# Patient Record
Sex: FEMALE | Race: WHITE | NOT HISPANIC OR LATINO | Employment: FULL TIME | URBAN - METROPOLITAN AREA
[De-identification: names, ages, dates, MRNs, and addresses within clinical notes are randomized per-mention and may not be internally consistent; named-entity substitution may affect disease eponyms.]

---

## 2017-01-26 ENCOUNTER — PATIENT MESSAGE (OUTPATIENT)
Dept: FAMILY MEDICINE | Facility: CLINIC | Age: 67
End: 2017-01-26

## 2017-02-23 ENCOUNTER — OFFICE VISIT (OUTPATIENT)
Dept: FAMILY MEDICINE | Facility: CLINIC | Age: 67
End: 2017-02-23
Payer: MEDICARE

## 2017-02-23 VITALS
DIASTOLIC BLOOD PRESSURE: 70 MMHG | TEMPERATURE: 98 F | WEIGHT: 197.75 LBS | BODY MASS INDEX: 31.78 KG/M2 | HEART RATE: 68 BPM | HEIGHT: 66 IN | SYSTOLIC BLOOD PRESSURE: 104 MMHG

## 2017-02-23 DIAGNOSIS — Z13.9 SCREENING: ICD-10-CM

## 2017-02-23 DIAGNOSIS — R73.09 OTHER ABNORMAL GLUCOSE: ICD-10-CM

## 2017-02-23 DIAGNOSIS — R73.03 PREDIABETES: ICD-10-CM

## 2017-02-23 DIAGNOSIS — Z80.0 FAMILY HISTORY OF COLON CANCER REQUIRING SCREENING COLONOSCOPY: Primary | ICD-10-CM

## 2017-02-23 DIAGNOSIS — R79.9 ABNORMAL FINDING OF BLOOD CHEMISTRY: ICD-10-CM

## 2017-02-23 DIAGNOSIS — Z78.0 MENOPAUSE: ICD-10-CM

## 2017-02-23 DIAGNOSIS — Z84.89 FAMILY HEALTH PROBLEM: ICD-10-CM

## 2017-02-23 PROCEDURE — 99397 PER PM REEVAL EST PAT 65+ YR: CPT | Mod: S$GLB,,, | Performed by: FAMILY MEDICINE

## 2017-02-23 PROCEDURE — 99999 PR PBB SHADOW E&M-EST. PATIENT-LVL III: CPT | Mod: PBBFAC,,, | Performed by: FAMILY MEDICINE

## 2017-02-23 NOTE — MR AVS SNAPSHOT
"    [facility]  [address], [address]  [facility] [address]  Phone: [phone]  Fax: [phone]                  Sarita Lopez   2017 2:00 PM   Office Visit    Description:  Female : 1950   Provider:  Laila Singleton MD   Department:  [facility]           Reason for Visit     Other           Diagnoses this Visit        Comments    Family history of colon cancer requiring screening colonoscopy    -  Primary     Menopause         Screening         Family health problem         Other abnormal glucose         Prediabetes         Abnormal finding of blood chemistry                To Do List           Goals (5 Years of Data)     None      [facility]     [facility] -  Assistance  Registered nurses in the [facility] provide clinical advisement, health education, appointment booking, and other advisory services.  Call for this free service at [phone].             Medications           Message regarding Medications     Verify the changes and/or additions to your medication regime listed below are the same as discussed with your clinician today.  If any of these changes or additions are incorrect, please notify your healthcare provider.             Verify that the below list of medications is an accurate representation of the medications you are currently taking.  If none reported, the list may be blank. If incorrect, please contact your healthcare provider. Carry this list with you in case of emergency.                Clinical Reference Information           Your Vitals Were     BP Pulse Temp Height Weight BMI    104/70 (BP Location: Left arm) 68 98.3 °F (36.8 °C) 5' 6" (1.676 m) 89.7 kg (197 lb 12 oz) 31.92 kg/m2      Blood Pressure          Most Recent Value    BP  104/70      Allergies as of 2017     Benadryl [Diphenhydramine Hcl]    Tetracyclines      Immunizations Administered on Date of " Encounter - 2/23/2017     None      Orders Placed During Today's Visit      Normal Orders This Visit    Case request GI: COLONOSCOPY     Future Labs/Procedures Expected by Expires    CBC auto differential  2/23/2017 4/24/2018    Comprehensive metabolic panel  2/23/2017 4/24/2018    DXA Bone Density Spine And Hip  2/23/2017 2/23/2018    Hemoglobin A1c  2/23/2017 4/24/2018    Hepatitis C antibody  2/23/2017 4/24/2018    Lipid panel  2/23/2017 4/24/2018    TSH  2/23/2017 4/24/2018    US Abdominal Aorta  2/23/2017 2/23/2018      Language Assistance Services     ATTENTION: Language assistance services are available, free of charge. Please call [phone].      ATENCIÓN: Si habla bri, tiene a more disposición servicios gratuitos de asistencia lingüística. Llame al [phone].     CHÚ Ý: N?u b?n nói Ti?ng Vi?t, có các d?ch v? h? tr? ngôn ng? mi?n phí dành cho b?n. G?i s? [phone].         [facility] complies with applicable Federal civil rights laws and does not discriminate on the basis of race, color, national origin, age, disability, or sex.

## 2017-02-24 NOTE — PROGRESS NOTES
Sarita Lopez is a 67 y.o. female   Routine physical  Source of history: Patient  Past Medical History   Diagnosis Date    Endometriosis of pelvis     Osteopenia      Patient  reports that she has never smoked. She has never used smokeless tobacco. She reports that she does not drink alcohol or use illicit drugs.  Family History   Problem Relation Age of Onset    Cancer Father     Colon cancer Father 58    Diabetes Father     Stroke Mother     Dementia Mother     Breast cancer Neg Hx     Ovarian cancer Neg Hx      ROS:  GENERAL: No fever, chills, fatigability or weight loss.  SKIN: No rashes, itching or changes in color or texture of skin.  HEAD: No headaches or recent head trauma.  EYES: Visual acuity fine. No photophobia, ocular pain or diplopia.  EARS: Denies ear pain, discharge or vertigo.  NOSE: No loss of smell, no epistaxis or postnasal drip.  MOUTH & THROAT: No hoarseness or change in voice. No excessive gum bleeding.  NODES: Denies swollen glands.  CHEST: Denies DAWSON, cyanosis, wheezing, cough and sputum production.  CARDIOVASCULAR: Denies chest pain, PND, orthopnea or reduced exercise tolerance.  ABDOMEN: Appetite fine. No weight loss. Denies diarrhea, abdominal pain, hematemesis or blood in stool.  URINARY: No flank pain, dysuria or hematuria.  PERIPHERAL VASCULAR: No claudication or cyanosis.  MUSCULOSKELETAL: No joint stiffness or swelling. Denies back pain.  NEUROLOGIC: No history of seizures, paralysis, alteration of gait or coordination.    OBJECTIVE:  APPEARANCE: Overweight pleasant no acute distress  Vitals:    02/23/17 1334   BP: 104/70   Pulse: 68   Temp: 98.3 °F (36.8 °C)     SKIN: Normal skin turgor, no lesions.  HEENT: Both external auditory canals clear. Both tympanic membranes intact. PERRL. EOMI.   Disk margins sharp. No tonsillar enlargement. No pharyngeal erythema or exudate. No stridor.  NECK: No bruits. No cervical spine tenderness. No cervical lymphadenopathy. No  thyromegaly.  NODES: No cervical, axillary or inguinal lymph node enlargement.  CHEST: Breath sounds clear bilaterally. Lungs clear to auscultation & percussion. Good air movement. No rales. No retractions. No rhonchi. No stridor. No wheezes.  CARDIOVASCULAR: Normal S1, S2. No murmurs. No edema.  BREASTS: no masses palpated in either breast or axillary area, symmetry noted.  ABDOMEN: Bowel sounds normal. No palpable aortic enlargement. No CVA tenderness. No pulsatile mass. No rebound tenderness.  PERIPHERAL VASCULAR: Femoral pulses present and symmetrical. No edema.  MUSCULOSKELETAL: Degenerative changes of both ankles, foot, knee, wrist and hand.  BACK: No CVA tenderness. There is no spasm, tenderness or radiculopathy noted with palpation and there is full range of motion.   NEUROLOGIC:   Cranial Nerves: II-XII grossly intact.  Motor: 5/5 strength major flexors/extensors. No tremor.  DTR's: Knees, Ankles 2+ and equal bilaterally; downgoing toes.  Sensory: Intact to light touch distally.  Gait & Posture: Normal gait and fine motion. No cerebellar signs.  MENTAL STATUS: Alert. Oriented x 3. Language skills normal. Memory intact. No suicidal ideation. Well kept appearance.    ASSESSMENT/PLAN:   Sarita was seen today for other.    Diagnoses and all orders for this visit:    Family history of colon cancer requiring screening colonoscopy  -     Case request GI: COLONOSCOPY    Menopause  -     DXA Bone Density Spine And Hip; Future    Screening  -     US Abdominal Aorta; Future  -     Hemoglobin A1c; Future  -     Hepatitis C antibody; Future    Family health problem  -     CBC auto differential; Future  -     TSH; Future  -     Lipid panel; Future  -     Comprehensive metabolic panel; Future    Other abnormal glucose   -     CBC auto differential; Future  -     Hemoglobin A1c; Future    Prediabetes   -     TSH; Future    Abnormal finding of blood chemistry   -     Lipid panel; Future     GYN exam and mammogram  Patient  scheduled with outside gynecologist is already had her mammogram done  In May of last year at [facility] will have results sent to our office    Patient declines immunizations of any kind at this time  Will contact pt when results are available to discuss options

## 2017-03-17 ENCOUNTER — TELEPHONE (OUTPATIENT)
Dept: FAMILY MEDICINE | Facility: CLINIC | Age: 67
End: 2017-03-17

## 2017-03-17 NOTE — TELEPHONE ENCOUNTER
----- Message from Amelia Fu sent at 3/17/2017 12:36 PM CDT -----  Pt wanted to know if she should have an EKG done. She didn't know if that was part of her routine check up.    Thanks!

## 2017-03-17 NOTE — TELEPHONE ENCOUNTER
Spoke with patient would like to know if an EKG is included with her routine preventative benefits for medicare.  Advised patient that she would need to contact medicare for a list of covered preventatives unless she's experiencing cardiac symptoms ( which she states she's not).  Patient states she will contact medicare concerning the list of covered preventatives.

## 2017-03-22 ENCOUNTER — LAB VISIT (OUTPATIENT)
Dept: LAB | Facility: HOSPITAL | Age: 67
End: 2017-03-22
Attending: FAMILY MEDICINE
Payer: MEDICARE

## 2017-03-22 DIAGNOSIS — R79.9 ABNORMAL FINDING OF BLOOD CHEMISTRY: ICD-10-CM

## 2017-03-22 DIAGNOSIS — R73.03 PREDIABETES: ICD-10-CM

## 2017-03-22 DIAGNOSIS — Z84.89 FAMILY HEALTH PROBLEM: ICD-10-CM

## 2017-03-22 DIAGNOSIS — R73.09 OTHER ABNORMAL GLUCOSE: ICD-10-CM

## 2017-03-22 DIAGNOSIS — Z13.9 SCREENING: ICD-10-CM

## 2017-03-22 LAB
ALBUMIN SERPL BCP-MCNC: 3.6 G/DL
ALP SERPL-CCNC: 84 U/L
ALT SERPL W/O P-5'-P-CCNC: 25 U/L
ANION GAP SERPL CALC-SCNC: 11 MMOL/L
AST SERPL-CCNC: 25 U/L
BASOPHILS # BLD AUTO: 0.03 K/UL
BASOPHILS NFR BLD: 0.6 %
BILIRUB SERPL-MCNC: 0.5 MG/DL
BUN SERPL-MCNC: 16 MG/DL
CALCIUM SERPL-MCNC: 9.4 MG/DL
CHLORIDE SERPL-SCNC: 107 MMOL/L
CHOLEST/HDLC SERPL: 1.9 {RATIO}
CO2 SERPL-SCNC: 24 MMOL/L
CREAT SERPL-MCNC: 0.9 MG/DL
DIFFERENTIAL METHOD: NORMAL
EOSINOPHIL # BLD AUTO: 0.2 K/UL
EOSINOPHIL NFR BLD: 3.9 %
ERYTHROCYTE [DISTWIDTH] IN BLOOD BY AUTOMATED COUNT: 13.3 %
EST. GFR  (AFRICAN AMERICAN): >60 ML/MIN/1.73 M^2
EST. GFR  (NON AFRICAN AMERICAN): >60 ML/MIN/1.73 M^2
GLUCOSE SERPL-MCNC: 90 MG/DL
HCT VFR BLD AUTO: 46 %
HCV AB SERPL QL IA: NEGATIVE
HDL/CHOLESTEROL RATIO: 54.1 %
HDLC SERPL-MCNC: 111 MG/DL
HDLC SERPL-MCNC: 60 MG/DL
HGB BLD-MCNC: 14.8 G/DL
LDLC SERPL CALC-MCNC: 47 MG/DL
LYMPHOCYTES # BLD AUTO: 2 K/UL
LYMPHOCYTES NFR BLD: 41.8 %
MCH RBC QN AUTO: 30.6 PG
MCHC RBC AUTO-ENTMCNC: 32.2 %
MCV RBC AUTO: 95 FL
MONOCYTES # BLD AUTO: 0.5 K/UL
MONOCYTES NFR BLD: 10.3 %
NEUTROPHILS # BLD AUTO: 2.1 K/UL
NEUTROPHILS NFR BLD: 43.4 %
NONHDLC SERPL-MCNC: 51 MG/DL
PLATELET # BLD AUTO: 228 K/UL
PMV BLD AUTO: 12.3 FL
POTASSIUM SERPL-SCNC: 4.1 MMOL/L
PROT SERPL-MCNC: 7 G/DL
RBC # BLD AUTO: 4.83 M/UL
SODIUM SERPL-SCNC: 142 MMOL/L
TRIGL SERPL-MCNC: 20 MG/DL
TSH SERPL DL<=0.005 MIU/L-ACNC: 1.03 UIU/ML
WBC # BLD AUTO: 4.86 K/UL

## 2017-03-22 PROCEDURE — 84443 ASSAY THYROID STIM HORMONE: CPT

## 2017-03-22 PROCEDURE — 36415 COLL VENOUS BLD VENIPUNCTURE: CPT | Mod: PO

## 2017-03-22 PROCEDURE — 80061 LIPID PANEL: CPT

## 2017-03-22 PROCEDURE — 83036 HEMOGLOBIN GLYCOSYLATED A1C: CPT

## 2017-03-22 PROCEDURE — 85025 COMPLETE CBC W/AUTO DIFF WBC: CPT

## 2017-03-22 PROCEDURE — 86803 HEPATITIS C AB TEST: CPT

## 2017-03-22 PROCEDURE — 80053 COMPREHEN METABOLIC PANEL: CPT

## 2017-03-23 ENCOUNTER — HOSPITAL ENCOUNTER (OUTPATIENT)
Dept: RADIOLOGY | Facility: OTHER | Age: 67
Discharge: HOME | End: 2017-03-23
Attending: FAMILY MEDICINE
Payer: MEDICARE

## 2017-03-23 ENCOUNTER — PATIENT MESSAGE (OUTPATIENT)
Dept: FAMILY MEDICINE | Facility: CLINIC | Age: 67
End: 2017-03-23

## 2017-03-23 DIAGNOSIS — Z13.9 SCREENING: ICD-10-CM

## 2017-03-23 DIAGNOSIS — Z78.0 MENOPAUSE: ICD-10-CM

## 2017-03-23 LAB
ESTIMATED AVG GLUCOSE: 120 MG/DL
HBA1C MFR BLD HPLC: 5.8 %

## 2017-03-23 PROCEDURE — 77080 DXA BONE DENSITY AXIAL: CPT | Mod: TC

## 2017-03-23 PROCEDURE — 77080 DXA BONE DENSITY AXIAL: CPT | Mod: 26,,, | Performed by: RADIOLOGY

## 2017-03-23 PROCEDURE — 76775 US EXAM ABDO BACK WALL LIM: CPT | Mod: 26,,, | Performed by: RADIOLOGY

## 2017-03-23 PROCEDURE — 76775 US EXAM ABDO BACK WALL LIM: CPT | Mod: TC

## 2017-03-27 ENCOUNTER — PATIENT MESSAGE (OUTPATIENT)
Dept: FAMILY MEDICINE | Facility: CLINIC | Age: 67
End: 2017-03-27

## 2017-03-31 ENCOUNTER — TELEPHONE (OUTPATIENT)
Dept: FAMILY MEDICINE | Facility: CLINIC | Age: 67
End: 2017-03-31

## 2017-04-03 ENCOUNTER — CLINICAL SUPPORT (OUTPATIENT)
Dept: FAMILY MEDICINE | Facility: CLINIC | Age: 67
End: 2017-04-03
Payer: MEDICARE

## 2017-04-03 VITALS — DIASTOLIC BLOOD PRESSURE: 76 MMHG | SYSTOLIC BLOOD PRESSURE: 138 MMHG

## 2017-04-03 PROCEDURE — 99999 PR PBB SHADOW E&M-EST. PATIENT-LVL I: CPT | Mod: PBBFAC,,,

## 2017-04-03 NOTE — PROGRESS NOTES
Patient here today for b/p check b/p 138/76,MD notified of B/p reading.   Patient also had a few concerns states a few days ago her left ring finger had some redness with slight discomfort, and her left inner thigh was pulsating.  Patient states the symptoms haven't returned but wanted to know if she should see vascular med.  Please advise.

## 2017-04-04 ENCOUNTER — OFFICE VISIT (OUTPATIENT)
Dept: OPTOMETRY | Facility: CLINIC | Age: 67
End: 2017-04-04
Payer: MEDICARE

## 2017-04-04 DIAGNOSIS — H52.4 ASTIGMATISM WITH PRESBYOPIA, BILATERAL: ICD-10-CM

## 2017-04-04 DIAGNOSIS — H52.203 ASTIGMATISM WITH PRESBYOPIA, BILATERAL: ICD-10-CM

## 2017-04-04 DIAGNOSIS — H25.13 NUCLEAR SCLEROSIS, BILATERAL: Primary | ICD-10-CM

## 2017-04-04 PROCEDURE — 92004 COMPRE OPH EXAM NEW PT 1/>: CPT | Mod: S$GLB,,, | Performed by: OPTOMETRIST

## 2017-04-04 PROCEDURE — 99999 PR PBB SHADOW E&M-EST. PATIENT-LVL II: CPT | Mod: PBBFAC,,, | Performed by: OPTOMETRIST

## 2017-04-04 NOTE — MR AVS SNAPSHOT
[place] [facility]   [address]  [place] [address]  Phone: [phone]  Fax: [phone]                  Charu Lopez   2017 3:00 PM   Office Visit    Description:  Female : 1950   Provider:  Eddy Lucero OD   Department:  [place] [facility]           Reason for Visit     Concerns About Ocular Health           Diagnoses this Visit        Comments    Nuclear sclerosis, bilateral    -  Primary     Astigmatism with presbyopia, bilateral                To Do List           Goals (5 Years of Data)     None      Follow-Up and Disposition     Return in about 1 year (around 2018) for routine eye exam.    Follow-up and Disposition History      [facility]     [facility] -  Assistance  Unless otherwise directed by your provider, please contact [facility], our nurse care line that is available for  assistance.     Registered nurses in the [facility] provide: appointment scheduling, clinical advisement, health education, and other advisory services.  Call: [phone] (toll free)               Medications           Message regarding Medications     Verify the changes and/or additions to your medication regime listed below are the same as discussed with your clinician today.  If any of these changes or additions are incorrect, please notify your healthcare provider.             Verify that the below list of medications is an accurate representation of the medications you are currently taking.  If none reported, the list may be blank. If incorrect, please contact your healthcare provider. Carry this list with you in case of emergency.                Clinical Reference Information           Allergies as of 2017     Benadryl [Diphenhydramine Hcl]    Tetracyclines      Immunizations Administered on Date of Encounter - 2017     None      Instructions    Diagnosis and Plan reviewed with patient.        Language Assistance Services     ATTENTION: Language assistance services are available, free of charge. Please call [phone].      ATENCIÓN: Si habla bri, tiene a more disposición servicios gratuitos de asistencia lingüística. Llame al [phone].     CHÚ Ý: N?u b?n nói Ti?ng Vi?t, có các d?ch v? h? tr? ngôn ng? mi?n phí dành cho b?n. G?i s? [phone].         [facility] complies with applicable Federal civil rights laws and does not discriminate on the basis of race, color, national origin, age, disability, or sex.

## 2017-04-04 NOTE — PROGRESS NOTES
HPI     Last exam 12/2015. Hasn't noticed any vision change.Recently had blood   work done was told she was pre-diabetic A1C was 5.8.  Wears separate pairs   of glasses for distance and near. Drugstore reading RX is +1.50.       Last edited by Jill Daly on 4/4/2017  3:34 PM.     ROS     Negative for: Constitutional, Gastrointestinal, Neurological, Skin,   Genitourinary, Musculoskeletal, HENT, Endocrine, Cardiovascular, Eyes,   Respiratory, Psychiatric, Allergic/Imm, Heme/Lymph    Last edited by Eddy Lucero, OD on 4/4/2017  4:11 PM. (History)        Assessment /Plan     For exam results, see Encounter Report.    Nuclear sclerosis, bilateral    Astigmatism with presbyopia, bilateral      1. Educated pt on presence of cataracts and effects on vision. No surgery at this time. Recheck in one year.  2. Spec Rx given. Different lens options discussed with patient. RTC 1 year full exam.

## 2017-04-19 ENCOUNTER — OFFICE VISIT (OUTPATIENT)
Dept: FAMILY MEDICINE | Facility: CLINIC | Age: 67
End: 2017-04-19
Payer: MEDICARE

## 2017-04-19 VITALS
TEMPERATURE: 98 F | SYSTOLIC BLOOD PRESSURE: 120 MMHG | HEART RATE: 68 BPM | WEIGHT: 186.94 LBS | HEIGHT: 66 IN | BODY MASS INDEX: 30.04 KG/M2 | DIASTOLIC BLOOD PRESSURE: 80 MMHG

## 2017-04-19 DIAGNOSIS — R09.89 ABNORMAL FEMORAL PULSE: ICD-10-CM

## 2017-04-19 DIAGNOSIS — R07.9 CHEST PAIN, UNSPECIFIED TYPE: ICD-10-CM

## 2017-04-19 DIAGNOSIS — M62.838 MUSCLE SPASM: ICD-10-CM

## 2017-04-19 DIAGNOSIS — I72.4 FEMORAL ARTERY ANEURYSM: Primary | ICD-10-CM

## 2017-04-19 PROCEDURE — 99214 OFFICE O/P EST MOD 30 MIN: CPT | Mod: S$GLB,,, | Performed by: FAMILY MEDICINE

## 2017-04-19 PROCEDURE — 93005 ELECTROCARDIOGRAM TRACING: CPT | Mod: S$GLB,,, | Performed by: FAMILY MEDICINE

## 2017-04-19 PROCEDURE — 99999 PR PBB SHADOW E&M-EST. PATIENT-LVL III: CPT | Mod: PBBFAC,,, | Performed by: FAMILY MEDICINE

## 2017-04-19 PROCEDURE — 1160F RVW MEDS BY RX/DR IN RCRD: CPT | Mod: S$GLB,,, | Performed by: FAMILY MEDICINE

## 2017-04-19 PROCEDURE — 93010 ELECTROCARDIOGRAM REPORT: CPT | Mod: S$GLB,,, | Performed by: INTERNAL MEDICINE

## 2017-04-19 RX ORDER — MULTIVITAMIN
1 TABLET ORAL DAILY
COMMUNITY

## 2017-04-20 ENCOUNTER — LAB VISIT (OUTPATIENT)
Dept: LAB | Facility: HOSPITAL | Age: 67
End: 2017-04-20
Attending: FAMILY MEDICINE
Payer: MEDICARE

## 2017-04-20 DIAGNOSIS — M62.838 MUSCLE SPASM: ICD-10-CM

## 2017-04-20 LAB
MAGNESIUM SERPL-MCNC: 2.3 MG/DL
PHOSPHATE SERPL-MCNC: 2.7 MG/DL

## 2017-04-20 PROCEDURE — 82085 ASSAY OF ALDOLASE: CPT

## 2017-04-20 PROCEDURE — 36415 COLL VENOUS BLD VENIPUNCTURE: CPT | Mod: PO

## 2017-04-20 PROCEDURE — 83735 ASSAY OF MAGNESIUM: CPT

## 2017-04-20 PROCEDURE — 82550 ASSAY OF CK (CPK): CPT

## 2017-04-20 PROCEDURE — 84100 ASSAY OF PHOSPHORUS: CPT

## 2017-04-20 NOTE — PROGRESS NOTES
Subjective:       Patient ID: Charu Lopez is a 67 y.o. female.    Chief Complaint: Otalgia  Chest Pain (LEFT SIDE); she'll experience some pulsating of the left medial thigh area that was visible.  ; ELEVATED BLOOD PRESSURE patient is also had some scattered muscle twitches throughout that lasts for less than a minute    HPI see above  Review of Systems   Constitutional: Negative.    HENT: Negative.    Eyes: Negative.    Cardiovascular: Positive for chest pain.   Gastrointestinal: Negative.    Endocrine: Negative.    Genitourinary: Negative.    Musculoskeletal:        Scattered muscle twitches   Skin: Negative.    Allergic/Immunologic: Negative.    Neurological: Positive for numbness.   Hematological: Negative.    Psychiatric/Behavioral: Negative.        Objective:      Physical Exam   Constitutional: She is oriented to person, place, and time. She appears well-developed and well-nourished. No distress.   HENT:   Head: Normocephalic and atraumatic.   Right Ear: External ear normal.   Left Ear: External ear normal.   Nose: Nose normal.   Mouth/Throat: Oropharynx is clear and moist.   Eyes: Conjunctivae and EOM are normal. Pupils are equal, round, and reactive to light. Right eye exhibits no discharge. Left eye exhibits no discharge.   Neck: Normal range of motion. Neck supple. No JVD present. No thyromegaly present.   Cardiovascular: Normal rate, regular rhythm, normal heart sounds and intact distal pulses.    Pulmonary/Chest: Effort normal and breath sounds normal. No respiratory distress.   Abdominal: Soft. Bowel sounds are normal. She exhibits no distension. There is no tenderness.   Musculoskeletal: Normal range of motion.   Neurological: She is alert and oriented to person, place, and time. She displays normal reflexes. No cranial nerve deficit. Coordination normal.   Skin: She is not diaphoretic.        Colored area represents area where there was  pulsation reported that was actually visible.    Nursing note and vitals reviewed.      Assessment:       1. Femoral artery aneurysm    2. Chest pain, unspecified type    3. Muscle spasm        Plan:           IN OFFICE EKG 12-LEAD (to Muse) normal sinus rhythym     Future Labs/Procedures Expected by Expires     Aldolase  4/19/2017 6/18/2018     CK isoenzymes  4/19/2017 6/18/2018     Magnesium  4/19/2017 6/18/2018     PHOSPHORUS  4/19/2017 6/18/2018     Cardiology Lab US Lower Extremity Arteries Bilateral  As directed 4/19/2018     Exercise stress echo  As directed 4/19/2018     Intact the patient results of testing are available.

## 2017-04-21 LAB — ALDOLASE SERPL-CCNC: 3.7 U/L

## 2017-04-25 LAB
CK MB CFR SERPL ELPH: 0 % (ref 0–3.3)
CK SERPL-CCNC: 46 U/L (ref 30–223)
CK-BB: 0 %
CK-MM: 100 % (ref 96.7–100)

## 2017-04-27 ENCOUNTER — CLINICAL SUPPORT (OUTPATIENT)
Dept: CARDIOLOGY | Facility: CLINIC | Age: 67
End: 2017-04-27
Payer: MEDICARE

## 2017-04-27 DIAGNOSIS — I72.4 FEMORAL ARTERY ANEURYSM: ICD-10-CM

## 2017-04-27 DIAGNOSIS — R07.9 CHEST PAIN, UNSPECIFIED TYPE: ICD-10-CM

## 2017-04-27 LAB
DIASTOLIC DYSFUNCTION: NO
MITRAL VALVE MOBILITY: NORMAL
RETIRED EF AND QEF - SEE NOTES: 60 (ref 55–65)

## 2017-04-27 PROCEDURE — 93351 STRESS TTE COMPLETE: CPT | Mod: S$GLB,,, | Performed by: INTERNAL MEDICINE

## 2017-04-27 PROCEDURE — 93321 DOPPLER ECHO F-UP/LMTD STD: CPT | Mod: S$GLB,,, | Performed by: INTERNAL MEDICINE

## 2017-04-27 PROCEDURE — 93925 LOWER EXTREMITY STUDY: CPT | Mod: S$GLB,,, | Performed by: INTERNAL MEDICINE

## 2017-08-01 ENCOUNTER — TELEPHONE (OUTPATIENT)
Dept: FAMILY MEDICINE | Facility: CLINIC | Age: 67
End: 2017-08-01

## 2017-08-01 DIAGNOSIS — Z12.31 OTHER SCREENING MAMMOGRAM: Primary | ICD-10-CM

## 2017-08-01 NOTE — TELEPHONE ENCOUNTER
----- Message from Indira Agatha sent at 8/1/2017  4:28 PM CDT -----  Contact: pt [phone]  Pt said she need the Dr to fax over her orders for her mammogram to [phone] her appointment is on 9-6-2017,please advise

## 2017-08-02 NOTE — TELEPHONE ENCOUNTER
patient advised per voicemail the requested order was fax to [facility] to the number provided below.

## 2017-09-01 ENCOUNTER — TELEPHONE (OUTPATIENT)
Dept: FAMILY MEDICINE | Facility: CLINIC | Age: 67
End: 2017-09-01

## 2017-09-01 NOTE — TELEPHONE ENCOUNTER
----- Message from Fay Collins sent at 9/1/2017  8:33 AM CDT -----  Contact: Patient [phone]  Orders for her mammogram need to be faxed to [facility] at [phone]. Earlier request had the wrong fax number. Please resend order - her mammo is scheduled for 09/06/2017.    Please advise.    Thank You

## 2018-01-05 ENCOUNTER — HOSPITAL ENCOUNTER (OUTPATIENT)
Dept: RADIOLOGY | Facility: HOSPITAL | Age: 68
Discharge: HOME | End: 2018-01-05
Attending: NURSE PRACTITIONER
Payer: MEDICARE

## 2018-01-05 ENCOUNTER — OFFICE VISIT (OUTPATIENT)
Dept: FAMILY MEDICINE | Facility: CLINIC | Age: 68
End: 2018-01-05
Payer: MEDICARE

## 2018-01-05 VITALS
HEIGHT: 66 IN | WEIGHT: 191.13 LBS | HEART RATE: 80 BPM | TEMPERATURE: 98 F | SYSTOLIC BLOOD PRESSURE: 117 MMHG | DIASTOLIC BLOOD PRESSURE: 71 MMHG | BODY MASS INDEX: 30.72 KG/M2
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
## 2018-01-05 DIAGNOSIS — T14.8XXA ABRASION: ICD-10-CM

## 2018-01-05 DIAGNOSIS — M25.512 ACUTE PAIN OF LEFT SHOULDER: ICD-10-CM

## 2018-01-05 DIAGNOSIS — S80.02XA CONTUSION OF LEFT KNEE, INITIAL ENCOUNTER: ICD-10-CM

## 2018-01-05 DIAGNOSIS — M79.642 LEFT HAND PAIN: ICD-10-CM
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
## 2018-01-05 PROCEDURE — 73130 X-RAY EXAM OF HAND: CPT | Mod: TC,FY,PO,LT

## 2018-01-05 PROCEDURE — 73110 X-RAY EXAM OF WRIST: CPT | Mod: 26,LT,, | Performed by: RADIOLOGY

## 2018-01-05 PROCEDURE — 99999 PR PBB SHADOW E&M-EST. PATIENT-LVL V: CPT | Mod: PBBFAC,,, | Performed by: NURSE PRACTITIONER

## 2018-01-05 PROCEDURE — 73030 X-RAY EXAM OF SHOULDER: CPT | Mod: 26,LT,, | Performed by: RADIOLOGY

## 2018-01-05 PROCEDURE — 73562 X-RAY EXAM OF KNEE 3: CPT | Mod: TC,FY,PO,LT

## 2018-01-05 PROCEDURE — 99214 OFFICE O/P EST MOD 30 MIN: CPT | Mod: 25,S$GLB,, | Performed by: NURSE PRACTITIONER

## 2018-01-05 PROCEDURE — 73110 X-RAY EXAM OF WRIST: CPT | Mod: TC,FY,PO,LT

## 2018-01-05 PROCEDURE — 73562 X-RAY EXAM OF KNEE 3: CPT | Mod: 26,LT,, | Performed by: RADIOLOGY

## 2018-01-05 PROCEDURE — 90471 IMMUNIZATION ADMIN: CPT | Mod: S$GLB,,, | Performed by: FAMILY MEDICINE

## 2018-01-05 PROCEDURE — 90714 TD VACC NO PRESV 7 YRS+ IM: CPT | Mod: S$GLB,,, | Performed by: FAMILY MEDICINE

## 2018-01-05 PROCEDURE — 73030 X-RAY EXAM OF SHOULDER: CPT | Mod: TC,FY,PO,LT

## 2018-01-05 PROCEDURE — 73130 X-RAY EXAM OF HAND: CPT | Mod: 26,LT,, | Performed by: RADIOLOGY

## 2018-01-05 NOTE — PROGRESS NOTES
"Subjective:       Patient ID: Charu Lopez is a 67 y.o. female.    Chief Complaint: Laceration (above eye/fall 1/4); Knee Injury (fall 1/4 bruise); and Hand Injury (left/fall 1/4)    Pt here for multiple contusions.  Tripped over  in parking lot yesterday.  Fell and hit knees.  Had sunglasses on and they hit left side of face making a small abrasion to left eyebrow area.  Has pain and bruising to left hand, left knee and left shoulder pain.  Some neck pain        Laceration      Knee Injury   Associated symptoms include arthralgias. Pertinent negatives include no abdominal pain, headaches, nausea or vomiting.   Hand Injury        Past Medical History:   Diagnosis Date    Endometrioma of ovary     Osteopenia      Past Surgical History:   Procedure Laterality Date    BREAST BIOPSY  91    wnl     ENDOMETRIAL BIOPSY  12/12/2012    laparatomy 1994      OOPHORECTOMY  1995    Left ov removal due to endometrioma     Social History     Social History Narrative    No narrative on file     Family History   Problem Relation Age of Onset    Cancer Father     Colon cancer Father 58    Diabetes Father     Hypertension Father     Stroke Mother     Dementia Mother     Breast cancer Neg Hx     Ovarian cancer Neg Hx     Amblyopia Neg Hx     Strabismus Neg Hx     Thyroid disease Neg Hx     Retinal detachment Neg Hx     Macular degeneration Neg Hx     Glaucoma Neg Hx     Cataracts Neg Hx     Blindness Neg Hx      Vitals:    01/05/18 1327   BP: 117/71   Pulse: 80   Temp: 98.2 °F (36.8 °C)   TempSrc: Oral   Weight: 86.7 kg (191 lb 2.2 oz)   Height: 5' 6" (1.676 m)   PainSc:   2   PainLoc: Knee     Outpatient Encounter Prescriptions as of 1/5/2018   Medication Sig Dispense Refill    multivitamin (ONE DAILY MULTIVITAMIN) per tablet Take 1 tablet by mouth once daily.       No facility-administered encounter medications on file as of 1/5/2018.      Wt Readings from Last 3 Encounters:   01/05/18 " "86.7 kg (191 lb 2.2 oz)   04/19/17 84.8 kg (186 lb 15.2 oz)   02/23/17 89.7 kg (197 lb 12 oz)     Last 3 sets of Vitals    Vitals - 1 value per visit 4/4/2017 4/19/2017 1/5/2018   SYSTOLIC - 120 117   DIASTOLIC - 80 71   PULSE - 68 80   TEMPERATURE - 98 98.2   RESPIRATIONS - - -   SPO2 - - -   Weight (lb) - 186.95 191.14   Weight (kg) - 84.8 86.7   HEIGHT - 5' 6" 5' 6"   BODY MASS INDEX - 30.17 30.85   VISIT REPORT - - -   Pain Score  0 4 2   Some recent data might be hidden     No results found for: CBC  Review of Systems   HENT: Negative for tinnitus and trouble swallowing.    Gastrointestinal: Negative for abdominal pain, constipation, diarrhea, nausea and vomiting.   Musculoskeletal: Positive for arthralgias.   Skin: Positive for wound.   Neurological: Negative for dizziness, facial asymmetry and headaches.       Objective:      Physical Exam   Constitutional: She is oriented to person, place, and time. She appears well-developed and well-nourished. No distress.   HENT:   Head: Normocephalic and atraumatic.   Eyes: Conjunctivae and EOM are normal. Pupils are equal, round, and reactive to light.   Neck: Normal range of motion and full passive range of motion without pain. Neck supple. Spinous process tenderness present. No muscular tenderness present. No neck rigidity. No edema and no erythema present.       TTP over lower c spine midline     Cardiovascular: Normal rate, regular rhythm, normal heart sounds and intact distal pulses.    Pulmonary/Chest: Effort normal and breath sounds normal. No respiratory distress.   Abdominal: Soft.   Musculoskeletal:        Left shoulder: She exhibits tenderness and bony tenderness. She exhibits normal range of motion, no swelling, no effusion, no crepitus, no deformity, no laceration, no pain, no spasm, normal pulse and normal strength.        Left wrist: Normal.        Left knee: She exhibits swelling, effusion, ecchymosis and bony tenderness. She exhibits normal range of " motion, no deformity, no laceration, no erythema, normal alignment, no LCL laxity, normal patellar mobility, normal meniscus and no MCL laxity. Tenderness found. Medial joint line tenderness noted. No lateral joint line, no MCL, no LCL and no patellar tendon tenderness noted.        Left ankle: Normal.        Arms:       Left hand: She exhibits tenderness, bony tenderness and swelling. She exhibits normal range of motion, normal two-point discrimination, normal capillary refill, no deformity and no laceration. Normal sensation noted. Decreased strength noted.        Hands:       Legs:  Neurological: She is alert and oriented to person, place, and time. No cranial nerve deficit.   Skin: Skin is warm and dry. Capillary refill takes less than 2 seconds. She is not diaphoretic.   Small abrasion to area over left eye, no active bleeding  Some surrounding ecchymosis noted     Psychiatric: She has a normal mood and affect. Her behavior is normal. Judgment and thought content normal.   Nursing note and vitals reviewed.      Assessment:       1. Fall, initial encounter    2. Abrasion    3. Acute pain of left shoulder    4. Left hand pain    5. Contusion of left knee, initial encounter        Plan:       Pt declined cervical spine films  Neuro exam negative  Charu was seen today for laceration, knee injury and hand injury.    Diagnoses and all orders for this visit:    Fall, initial encounter  -     (In Office Administered) Td Vaccine - Preservative Free  -     X-Ray Wrist Complete Left; Future  -     X-Ray Hand 3 view Left; Future  -     X-Ray Shoulder Trauma 3 view Left; Future  -     X-Ray Knee 3 View Left; Future    Abrasion  -     (In Office Administered) Td Vaccine - Preservative Free    Acute pain of left shoulder  -     X-Ray Shoulder Trauma 3 view Left; Future    Left hand pain  -     X-Ray Wrist Complete Left; Future  -     X-Ray Hand 3 view Left; Future    Contusion of left knee, initial encounter  -     X-Ray  Knee 3 View Left; Future      Patient Instructions   Ice to sore areas 20 minutes at a time 4 times a day    I will send you a message with your xray results    Tetanus today    Call for any worsening

## 2018-01-05 NOTE — PROGRESS NOTES
Td vaccine given as ordered. Two patient identifiers used, allergies reviewed, & vaccine verified. Administered to left deltoid. Pt tolerated injection well; no swelling, redness, or bruising noted at injection site. Pt advised to remain in clinic 15 mins following injection for observation.

## 2018-01-05 NOTE — PATIENT INSTRUCTIONS
Ice to sore areas 20 minutes at a time 4 times a day    I will send you a message with your xray results    Tetanus today    Call for any worsening

## 2018-01-12 ENCOUNTER — OFFICE VISIT (OUTPATIENT)
Dept: FAMILY MEDICINE | Facility: CLINIC | Age: 68
End: 2018-01-12
Payer: MEDICARE

## 2018-01-12 VITALS
DIASTOLIC BLOOD PRESSURE: 82 MMHG | BODY MASS INDEX: 30.76 KG/M2 | HEART RATE: 96 BPM | WEIGHT: 191.38 LBS | HEIGHT: 66 IN | TEMPERATURE: 98 F | SYSTOLIC BLOOD PRESSURE: 126 MMHG

## 2018-01-12 DIAGNOSIS — L03.90 WOUND CELLULITIS: Primary | ICD-10-CM

## 2018-01-12 PROCEDURE — 99999 PR PBB SHADOW E&M-EST. PATIENT-LVL III: CPT | Mod: PBBFAC,,, | Performed by: FAMILY MEDICINE

## 2018-01-12 PROCEDURE — 99213 OFFICE O/P EST LOW 20 MIN: CPT | Mod: S$GLB,,, | Performed by: FAMILY MEDICINE

## 2018-01-12 RX ORDER — MUPIROCIN CALCIUM 20 MG/G
CREAM TOPICAL 2 TIMES DAILY
Qty: 15 G | Refills: 0 | Status: SHIPPED | OUTPATIENT
Start: 2018-01-12 | End: 2018-08-06

## 2018-01-12 NOTE — PROGRESS NOTES
Subjective:       Patient ID: Charu Lopez is a 68 y.o. female.    Chief Complaint: Abrasion (to knuckles )   patient in for follow-up today was initially seen a week ago after she experienced a fall   and placed this patient feels like the wound is not healing as quickly as it should be.  She feels more stiffness over the knuckles where the abrasions are however there is no increased redness or discharge to the knuckles  HPI the above  Review of Systems   Constitutional: Negative.    HENT: Negative.    Eyes: Negative.    Respiratory: Negative.    Cardiovascular: Negative.    Gastrointestinal: Negative.    Endocrine: Negative.    Genitourinary: Negative.    Musculoskeletal: Positive for arthralgias.   Skin: Positive for wound.   Allergic/Immunologic: Negative.    Neurological: Negative.    Hematological: Negative.    Psychiatric/Behavioral: Negative.        Objective:      Physical Exam   Constitutional: She appears well-developed and well-nourished. No distress.   HENT:   Head: Normocephalic and atraumatic.   Right Ear: External ear normal.   Left Ear: External ear normal.   Nose: Nose normal.   Mouth/Throat: Oropharynx is clear and moist.   Eyes: Conjunctivae and EOM are normal. Pupils are equal, round, and reactive to light.   Neck: No JVD present.   Pulmonary/Chest: Effort normal.   Musculoskeletal:        Right wrist: Normal.        Right forearm: Normal.        Right hand: She exhibits decreased range of motion and tenderness. She exhibits no bony tenderness, normal capillary refill, no deformity, no laceration and no swelling.        Left hand: Normal.   Skin: She is not diaphoretic.        Nursing note and vitals reviewed.      Assessment:       1. Wound cellulitis probably     2.     Healing abrasions over the knuckles  Plan:     med card dated 1/12/2018       mupirocin calcium 2% (BACTROBAN) 2 % cream 2 times daily        IF no better follow-up after completing medications.

## 2018-01-14 ENCOUNTER — NURSE TRIAGE (OUTPATIENT)
Dept: ADMINISTRATIVE | Facility: CLINIC | Age: 68
End: 2018-01-14

## 2018-01-14 NOTE — TELEPHONE ENCOUNTER
"  Reason for Disposition   Cold with no complications (all triage questions negative)    Answer Assessment - Initial Assessment Questions  1. ONSET: "When did the nasal discharge start?"       Two days   2. AMOUNT: "How much discharge is there?"       Small   3. COUGH: "Do you have a cough?" If yes, ask: "Describe the color of your sputum" (clear, white, yellow, green)      Moderate   4. RESPIRATORY DISTRESS: "Describe your breathing."       Otherwise   5. FEVER: "Do you have a fever?" If so, ask: "What is your temperature, how was it measured, and when did it start?"      No   6. SEVERITY: "Overall, how bad are you feeling right now?" (e.g., doesn't interfere with normal activities, staying home from school/work, staying in bed)       Feeling ill and tired   7. OTHER SYMPTOMS: "Do you have any other symptoms?" (e.g., sore throat, earache, wheezing, vomiting)     Friday nauseated once {resolved]  8.PREGNANCY: "Is there any chance you are pregnant?" "When was your last menstrual period?"      --Age Consideration--    Protocols used: ST COMMON COLD-A-    "

## 2018-01-15 ENCOUNTER — TELEPHONE (OUTPATIENT)
Dept: FAMILY MEDICINE | Facility: CLINIC | Age: 68
End: 2018-01-15

## 2018-01-15 RX ORDER — OSELTAMIVIR PHOSPHATE 75 MG/1
75 CAPSULE ORAL 2 TIMES DAILY
Qty: 10 CAPSULE | Refills: 0 | Status: SHIPPED | OUTPATIENT
Start: 2018-01-15 | End: 2018-01-20

## 2018-01-15 NOTE — TELEPHONE ENCOUNTER
Patient reports symptoms since Friday night/ Saturday morning. Reports cough,runny nose, headache, body aches, general malaise, fever 101.4.

## 2018-01-15 NOTE — TELEPHONE ENCOUNTER
"  Reason for Disposition   Requesting regular office appointment    Answer Assessment - Initial Assessment Questions  1. REASON FOR CALL or QUESTION: "What is your reason for calling today?" or "How can I best help you?" or "What question do you have that I can help answer?"      Wants an appt with PCP    Protocols used: ST INFORMATION ONLY CALL-A-AH    "

## 2018-05-16 ENCOUNTER — OFFICE VISIT (OUTPATIENT)
Dept: OPTOMETRY | Facility: CLINIC | Age: 68
End: 2018-05-16
Payer: COMMERCIAL

## 2018-05-16 DIAGNOSIS — H43.812 PVD (POSTERIOR VITREOUS DETACHMENT), LEFT EYE: Primary | ICD-10-CM

## 2018-05-16 DIAGNOSIS — H25.13 NUCLEAR AGE-RELATED CATARACT, BOTH EYES: ICD-10-CM

## 2018-05-16 PROCEDURE — 92014 COMPRE OPH EXAM EST PT 1/>: CPT | Mod: S$GLB,,, | Performed by: OPTOMETRIST

## 2018-05-16 PROCEDURE — 99999 PR PBB SHADOW E&M-EST. PATIENT-LVL II: CPT | Mod: PBBFAC,,, | Performed by: OPTOMETRIST

## 2018-05-16 NOTE — PROGRESS NOTES
HPI     EARL: 4/4/2017 per Dr. Lucero  Pt is here for her annual eye exam. She has noticed that she has a floater   in her OS x 1 month. She is not sure if she ever noticed any flashes. Pt   is happy with current Rx and has no other visual complains.Pt also has a   h/o cataracts OU.   (-)glare, halos around lights  (-)pain, irritation  No gtts      Last edited by Maria Fernanda Betancourt, OD on 5/16/2018 10:46 AM. (History)            Assessment /Plan     For exam results, see Encounter Report.    PVD (posterior vitreous detachment), left eye    Nuclear age-related cataract, both eyes      1. Symptomatic PVD OS. (-)rubén's. Educated pt of today's findings. Discussed the signs and symptoms of RD and to RTC asap if increase in flashes of floaters. RTC in 1 week for DFE OS.  2. Educated pt of today's findings. No sx needed at this time. Continue to monitor in 1 yr.    RTC in 1 week or prn.

## 2018-05-23 ENCOUNTER — OFFICE VISIT (OUTPATIENT)
Dept: OPTOMETRY | Facility: CLINIC | Age: 68
End: 2018-05-23
Payer: MEDICARE

## 2018-05-23 DIAGNOSIS — H43.812 PVD (POSTERIOR VITREOUS DETACHMENT), LEFT EYE: Primary | ICD-10-CM

## 2018-05-23 PROCEDURE — 92012 INTRM OPH EXAM EST PATIENT: CPT | Mod: S$GLB,,, | Performed by: OPTOMETRIST

## 2018-05-23 PROCEDURE — 99999 PR PBB SHADOW E&M-EST. PATIENT-LVL II: CPT | Mod: PBBFAC,,, | Performed by: OPTOMETRIST

## 2018-05-23 NOTE — PROGRESS NOTES
HPI     Pt is here for 1 week f/u for PVD OS.  Pt says that her floater is becoming less noticeable.  (-)pain, irritation  (+)floater  (-)flashes   No gtts    Last edited by Maria Fernanda Betancourt, OD on 5/23/2018 11:54 AM. (History)            Assessment /Plan     For exam results, see Encounter Report.    PVD (posterior vitreous detachment), left eye      1.PVD OS. (-)holes, tears, RD. (-)rubén's. Educated pt of today's findings. Discussed the signs and symptoms of RD and to RTC asap if increase in flashes of floaters.     RTC in 1 month for DFE OS.

## 2018-06-06 ENCOUNTER — PES CALL (OUTPATIENT)
Dept: ADMINISTRATIVE | Facility: CLINIC | Age: 68
End: 2018-06-06

## 2018-06-27 ENCOUNTER — OFFICE VISIT (OUTPATIENT)
Dept: OPTOMETRY | Facility: CLINIC | Age: 68
End: 2018-06-27
Payer: MEDICARE

## 2018-06-27 DIAGNOSIS — H43.812 PVD (POSTERIOR VITREOUS DETACHMENT), LEFT EYE: Primary | ICD-10-CM

## 2018-06-27 PROCEDURE — 99999 PR PBB SHADOW E&M-EST. PATIENT-LVL II: CPT | Mod: PBBFAC,,, | Performed by: OPTOMETRIST

## 2018-06-27 PROCEDURE — 92012 INTRM OPH EXAM EST PATIENT: CPT | Mod: S$GLB,,, | Performed by: OPTOMETRIST

## 2018-06-27 NOTE — PROGRESS NOTES
HPI     Pt is here for 1 month f/u for PVD OS.  Pt says that her floater is becoming less noticeable.  (-)pain, irritation  (+)floater  (-)flashes   No gtts    Last edited by Maria Fernanda Betancourt, OD on 6/27/2018 10:51 AM. (History)            Assessment /Plan     For exam results, see Encounter Report.    PVD (posterior vitreous detachment), left eye      1. PVD OS. (-)holes, tears, RD. (-)rubén's. Educated pt of today's findings. Discussed the signs and symptoms of RD and to RTC asap if increase in flashes of floaters.     RTC in 1 year or prn.

## 2018-07-27 ENCOUNTER — TELEPHONE (OUTPATIENT)
Dept: FAMILY MEDICINE | Facility: CLINIC | Age: 68
End: 2018-07-27

## 2018-07-27 NOTE — TELEPHONE ENCOUNTER
Patient informed regarding test. Verbalizes that she had forgotten about test. Verbalizes understanding.

## 2018-07-27 NOTE — TELEPHONE ENCOUNTER
----- Message from [employer] Astorga sent at 7/27/2018  4:50 PM CDT -----  Contact: Patient [phone]  Patient received a letter dated for 07/25/18 with test results patient stating not sure of the results has not had test done for several months requesting a call back to explain the letter please, think it was sent to wrong person.    Please call an advise  Thank you

## 2018-08-06 ENCOUNTER — OFFICE VISIT (OUTPATIENT)
Dept: DERMATOLOGY | Facility: CLINIC | Age: 68
End: 2018-08-06
Payer: MEDICARE

## 2018-08-06 DIAGNOSIS — L30.4 INTERTRIGO: ICD-10-CM

## 2018-08-06 DIAGNOSIS — Z12.83 SCREENING EXAM FOR SKIN CANCER: ICD-10-CM

## 2018-08-06 DIAGNOSIS — D18.00 ANGIOMA: ICD-10-CM

## 2018-08-06 DIAGNOSIS — L82.1 SK (SEBORRHEIC KERATOSIS): Primary | ICD-10-CM

## 2018-08-06 DIAGNOSIS — L91.8 SKIN TAG: ICD-10-CM

## 2018-08-06 DIAGNOSIS — L72.0 MILIA: ICD-10-CM

## 2018-08-06 DIAGNOSIS — D22.9 MULTIPLE BENIGN NEVI: ICD-10-CM

## 2018-08-06 DIAGNOSIS — L57.0 AK (ACTINIC KERATOSIS): ICD-10-CM

## 2018-08-06 DIAGNOSIS — L72.0 EIC (EPIDERMAL INCLUSION CYST): ICD-10-CM

## 2018-08-06 PROCEDURE — 99203 OFFICE O/P NEW LOW 30 MIN: CPT | Mod: 25,S$GLB,, | Performed by: DERMATOLOGY

## 2018-08-06 PROCEDURE — 17000 DESTRUCT PREMALG LESION: CPT | Mod: S$GLB,,, | Performed by: DERMATOLOGY

## 2018-08-06 PROCEDURE — 99999 PR PBB SHADOW E&M-EST. PATIENT-LVL III: CPT | Mod: PBBFAC,,, | Performed by: DERMATOLOGY

## 2018-08-06 RX ORDER — ECONAZOLE NITRATE 10 MG/G
CREAM TOPICAL
Qty: 85 G | Refills: 2 | Status: SHIPPED | OUTPATIENT
Start: 2018-08-06 | End: 2021-10-13

## 2018-08-06 NOTE — PATIENT INSTRUCTIONS

## 2018-08-06 NOTE — PROGRESS NOTES
Subjective:       Patient ID:  Charu Lopez is a 68 y.o. female who presents for   Chief Complaint   Patient presents with    Skin Check     TBSE      Pt last seen by dr. Cavanaugh. Wants TBSE - has a long list of spots to be checked (scanned) including rash under arms x few months. Not itchy. No prev treatment  No h/o nmsc or mm          Review of Systems   Skin: Positive for activity-related sunscreen use. Negative for daily sunscreen use and recent sunburn.   Hematologic/Lymphatic: Does not bruise/bleed easily.        Objective:    Physical Exam   Constitutional: She appears well-developed and well-nourished. No distress.   Neurological: She is alert and oriented to person, place, and time. She is not disoriented.   Psychiatric: She has a normal mood and affect.   Skin:   Areas Examined (abnormalities noted in diagram):   Scalp / Hair Palpated and Inspected  Head / Face Inspection Performed  Neck Inspection Performed  Chest / Axilla Inspection Performed  Abdomen Inspection Performed  Genitals / Buttocks / Groin Inspection Performed  Back Inspection Performed  RUE Inspected  LUE Inspection Performed  RLE Inspected  LLE Inspection Performed  Nails and Digits Inspection Performed                           Diagram Legend     Erythematous scaling macule/papule c/w actinic keratosis       Vascular papule c/w angioma      Pigmented verrucoid papule/plaque c/w seborrheic keratosis      Yellow umbilicated papule c/w sebaceous hyperplasia      Irregularly shaped tan macule c/w lentigo     1-2 mm smooth white papules consistent with Milia      Movable subcutaneous cyst with punctum c/w epidermal inclusion cyst      Subcutaneous movable cyst c/w pilar cyst      Firm pink to brown papule c/w dermatofibroma      Pedunculated fleshy papule(s) c/w skin tag(s)      Evenly pigmented macule c/w junctional nevus     Mildly variegated pigmented, slightly irregular-bordered macule c/w mildly atypical nevus       Flesh colored to evenly pigmented papule c/w intradermal nevus       Pink pearly papule/plaque c/w basal cell carcinoma      Erythematous hyperkeratotic cursted plaque c/w SCC      Surgical scar with no sign of skin cancer recurrence      Open and closed comedones      Inflammatory papules and pustules      Verrucoid papule consistent consistent with wart     Erythematous eczematous patches and plaques     Dystrophic onycholytic nail with subungual debris c/w onychomycosis     Umbilicated papule    Erythematous-base heme-crusted tan verrucoid plaque consistent with inflamed seborrheic keratosis     Erythematous Silvery Scaling Plaque c/w Psoriasis     See annotation      Assessment / Plan:        SK (seborrheic keratosis)  These are benign inherited growths without a malignant potential. Reassurance given to patient. No treatment is necessary.       Angiomas  This is a benign vascular lesion. Reassurance given. No treatment required.       EIC (epidermal inclusion cyst)  Reassurance given to patient. No treatment is necessary.   Treatment of benign, asymptomatic lesions may be considered cosmetic.    Intertrigo  Recommend white vinegar: water 1:1 compresses 2x/day followed by cool blow dry and then application of prescription medication.     Cool blow dry after showering. Once clear, use Zeasorb AF powder for maintenance.    -     econazole nitrate 1 % cream; AAA bid  Dispense: 85 g; Refill: 2    Multiple benign nevi  total body skin examination performed today including at least 12 points as noted in physical examination. No lesions suspicious for malignancy noted.  Reassurance provided.  Instructed patient to observe lesion(s) for changes and follow up in clinic if changes are noted. Discussed ABCDE's of moles and brochure provided.      Skin tags and Milia  Reassurance given to patient. No treatment is necessary.   Treatment of benign, asymptomatic lesions may be considered cosmetic.      AK (actinic  keratosis)  Cryosurgery Procedure Note    Verbal consent from the patient is obtained including, but not limited to, risk of hypopigmentation/hyperpigmentation, scar, recurrence of lesion. The patient is aware of the precancerous quality and need for treatment of these lesions. Liquid nitrogen cryosurgery is applied to the 1 actinic keratoses, as detailed in the physical exam, to produce a freeze injury. The patient is aware that blisters may form and is instructed on wound care with gentle cleansing and use of vaseline ointment to keep moist until healed. The patient is supplied a handout on cryosurgery and is instructed to call if lesions do not completely resolve.      Screening exam for skin cancer  Total body skin examination performed today including at least 12 points as noted in physical examination. No lesions suspicious for malignancy noted.               Follow-up in about 1 year (around 8/6/2019).

## 2018-08-13 ENCOUNTER — CLINICAL SUPPORT (OUTPATIENT)
Dept: URGENT CARE | Facility: CLINIC | Age: 68
End: 2018-08-13

## 2018-08-13 DIAGNOSIS — Z02.83 ENCOUNTER FOR DRUG SCREENING: Primary | ICD-10-CM

## 2018-08-13 DIAGNOSIS — Z00.00 PHYSICAL EXAM: Primary | ICD-10-CM

## 2018-08-13 PROCEDURE — 80305 DRUG TEST PRSMV DIR OPT OBS: CPT | Mod: S$GLB,,, | Performed by: PREVENTIVE MEDICINE

## 2018-08-13 PROCEDURE — 99199 UNLISTED SPECIAL SVC PX/RPRT: CPT | Mod: S$GLB,,, | Performed by: NURSE PRACTITIONER

## 2018-08-13 PROCEDURE — 99499 UNLISTED E&M SERVICE: CPT | Mod: S$GLB,,, | Performed by: PREVENTIVE MEDICINE

## 2018-08-17 ENCOUNTER — TELEPHONE (OUTPATIENT)
Dept: FAMILY MEDICINE | Facility: CLINIC | Age: 68
End: 2018-08-17

## 2018-08-17 DIAGNOSIS — E78.01 FAMILIAL HYPERCHOLESTEROLEMIA: ICD-10-CM

## 2018-08-17 DIAGNOSIS — E34.9 ENDOCRINE DISEASE: ICD-10-CM

## 2018-08-17 DIAGNOSIS — Z00.00 ROUTINE GENERAL MEDICAL EXAMINATION AT A HEALTH CARE FACILITY: Primary | ICD-10-CM

## 2018-08-17 DIAGNOSIS — R94.6 ABNORMAL RESULTS OF THYROID FUNCTION STUDIES: ICD-10-CM

## 2018-08-17 NOTE — TELEPHONE ENCOUNTER
----- Message from Maris Mendoza sent at 8/17/2018 10:11 AM CDT -----  Contact: self  Doctor appointment and lab have been scheduled.  Please link lab orders to the lab appointment.  Date of doctor appointment:  12/4  Physical or EP: phys   Date of lab appointment: 11/23   Comments:

## 2018-08-17 NOTE — TELEPHONE ENCOUNTER
Per patient insurance they will not cover a routine TSH and lipid panel patient has medicare do you have a DX's that can cover the non covered labs.

## 2018-08-21 ENCOUNTER — LAB VISIT (OUTPATIENT)
Dept: LAB | Facility: HOSPITAL | Age: 68
End: 2018-08-21
Attending: FAMILY MEDICINE
Payer: MEDICARE

## 2018-08-21 DIAGNOSIS — E34.9 ENDOCRINE DISEASE: ICD-10-CM

## 2018-08-21 DIAGNOSIS — R94.6 ABNORMAL RESULTS OF THYROID FUNCTION STUDIES: ICD-10-CM

## 2018-08-21 DIAGNOSIS — Z00.00 ROUTINE GENERAL MEDICAL EXAMINATION AT A HEALTH CARE FACILITY: ICD-10-CM

## 2018-08-21 DIAGNOSIS — E78.01 FAMILIAL HYPERCHOLESTEROLEMIA: ICD-10-CM

## 2018-08-21 LAB
ALBUMIN SERPL BCP-MCNC: 3.7 G/DL
ALP SERPL-CCNC: 100 U/L
ALT SERPL W/O P-5'-P-CCNC: 23 U/L
ANION GAP SERPL CALC-SCNC: 7 MMOL/L
AST SERPL-CCNC: 21 U/L
BASOPHILS # BLD AUTO: 0.04 K/UL
BASOPHILS NFR BLD: 0.8 %
BILIRUB SERPL-MCNC: 0.4 MG/DL
BUN SERPL-MCNC: 16 MG/DL
CALCIUM SERPL-MCNC: 9.7 MG/DL
CHLORIDE SERPL-SCNC: 105 MMOL/L
CHOLEST SERPL-MCNC: 110 MG/DL
CHOLEST/HDLC SERPL: 1.7 {RATIO}
CO2 SERPL-SCNC: 27 MMOL/L
CREAT SERPL-MCNC: 1 MG/DL
DIFFERENTIAL METHOD: ABNORMAL
EOSINOPHIL # BLD AUTO: 0.2 K/UL
EOSINOPHIL NFR BLD: 4.5 %
ERYTHROCYTE [DISTWIDTH] IN BLOOD BY AUTOMATED COUNT: 13.2 %
EST. GFR  (AFRICAN AMERICAN): >60 ML/MIN/1.73 M^2
EST. GFR  (NON AFRICAN AMERICAN): 58.8 ML/MIN/1.73 M^2
GLUCOSE SERPL-MCNC: 106 MG/DL
HCT VFR BLD AUTO: 46.9 %
HDLC SERPL-MCNC: 63 MG/DL
HDLC SERPL: 57.3 %
HGB BLD-MCNC: 14.7 G/DL
IMM GRANULOCYTES # BLD AUTO: 0.01 K/UL
IMM GRANULOCYTES NFR BLD AUTO: 0.2 %
LDLC SERPL CALC-MCNC: 42.2 MG/DL
LYMPHOCYTES # BLD AUTO: 1.7 K/UL
LYMPHOCYTES NFR BLD: 35.1 %
MCH RBC QN AUTO: 30.6 PG
MCHC RBC AUTO-ENTMCNC: 31.3 G/DL
MCV RBC AUTO: 98 FL
MONOCYTES # BLD AUTO: 0.6 K/UL
MONOCYTES NFR BLD: 12.5 %
NEUTROPHILS # BLD AUTO: 2.3 K/UL
NEUTROPHILS NFR BLD: 46.9 %
NONHDLC SERPL-MCNC: 47 MG/DL
NRBC BLD-RTO: 0 /100 WBC
PLATELET # BLD AUTO: 266 K/UL
PMV BLD AUTO: 11.7 FL
POTASSIUM SERPL-SCNC: 4.3 MMOL/L
PROT SERPL-MCNC: 7.2 G/DL
RBC # BLD AUTO: 4.8 M/UL
SODIUM SERPL-SCNC: 139 MMOL/L
TRIGL SERPL-MCNC: 24 MG/DL
TSH SERPL DL<=0.005 MIU/L-ACNC: 1.34 UIU/ML
WBC # BLD AUTO: 4.87 K/UL

## 2018-08-21 PROCEDURE — 85025 COMPLETE CBC W/AUTO DIFF WBC: CPT

## 2018-08-21 PROCEDURE — 80061 LIPID PANEL: CPT

## 2018-08-21 PROCEDURE — 36415 COLL VENOUS BLD VENIPUNCTURE: CPT | Mod: PO

## 2018-08-21 PROCEDURE — 80053 COMPREHEN METABOLIC PANEL: CPT

## 2018-08-21 PROCEDURE — 84443 ASSAY THYROID STIM HORMONE: CPT

## 2018-08-28 ENCOUNTER — OFFICE VISIT (OUTPATIENT)
Dept: INTERNAL MEDICINE | Facility: CLINIC | Age: 68
End: 2018-08-28
Payer: MEDICARE

## 2018-08-28 VITALS
BODY MASS INDEX: 30.9 KG/M2 | WEIGHT: 192.25 LBS | DIASTOLIC BLOOD PRESSURE: 74 MMHG | SYSTOLIC BLOOD PRESSURE: 122 MMHG | OXYGEN SATURATION: 98 % | HEART RATE: 87 BPM | HEIGHT: 66 IN | TEMPERATURE: 99 F

## 2018-08-28 DIAGNOSIS — L72.3 SEBACEOUS CYST: Primary | ICD-10-CM

## 2018-08-28 DIAGNOSIS — I72.4 FEMORAL ARTERY ANEURYSM: ICD-10-CM

## 2018-08-28 DIAGNOSIS — Z00.00 ROUTINE MEDICAL EXAM: ICD-10-CM

## 2018-08-28 PROCEDURE — 99999 PR PBB SHADOW E&M-EST. PATIENT-LVL IV: CPT | Mod: PBBFAC,,, | Performed by: NURSE PRACTITIONER

## 2018-08-28 PROCEDURE — 99499 UNLISTED E&M SERVICE: CPT | Mod: HCNC,S$GLB,, | Performed by: NURSE PRACTITIONER

## 2018-08-28 PROCEDURE — 99214 OFFICE O/P EST MOD 30 MIN: CPT | Mod: S$GLB,,, | Performed by: NURSE PRACTITIONER

## 2018-08-28 NOTE — PATIENT INSTRUCTIONS
Prevention Guidelines, Women Ages 65 and Older  Screening tests and vaccines are an important part of managing your health. Health counseling is essential, too. Below are guidelines for these, for women ages 65 and older. Talk with your healthcare provider to make sure youre up to date on what you need.  Screening Who needs it How often   Type 2 diabetes or prediabetes All adults beginning at age 45 and adults without symptoms at any age who are overweight or obese and have 1 or more additional risk factors for diabetes At least every 3 years   Alcohol misuse All women in this age group At routine exams   Blood pressure All women in this age group Every 2 years if your blood pressure is less than 120/80 mm Hg; yearly if your systolic blood pressure is 120 to 139 mm Hg, or your diastolic blood pressure reading is 80 to 89 mm Hg   Breast cancer All women in this age group Yearly mammogram and clinical breast exam1   Cervical cancer Only women who had abnormal screening results before age 65 Talk with your healthcare provider   Chlamydia Women at increased risk for infection At routine exams   Colorectal cancer All women in this age group1 Flexible sigmoidoscopy every 5 years, or colonoscopy every 10 years, or double-contrast barium enema every 5 years; yearly fecal occult blood test or fecal immunochemical test; or a stool DNA test as often as your healthcare provider advises; talk with your healthcare provider about which tests are best for you   Depression All women in this age group At routine exams   Gonorrhea Sexually active women at increased risk for infection At routine exams   Hepatitis C Anyone at increased risk; 1 time for those born between 1945 and 1965 At routine exams   High cholesterol or triglycerides All women in this age group who are at risk for coronary artery disease At least every 5 years   HIV Women at increased risk for infection - talk with your healthcare provider At routine exams   Lung  cancer Adults age 55 to 80 who have smoked Yearly screening in smokers with 30 pack-year history of smoking or who quit within 15 years   Obesity All women in this age group At routine exams   Osteoporosis All women in this age group Bone density test at age 65, then follow-up as advised by your healthcare provider   Syphilis Women at increased risk for infection - talk with your healthcare provider At routine exams   Thyroid-Stimulating Hormone (TSH) All women in this age group Every 5 years   Tuberculosis Women at increased risk for infection - talk with your healthcare provider Ask your healthcare provider   Vision All women in this age group Every 1 to 2 years; if you have a chronic health condition, ask your healthcare provider if you need exams more often   Vaccine Who needs it How often   Chickenpox (varicella) All women in this age group who have no record of this infection or vaccine 2 doses; second dose should be given at least 4 weeks after the first dose   Hepatitis A Women at increased risk for infection - talk with your healthcare provider 2 doses given 6 months apart   Hepatitis B Women at increased risk for infection - talk with your healthcare provider 3 doses over 6 months; second dose should be given 1 month after the first dose; the third dose should be given at least 2 months after the second dose and at least 4 months after the first dose   Haemophilus influenza Type B (HIB) Women at increased risk for infection - talk with your healthcare provider 1 to 3 doses   Influenza (flu) All women in this age group Once a year   Pneumococcal conjugate vaccine (PCV13) and pneumococcal polysaccharide vaccine (PPSV23) All women in this age group 1 dose of each vaccine   Tetanus/diphtheria/pertussis (Td/Tdap) booster All women in this age group Td every 10 years, or a one-time dose of Tdap instead of a Td booster after age 18, then Td every 10 years   Zoster All women in this age group 1 dose   Counseling  Who needs it How often   Diet and exercise Women who are overweight or obese When diagnosed, and then at routine exams   Fall prevention (exercise and vitamin D supplements) All women in this age group At routine exams   Sexually transmitted infection prevention Women at increased risk for infection - talk with your healthcare provider At routine exams   Use of daily aspirin Women ages 55 and up in this age group who are at risk for cardiovascular health problems such as stroke When your risk is known   Use of tobacco and the health effects it can cause All women in this age group Every exam   1[employer]  Date Last Reviewed: 8/9/2015  © 2815-7305 [employer]. [address] [place] [address], [place] [address]. All rights reserved. This information is not intended as a substitute for professional medical care. Always follow your healthcare professional's instructions.

## 2018-08-28 NOTE — PROGRESS NOTES
Subjective:       Patient ID: Charu Lopez is a 68 y.o. female.    Chief Complaint: Annual Exam    Pt here for routine medical exam and has a area on the back of her scalp that is raised and itchy  NO other complaitns        Review of Systems   Constitutional: Negative for activity change, appetite change and chills.   Respiratory: Negative for cough and shortness of breath.    Cardiovascular: Negative for chest pain and palpitations.   Gastrointestinal: Negative for abdominal pain, diarrhea, nausea and vomiting.   Musculoskeletal: Negative for arthralgias, myalgias, neck pain and neck stiffness.   Skin: Negative for rash.   Hematological: Negative for adenopathy. Does not bruise/bleed easily.   Psychiatric/Behavioral: Negative for sleep disturbance.         Past Medical History:   Diagnosis Date    Cataract     Endometrioma of ovary     Osteopenia      Past Surgical History:   Procedure Laterality Date    BREAST BIOPSY  91    wnl     ENDOMETRIAL BIOPSY  12/12/2012    laparatomy 1994      OOPHORECTOMY  1995    Left ov removal due to endometrioma     Social History     Social History Narrative    Not on file     Family History   Problem Relation Age of Onset    Cancer Father     Colon cancer Father 58    Diabetes Father     Hypertension Father     Stroke Mother     Dementia Mother     Breast cancer Neg Hx     Ovarian cancer Neg Hx     Amblyopia Neg Hx     Strabismus Neg Hx     Thyroid disease Neg Hx     Retinal detachment Neg Hx     Macular degeneration Neg Hx     Glaucoma Neg Hx     Cataracts Neg Hx     Blindness Neg Hx     Melanoma Neg Hx     Psoriasis Neg Hx      Outpatient Encounter Medications as of 8/28/2018   Medication Sig Dispense Refill    multivitamin (ONE DAILY MULTIVITAMIN) per tablet Take 1 tablet by mouth once daily.      econazole nitrate 1 % cream AAA bid 85 g 2     No facility-administered encounter medications on file as of 8/28/2018.      Last 3 sets of  "Vitals  Vitals - 1 value per visit 5/23/2018 6/27/2018 8/28/2018   SYSTOLIC - - 122   DIASTOLIC - - 74   PULSE - - 87   TEMPERATURE - - 98.7   RESPIRATIONS - - -   SPO2 - - 98   Weight (lb) - - 192.24   Weight (kg) - - 87.2   HEIGHT - - 5' 6"   BODY MASS INDEX - - 31.03   VISIT REPORT - - -   Pain Score  0 0 0         Objective:      Physical Exam   Constitutional: She is oriented to person, place, and time. She appears well-developed and well-nourished. No distress.   HENT:   Head: Normocephalic and atraumatic.   Right Ear: External ear normal.   Left Ear: External ear normal.   Nose: Nose normal.   Mouth/Throat: Oropharynx is clear and moist. No oropharyngeal exudate.   Eyes: Conjunctivae and EOM are normal. Pupils are equal, round, and reactive to light. Right eye exhibits no discharge. No scleral icterus.   Neck: Normal range of motion. Neck supple. No JVD present. No thyromegaly present.   Cardiovascular: Normal rate, regular rhythm, normal heart sounds and intact distal pulses.   No murmur heard.  Pulmonary/Chest: Effort normal and breath sounds normal. No respiratory distress. She has no wheezes.   Abdominal: Soft. Bowel sounds are normal. She exhibits no distension and no mass. There is no tenderness.   Musculoskeletal: Normal range of motion. She exhibits no edema or tenderness.   Lymphadenopathy:     She has no cervical adenopathy.   Neurological: She is alert and oriented to person, place, and time. She has normal reflexes. No cranial nerve deficit. She exhibits normal muscle tone.   Skin: Skin is warm and dry. No rash noted. She is not diaphoretic. No erythema.        1-2mm raised, subcutaneous cyst like lesion noted  No erythema  No drainage  Non tender     Psychiatric: She has a normal mood and affect. Her behavior is normal. Judgment and thought content normal.   Nursing note and vitals reviewed.          Lab Results   Component Value Date    WBC 4.87 08/21/2018    RBC 4.80 08/21/2018    HGB 14.7 " 08/21/2018    HCT 46.9 08/21/2018    MCV 98 08/21/2018    MCH 30.6 08/21/2018    MCHC 31.3 (L) 08/21/2018    RDW 13.2 08/21/2018     08/21/2018    MPV 11.7 08/21/2018    GRAN 2.3 08/21/2018    GRAN 46.9 08/21/2018    LYMPH 1.7 08/21/2018    LYMPH 35.1 08/21/2018    MONO 0.6 08/21/2018    MONO 12.5 08/21/2018    EOS 0.2 08/21/2018    BASO 0.04 08/21/2018    EOSINOPHIL 4.5 08/21/2018    BASOPHIL 0.8 08/21/2018     Lab Results   Component Value Date    WBC 4.87 08/21/2018    HGB 14.7 08/21/2018    HCT 46.9 08/21/2018     08/21/2018    CHOL 110 (L) 08/21/2018    TRIG 24 (L) 08/21/2018    HDL 63 08/21/2018    ALT 23 08/21/2018    AST 21 08/21/2018     08/21/2018    K 4.3 08/21/2018     08/21/2018    CREATININE 1.0 08/21/2018    BUN 16 08/21/2018    CO2 27 08/21/2018    TSH 1.343 08/21/2018    INR 0.9 04/11/2013    GLUF 79 03/02/2006    HGBA1C 5.8 03/22/2017       Assessment:       1. Sebaceous cyst    2. Routine medical exam    3. Femoral artery aneurysm        Plan:         Pt declined pneumococcal    Patient Counseling:  --Nutrition: Stressed importance of moderation in sodium/caffeine intake, saturated fat and cholesterol, caloric balance, sufficient intake of fresh fruits, vegetables, fiber, calcium, iron, and 1 mg of folate supplement per day (for females capable of pregnancy).  --Exercise: Stressed the importance of regular exercise.   --Substance Abuse: Discussed cessation/primary prevention of tobacco, alcohol, or other drug use; driving or other dangerous activities under the influence; availability of treatment for abuse.   --Sexuality: Discussed sexually transmitted diseases, partner selection, use of condoms, avoidance of unintended pregnancy and contraceptive alternatives.   --Injury prevention: Discussed safety belts, safety helmets, smoke detector.  --Dental health: Discussed importance of regular tooth brushing, flossing, and dental visits.  --Immunizations reviewed.        Charu was seen today for annual exam.    Diagnoses and all orders for this visit:    Sebaceous cyst    Routine medical exam  Comments:  fasting labs reviewed in detail      Femoral artery aneurysm  Comments:  left  no sx at this time will monitor        Patient Instructions       Prevention Guidelines, Women Ages 65 and Older  Screening tests and vaccines are an important part of managing your health. Health counseling is essential, too. Below are guidelines for these, for women ages 65 and older. Talk with your healthcare provider to make sure youre up to date on what you need.  Screening Who needs it How often   Type 2 diabetes or prediabetes All adults beginning at age 45 and adults without symptoms at any age who are overweight or obese and have 1 or more additional risk factors for diabetes At least every 3 years   Alcohol misuse All women in this age group At routine exams   Blood pressure All women in this age group Every 2 years if your blood pressure is less than 120/80 mm Hg; yearly if your systolic blood pressure is 120 to 139 mm Hg, or your diastolic blood pressure reading is 80 to 89 mm Hg   Breast cancer All women in this age group Yearly mammogram and clinical breast exam1   Cervical cancer Only women who had abnormal screening results before age 65 Talk with your healthcare provider   Chlamydia Women at increased risk for infection At routine exams   Colorectal cancer All women in this age group1 Flexible sigmoidoscopy every 5 years, or colonoscopy every 10 years, or double-contrast barium enema every 5 years; yearly fecal occult blood test or fecal immunochemical test; or a stool DNA test as often as your healthcare provider advises; talk with your healthcare provider about which tests are best for you   Depression All women in this age group At routine exams   Gonorrhea Sexually active women at increased risk for infection At routine exams   Hepatitis C Anyone at increased risk; 1 time for  those born between 1945 and 1965 At routine exams   High cholesterol or triglycerides All women in this age group who are at risk for coronary artery disease At least every 5 years   HIV Women at increased risk for infection - talk with your healthcare provider At routine exams   Lung cancer Adults age 55 to 80 who have smoked Yearly screening in smokers with 30 pack-year history of smoking or who quit within 15 years   Obesity All women in this age group At routine exams   Osteoporosis All women in this age group Bone density test at age 65, then follow-up as advised by your healthcare provider   Syphilis Women at increased risk for infection - talk with your healthcare provider At routine exams   Thyroid-Stimulating Hormone (TSH) All women in this age group Every 5 years   Tuberculosis Women at increased risk for infection - talk with your healthcare provider Ask your healthcare provider   Vision All women in this age group Every 1 to 2 years; if you have a chronic health condition, ask your healthcare provider if you need exams more often   Vaccine Who needs it How often   Chickenpox (varicella) All women in this age group who have no record of this infection or vaccine 2 doses; second dose should be given at least 4 weeks after the first dose   Hepatitis A Women at increased risk for infection - talk with your healthcare provider 2 doses given 6 months apart   Hepatitis B Women at increased risk for infection - talk with your healthcare provider 3 doses over 6 months; second dose should be given 1 month after the first dose; the third dose should be given at least 2 months after the second dose and at least 4 months after the first dose   Haemophilus influenza Type B (HIB) Women at increased risk for infection - talk with your healthcare provider 1 to 3 doses   Influenza (flu) All women in this age group Once a year   Pneumococcal conjugate vaccine (PCV13) and pneumococcal polysaccharide vaccine (PPSV23) All  women in this age group 1 dose of each vaccine   Tetanus/diphtheria/pertussis (Td/Tdap) booster All women in this age group Td every 10 years, or a one-time dose of Tdap instead of a Td booster after age 18, then Td every 10 years   Zoster All women in this age group 1 dose   Counseling Who needs it How often   Diet and exercise Women who are overweight or obese When diagnosed, and then at routine exams   Fall prevention (exercise and vitamin D supplements) All women in this age group At routine exams   Sexually transmitted infection prevention Women at increased risk for infection - talk with your healthcare provider At routine exams   Use of daily aspirin Women ages 55 and up in this age group who are at risk for cardiovascular health problems such as stroke When your risk is known   Use of tobacco and the health effects it can cause All women in this age group Every exam   1American [employer]  Date Last Reviewed: 8/9/2015  © 9081-6172 [employer]. [address] [place] [address], [place] [address]. All rights reserved. This information is not intended as a substitute for professional medical care. Always follow your healthcare professional's instructions.

## 2018-09-17 ENCOUNTER — PES CALL (OUTPATIENT)
Dept: ADMINISTRATIVE | Facility: CLINIC | Age: 68
End: 2018-09-17

## 2018-10-01 ENCOUNTER — TELEPHONE (OUTPATIENT)
Dept: PRIMARY CARE CLINIC | Facility: CLINIC | Age: 68
End: 2018-10-01

## 2018-10-01 DIAGNOSIS — Z12.39 SCREENING BREAST EXAMINATION: Primary | ICD-10-CM

## 2018-10-01 NOTE — TELEPHONE ENCOUNTER
----- Message from Thalia Marie sent at 10/1/2018  8:01 AM CDT -----  Contact: Self/[phone]  Caller is requesting to schedule their annual screening mammogram appointment. Order is not listed in Epic.  Please enter order and contact patient to schedule.    Where would they like the mammogram performed?:  [facility]  Additional information: fax orders to [phone]

## 2018-10-01 NOTE — TELEPHONE ENCOUNTER
Patient informed that mammogram order has been faxed to "[facility] Imaging @ [phone]. Patient verbalizes understanding.

## 2018-10-01 NOTE — TELEPHONE ENCOUNTER
Need external mammo order to go to [facility] imaging center fax orders to [phone]

## 2018-11-13 ENCOUNTER — TELEPHONE (OUTPATIENT)
Dept: FAMILY MEDICINE | Facility: CLINIC | Age: 68
End: 2018-11-13

## 2018-11-13 ENCOUNTER — OFFICE VISIT (OUTPATIENT)
Dept: FAMILY MEDICINE | Facility: CLINIC | Age: 68
End: 2018-11-13
Payer: MEDICARE

## 2018-11-13 VITALS
HEIGHT: 67 IN | TEMPERATURE: 98 F | DIASTOLIC BLOOD PRESSURE: 80 MMHG | RESPIRATION RATE: 20 BRPM | BODY MASS INDEX: 29.79 KG/M2 | WEIGHT: 189.81 LBS | SYSTOLIC BLOOD PRESSURE: 100 MMHG

## 2018-11-13 DIAGNOSIS — R50.9 FEVER, UNSPECIFIED FEVER CAUSE: Primary | ICD-10-CM

## 2018-11-13 DIAGNOSIS — R09.81 NASAL CONGESTION: ICD-10-CM

## 2018-11-13 LAB
FLUAV AG SPEC QL IA: NEGATIVE
FLUBV AG SPEC QL IA: NEGATIVE
SPECIMEN SOURCE: NORMAL

## 2018-11-13 PROCEDURE — 99999 PR PBB SHADOW E&M-EST. PATIENT-LVL III: CPT | Mod: PBBFAC,HCNC,, | Performed by: FAMILY MEDICINE

## 2018-11-13 PROCEDURE — 1100F PTFALLS ASSESS-DOCD GE2>/YR: CPT | Mod: CPTII,HCNC,S$GLB, | Performed by: FAMILY MEDICINE

## 2018-11-13 PROCEDURE — 3288F FALL RISK ASSESSMENT DOCD: CPT | Mod: CPTII,HCNC,S$GLB, | Performed by: FAMILY MEDICINE

## 2018-11-13 PROCEDURE — 99213 OFFICE O/P EST LOW 20 MIN: CPT | Mod: HCNC,S$GLB,, | Performed by: FAMILY MEDICINE

## 2018-11-13 PROCEDURE — 87400 INFLUENZA A/B EACH AG IA: CPT | Mod: 59,HCNC,PO

## 2018-11-13 RX ORDER — IPRATROPIUM BROMIDE 21 UG/1
2 SPRAY, METERED NASAL 2 TIMES DAILY
Qty: 30 ML | Refills: 1 | Status: SHIPPED | OUTPATIENT
Start: 2018-11-13 | End: 2019-11-05

## 2018-11-13 NOTE — PATIENT INSTRUCTIONS
Understanding the Cold Virus  Colds are the most common illness that people get. Most adults get 2 or 3 colds per year, and most children get 5 to 7 colds per year. Colds may be caused by over 200 types of viruses. The most common of these are rhinoviruses (rhino refers to the nose).  What causes a cold virus?  All colds start with infection by a virus. You can be infected by more than one cold virus at a time. Infection with cold viruses happens when:  · You breathe in a virus from the air. This can happen when someone with a cold sneezes or coughs near you.  · You touch your eyes, nose, or mouth when your hand has a cold virus on it. This can happen if you touch an object that has the cold virus on it.  What are the symptoms of a cold virus?  Almost all colds involve a stuffy nose. Other common symptoms include:  · Runny nose  · Sneezing  · Sore throat  · Headache  · Cough  How is a cold treated?  Colds usually last 5 to 10 days. Treatment focuses on relieving symptoms. Treatments may include:  · Decongestant medicines. Several types of decongestants are available without prescription. These may help reduce stuffy or runny nose symptoms.  · Prescription or over-the-counter nasal sprays. These may help reduce nasal symptoms, including stuffiness.  · Prescription or over-the-counter pain medicines. These can help with headaches and sore throat.  · Self-care. This includes extra rest, using humidifiers, and drinking more fluids. These help you feel better while you are getting over a cold.  Antibiotics are not helpful for a cold. They do not make a cold shorter or relieve symptoms. Taking antibiotics when you dont need them can make them work less well when you need them for another illness.  Follow all directions for using medicines, especially when giving them to children. Contact your healthcare provider if you have any questions about using cold medicines safely.  Can a cold be prevented?  You can help  reduce the spread of cold viruses. This can help both you and others avoid getting colds. Follow these tips:  · Wash your hands well anytime you may have come into contact with cold viruses. Wash your hands for at least 20 seconds. When you cant wash with soap and water, use an alcohol-based hand .  · Dont touch your nose, eyes, or mouth, especially after touching something that may have a cold virus on it.  · Cover your mouth and nose when you cough or sneeze. Throw away tissues after using them.  · Disinfect things you touch often, such as phones and keyboards.    · Stay home when you have a cold.  What are the possible complications of a cold virus?  Colds usually go away by themselves. But its not unusual to get another type of infection while you have a cold. These can include:  · Sinus infection  · Lung infection, such as bronchitis or pneumonia  · Ear infection  If you have asthma or chronic bronchitis, a cold can make your condition worse.     When should I call my healthcare provider?  Call your healthcare provider right away if you have any of these:  · Fever of 100.4°F (38°C) or higher, or as directed  · Cough, chest pain, or shortness of breath that gets worse  · Symptoms dont get better or get worse after about 10 days  · Headache, sleepiness, or confusion that gets worse   Date Last Reviewed: 3/28/2016  © 0561-7495 [employer]. [address] [place] [address], [place] [address]. All rights reserved. This information is not intended as a substitute for professional medical care. Always follow your healthcare professional's instructions.

## 2018-11-14 NOTE — PROGRESS NOTES
Pt complaining of facial pressure and cough, clear nasal discharge.  Low grade fever sore throat resolved 2 days ago  Past Medical History:   Diagnosis Date    Cataract     Endometrioma of ovary     Osteopenia      Social History     Socioeconomic History    Marital status: Single     Spouse name: Not on file    Number of children: Not on file    Years of education: Not on file    Highest education level: Not on file   Social Needs    Financial resource strain: Not on file    Food insecurity - worry: Not on file    Food insecurity - inability: Not on file    Transportation needs - medical: Not on file    Transportation needs - non-medical: Not on file   Occupational History    Not on file   Tobacco Use    Smoking status: Never Smoker    Smokeless tobacco: Never Used   Substance and Sexual Activity    Alcohol use: No    Drug use: No    Sexual activity: Not Currently     Comment: , menarche 14,  age    Other Topics Concern    Are you pregnant or think you may be? Not Asked    Breast-feeding Not Asked   Social History Narrative    Not on file     Physical Exam:  VS:  Vitals:    11/13/18 1437   BP: 100/80   Resp: 20   Temp: 98.1 °F (36.7 °C)       Heent: TM's clear bilaterally. Ear canalls clear bilaterally. Frontal and Maxillary  Sinuses tender to percussion bilaterally. Throat clear , no exudate.PERRLA. conjuctiva clear bilaterally.  Neck: Supple nontender, no thyromegaly, no carotid bruits bilaterally.  Cardiovascular: Regular rate and rhythm no murmurs auscultated.  Lungs: Clear to auscultation bilaterally, with good respiratory excursion, no evidence of respiratory distress.  Abdomen: Soft nontender, normal bowel sounds no hepatosplenomegaly.  Skin: Good turgor, no lesions observed.  Impression: Acute viral respiratory infection                     Upper respiratory infection  Plan: See med card dated 11/13/2018   multivitamin (ONE DAILY MULTIVITAMIN) per tablet 1 tablet, Daily         Nasal Anticholinergics    ipratropium (ATROVENT) 0.03 % nasal spray        See med orders dated 11/13/2018   Influenza antigen Nasopharyngeal Swab   Pt will call for results.

## 2018-11-15 ENCOUNTER — TELEPHONE (OUTPATIENT)
Dept: INTERNAL MEDICINE | Facility: CLINIC | Age: 68
End: 2018-11-15

## 2018-11-15 NOTE — TELEPHONE ENCOUNTER
----- Message from Oly Roth sent at 11/15/2018 10:54 AM CST -----  Contact: call pt [phone]  Calling to speak with you about her annual physical, she is stating that is was coded wrong, not coded as annual

## 2018-11-15 NOTE — TELEPHONE ENCOUNTER
----- Message from Oly Roth sent at 11/15/2018  9:27 AM CST -----  Contact: call pt at [phone]  Calling to request her office notes for annual and lab results to be sent to [facility]  Fax [phone]

## 2018-11-19 NOTE — TELEPHONE ENCOUNTER
2nd attempt to contact patient detailed VM left informing patient flu culture was negative and to return our call if she has any questions.

## 2019-01-20 ENCOUNTER — NURSE TRIAGE (OUTPATIENT)
Dept: ADMINISTRATIVE | Facility: CLINIC | Age: 69
End: 2019-01-20

## 2019-01-20 NOTE — TELEPHONE ENCOUNTER
Reason for Disposition   Requesting regular office appointment    Protocols used: ST INFORMATION ONLY CALL-A-AH

## 2019-01-21 ENCOUNTER — HOSPITAL ENCOUNTER (OUTPATIENT)
Dept: RADIOLOGY | Facility: HOSPITAL | Age: 69
Discharge: HOME | End: 2019-01-21
Attending: INTERNAL MEDICINE
Payer: MEDICARE

## 2019-01-21 ENCOUNTER — OFFICE VISIT (OUTPATIENT)
Dept: INTERNAL MEDICINE | Facility: CLINIC | Age: 69
End: 2019-01-21
Payer: MEDICARE

## 2019-01-21 VITALS
TEMPERATURE: 98 F | HEART RATE: 95 BPM | WEIGHT: 188.69 LBS | SYSTOLIC BLOOD PRESSURE: 118 MMHG | HEIGHT: 67 IN | BODY MASS INDEX: 29.62 KG/M2 | DIASTOLIC BLOOD PRESSURE: 88 MMHG

## 2019-01-21 DIAGNOSIS — S80.12XA CONTUSION OF LEFT LOWER EXTREMITY, INITIAL ENCOUNTER: ICD-10-CM
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
## 2019-01-21 DIAGNOSIS — M79.605 PAIN OF LEFT LOWER EXTREMITY: ICD-10-CM

## 2019-01-21 DIAGNOSIS — S60.221A CONTUSION OF RIGHT PALM, INITIAL ENCOUNTER: ICD-10-CM

## 2019-01-21 PROCEDURE — 99214 OFFICE O/P EST MOD 30 MIN: CPT | Mod: HCNC,S$GLB,, | Performed by: INTERNAL MEDICINE

## 2019-01-21 PROCEDURE — 73590 XR TIBIA FIBULA BILATERAL: ICD-10-PCS | Mod: 26,HCNC,RT, | Performed by: RADIOLOGY

## 2019-01-21 PROCEDURE — 3288F FALL RISK ASSESSMENT DOCD: CPT | Mod: CPTII,HCNC,S$GLB, | Performed by: INTERNAL MEDICINE

## 2019-01-21 PROCEDURE — 1100F PTFALLS ASSESS-DOCD GE2>/YR: CPT | Mod: CPTII,HCNC,S$GLB, | Performed by: INTERNAL MEDICINE

## 2019-01-21 PROCEDURE — 99214 PR OFFICE/OUTPT VISIT, EST, LEVL IV, 30-39 MIN: ICD-10-PCS | Mod: HCNC,S$GLB,, | Performed by: INTERNAL MEDICINE

## 2019-01-21 PROCEDURE — 1100F PR PT FALLS ASSESS DOC 2+ FALLS/FALL W/INJURY/YR: ICD-10-PCS | Mod: CPTII,HCNC,S$GLB, | Performed by: INTERNAL MEDICINE

## 2019-01-21 PROCEDURE — 73590 X-RAY EXAM OF LOWER LEG: CPT | Mod: 50,TC,HCNC,PO

## 2019-01-21 PROCEDURE — 3288F PR FALLS RISK ASSESSMENT DOCUMENTED: ICD-10-PCS | Mod: CPTII,HCNC,S$GLB, | Performed by: INTERNAL MEDICINE

## 2019-01-21 PROCEDURE — 73590 X-RAY EXAM OF LOWER LEG: CPT | Mod: 26,HCNC,RT, | Performed by: RADIOLOGY

## 2019-01-21 PROCEDURE — 73590 X-RAY EXAM OF LOWER LEG: CPT | Mod: 26,59,HCNC,LT | Performed by: RADIOLOGY

## 2019-01-21 PROCEDURE — 99999 PR PBB SHADOW E&M-EST. PATIENT-LVL IV: ICD-10-PCS | Mod: PBBFAC,HCNC,, | Performed by: INTERNAL MEDICINE

## 2019-01-21 PROCEDURE — 99999 PR PBB SHADOW E&M-EST. PATIENT-LVL IV: CPT | Mod: PBBFAC,HCNC,, | Performed by: INTERNAL MEDICINE

## 2019-01-21 NOTE — PROGRESS NOTES
Subjective:       Patient ID: Charu Lopez is a 69 y.o. female.    Chief Complaint: Leg Pain and Fall    Patient new to me presents to the [facility] for evaluation of various pains after having a trip and fall on a sidewalk on the evening of 1/18/19 going to the theater.  She fell on her left knee/leg and landed with her right hand/wrist/palm out stretched.  She was able to walk after getting up without assistance.  She went on to the theater.  The following day she had more pain in the upper part of her left lower leg; not the knee although there is a minor skin abrasion on the left knee.  Her clothing was not torn.  She did put cold on the area the day after.  She decided to come in because the area is more sore today.  Hurts some to stand and walk but she ambulated down the hallway with no problem.  Has been using tylenol with good effect.    She wants to be sure nothing is broken/dislocated.    No head trauma.      Review of Systems   Constitutional: Negative for chills and fever.   Respiratory: Negative.    Cardiovascular: Negative.    Gastrointestinal: Negative.    Musculoskeletal: Negative for gait problem.        Left lower leg pain.   Neurological: Negative for dizziness, light-headedness and headaches.       Objective:      Physical Exam   Constitutional: She is oriented to person, place, and time. She appears well-developed and well-nourished. No distress.   Eyes: EOM are normal. Pupils are equal, round, and reactive to light. No scleral icterus.   Neck: Normal range of motion. Neck supple.   Cardiovascular: Normal rate, regular rhythm, normal heart sounds and intact distal pulses.   Pulmonary/Chest: Effort normal and breath sounds normal. No respiratory distress.   Musculoskeletal:   Ambulates with no problem.    Bruising noted on base of right thumb and lower wrist area.    Minor skin abrasion on left knee.    FROM of left knee; no redness; no swelling.    Tender to direct  palpation just below the left knee in soft tissue on lateral aspect of upper part of lower leg.  No sign of bruising.  Tender to direct palpation of medial aspect at lower tibial area but still no external bruises noted.  No swelling.  Good pulses.  Rico's negative bilaterally.   Neurological: She is alert and oriented to person, place, and time.   Skin:   Bruises as above.   Vitals reviewed.      Assessment:       1. Fall on same level from slipping, tripping and stumbling with subsequent striking against unspecified object, initial encounter    2. Pain of left lower extremity    3. Contusion of right palm, initial encounter    4. Contusion of left lower extremity, initial encounter        Plan:   xrays of lower leg performed and reviewed with patient in the office.  Negative for fracture/dislocation.  1. Use heat on sore areas for now.  2. Tylenol or advil prn pain.  3. No restrictions.  4. Continue all regular meds.  5. Followup with regular PCP as needed.  6. Topical care of skin abrasion on left knee discussed.

## 2019-01-21 NOTE — PATIENT INSTRUCTIONS
1. Use heat on the various sore places at this point.  2. Tylenol or advil/ibuprofen as needed for the pain.  3. Continue any regular meds.  4. Topical care of minor skin abrasion on left knee discussed.

## 2019-04-19 ENCOUNTER — NURSE TRIAGE (OUTPATIENT)
Dept: ADMINISTRATIVE | Facility: CLINIC | Age: 69
End: 2019-04-19

## 2019-04-19 NOTE — TELEPHONE ENCOUNTER
Wed left leg pain. Fell in January and had a contusion to that area and that went away. Same place started hurting again on Wed.    Reason for Disposition   [1] Thigh or calf pain AND [2] only 1 side AND [3] present > 1 hour    Protocols used: LEG PAIN-A-AH

## 2019-04-20 ENCOUNTER — HOSPITAL ENCOUNTER (OUTPATIENT)
Dept: RADIOLOGY | Facility: HOSPITAL | Age: 69
Discharge: HOME | End: 2019-04-20
Attending: FAMILY MEDICINE
Payer: MEDICARE

## 2019-04-20 ENCOUNTER — OFFICE VISIT (OUTPATIENT)
Dept: INTERNAL MEDICINE | Facility: CLINIC | Age: 69
End: 2019-04-20
Payer: MEDICARE

## 2019-04-20 VITALS
DIASTOLIC BLOOD PRESSURE: 60 MMHG | BODY MASS INDEX: 30.62 KG/M2 | RESPIRATION RATE: 18 BRPM | HEART RATE: 86 BPM | SYSTOLIC BLOOD PRESSURE: 158 MMHG | OXYGEN SATURATION: 99 % | HEIGHT: 66 IN | WEIGHT: 190.5 LBS | TEMPERATURE: 98 F
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
## 2019-04-20 DIAGNOSIS — M79.605 PAIN OF LEFT ANTERIOR LOWER EXTREMITY: ICD-10-CM

## 2019-04-20 DIAGNOSIS — M79.605 PAIN OF LEFT ANTERIOR LOWER EXTREMITY: Primary | ICD-10-CM

## 2019-04-20 PROBLEM — R07.9 CHEST PAIN: Status: RESOLVED | Noted: 2017-04-19 | Resolved: 2019-04-20

## 2019-04-20 PROBLEM — R09.89: Status: RESOLVED | Noted: 2017-04-19 | Resolved: 2019-04-20

## 2019-04-20 PROCEDURE — 99999 PR PBB SHADOW E&M-EST. PATIENT-LVL III: ICD-10-PCS | Mod: PBBFAC,HCNC,, | Performed by: FAMILY MEDICINE

## 2019-04-20 PROCEDURE — 73590 XR TIBIA FIBULA 2 VIEW LEFT: ICD-10-PCS | Mod: 26,HCNC,LT, | Performed by: RADIOLOGY

## 2019-04-20 PROCEDURE — 1101F PR PT FALLS ASSESS DOC 0-1 FALLS W/OUT INJ PAST YR: ICD-10-PCS | Mod: HCNC,CPTII,S$GLB, | Performed by: FAMILY MEDICINE

## 2019-04-20 PROCEDURE — 73590 X-RAY EXAM OF LOWER LEG: CPT | Mod: TC,HCNC,PO,LT

## 2019-04-20 PROCEDURE — 99213 PR OFFICE/OUTPT VISIT, EST, LEVL III, 20-29 MIN: ICD-10-PCS | Mod: HCNC,S$GLB,, | Performed by: FAMILY MEDICINE

## 2019-04-20 PROCEDURE — 99999 PR PBB SHADOW E&M-EST. PATIENT-LVL III: CPT | Mod: PBBFAC,HCNC,, | Performed by: FAMILY MEDICINE

## 2019-04-20 PROCEDURE — 99213 OFFICE O/P EST LOW 20 MIN: CPT | Mod: HCNC,S$GLB,, | Performed by: FAMILY MEDICINE

## 2019-04-20 PROCEDURE — 73590 X-RAY EXAM OF LOWER LEG: CPT | Mod: 26,HCNC,LT, | Performed by: RADIOLOGY

## 2019-04-20 PROCEDURE — 1101F PT FALLS ASSESS-DOCD LE1/YR: CPT | Mod: HCNC,CPTII,S$GLB, | Performed by: FAMILY MEDICINE

## 2019-04-20 RX ORDER — DICLOFENAC SODIUM 75 MG/1
75 TABLET, DELAYED RELEASE ORAL 2 TIMES DAILY
Qty: 20 TABLET | Refills: 0 | Status: SHIPPED | OUTPATIENT
Start: 2019-04-20 | End: 2021-10-13

## 2019-04-20 NOTE — PROGRESS NOTES
Subjective:   Patient ID: Charu Lopez is a 69 y.o. female.    Chief Complaint: Leg Pain      HPI  70 yo female with fall on left leg about 3 mo ago and still having ant shin pain. No edema inc no calf edema or pain and no popliteal fossa pain or tenderness. No ho dvt.    Patient queried and denies any further complaints.    ALLERGIES AND MEDICATIONS: updated and reviewed.  Review of patient's allergies indicates:   Allergen Reactions    Benadryl [diphenhydramine hcl]     Tetracyclines      Stomach upset         Current Outpatient Medications:     diclofenac (VOLTAREN) 75 MG EC tablet, Take 1 tablet (75 mg total) by mouth 2 (two) times daily. With food and water for 3-10 days, Disp: 20 tablet, Rfl: 0    econazole nitrate 1 % cream, AAA bid, Disp: 85 g, Rfl: 2    ipratropium (ATROVENT) 0.03 % nasal spray, 2 sprays by Nasal route 2 (two) times daily., Disp: 30 mL, Rfl: 1    multivitamin (ONE DAILY MULTIVITAMIN) per tablet, Take 1 tablet by mouth once daily., Disp: , Rfl:     ranitidine (ZANTAC) 150 MG tablet, Take 1 tablet (150 mg total) by mouth 2 (two) times daily. X 5-10 days, Disp: 30 tablet, Rfl: 0    Review of Systems   Constitutional: Negative for activity change, appetite change, chills, diaphoresis, fatigue, fever and unexpected weight change.   HENT: Negative for congestion, ear discharge, ear pain, facial swelling, hearing loss, nosebleeds, postnasal drip, rhinorrhea, sinus pressure, sneezing, sore throat, tinnitus, trouble swallowing and voice change.    Eyes: Negative for photophobia, pain, discharge, redness, itching and visual disturbance.   Respiratory: Negative for cough, chest tightness, shortness of breath and wheezing.    Cardiovascular: Negative for chest pain, palpitations and leg swelling.   Gastrointestinal: Negative for abdominal distention, abdominal pain, anal bleeding, blood in stool, constipation, diarrhea, nausea, rectal pain and vomiting.   Endocrine: Negative for  "cold intolerance, heat intolerance, polydipsia, polyphagia and polyuria.   Genitourinary: Negative for difficulty urinating, dysuria and flank pain.   Musculoskeletal: Positive for arthralgias. Negative for back pain, joint swelling, myalgias and neck pain.   Skin: Negative for rash.   Neurological: Negative for dizziness, tremors, seizures, syncope, speech difficulty, weakness, light-headedness, numbness and headaches.   Psychiatric/Behavioral: Negative for behavioral problems, confusion, decreased concentration, dysphoric mood, sleep disturbance and suicidal ideas. The patient is not nervous/anxious and is not hyperactive.        Objective:     Vitals:    04/20/19 0826   BP: (!) 158/60   Pulse: 86   Resp: 18   Temp: 98.1 °F (36.7 °C)   TempSrc: Oral   SpO2: 99%   Weight: 86.4 kg (190 lb 7.6 oz)   Height: 5' 6" (1.676 m)   PainSc:   2   PainLoc: Leg     Body mass index is 30.74 kg/m².    Physical Exam   Constitutional: She is oriented to person, place, and time. She appears well-developed and well-nourished.   HENT:   Head: Normocephalic and atraumatic.   Musculoskeletal:        Left knee: She exhibits swelling and bony tenderness. She exhibits no effusion, no ecchymosis, no deformity, no laceration, no erythema, normal alignment, no LCL laxity, normal meniscus and no MCL laxity. Tenderness found. Lateral joint line tenderness noted.   Neurological: She is alert and oriented to person, place, and time.   Skin: Skin is warm and dry.   Psychiatric: She has a normal mood and affect. Her behavior is normal.   Nursing note and vitals reviewed.      Assessment and Plan:   Charu was seen today for leg pain.    Diagnoses and all orders for this visit:    Pain of left anterior lower extremity  -     X-Ray Tibia Fibula 2 View Left; Future    Fall, initial encounter    Other orders  -     diclofenac (VOLTAREN) 75 MG EC tablet; Take 1 tablet (75 mg total) by mouth 2 (two) times daily. With food and water for 3-10 days  -   "   ranitidine (ZANTAC) 150 MG tablet; Take 1 tablet (150 mg total) by mouth 2 (two) times daily. X 5-10 days    if pain or edema worsens or begins in calf, go to Er immediately; pt educ about poss dvt then. Seems very unlikely given exam and hx today though    .    Follow up in about 2 weeks (around 5/4/2019).    THIS NOTE WILL BE SHARED WITH THE PATIENT.

## 2019-04-25 ENCOUNTER — TELEPHONE (OUTPATIENT)
Dept: FAMILY MEDICINE | Facility: CLINIC | Age: 69
End: 2019-04-25

## 2019-04-25 NOTE — TELEPHONE ENCOUNTER
Per the doctor note she doesn't need one unless her leg swells up gets red and pain gets worse, If this happens she is advised to go to ED for evaluation

## 2019-04-26 ENCOUNTER — PATIENT MESSAGE (OUTPATIENT)
Dept: FAMILY MEDICINE | Facility: CLINIC | Age: 69
End: 2019-04-26

## 2019-05-29 ENCOUNTER — PES CALL (OUTPATIENT)
Dept: ADMINISTRATIVE | Facility: CLINIC | Age: 69
End: 2019-05-29

## 2019-06-18 ENCOUNTER — PES CALL (OUTPATIENT)
Dept: ADMINISTRATIVE | Facility: CLINIC | Age: 69
End: 2019-06-18

## 2019-08-25 ENCOUNTER — PATIENT OUTREACH (OUTPATIENT)
Dept: ADMINISTRATIVE | Facility: HOSPITAL | Age: 69
End: 2019-08-25

## 2019-08-25 DIAGNOSIS — Z12.11 SCREENING FOR COLON CANCER: Primary | ICD-10-CM

## 2019-08-26 ENCOUNTER — OFFICE VISIT (OUTPATIENT)
Dept: URGENT CARE | Facility: CLINIC | Age: 69
End: 2019-08-26
Payer: MEDICARE

## 2019-08-26 VITALS
HEIGHT: 66 IN | SYSTOLIC BLOOD PRESSURE: 143 MMHG | TEMPERATURE: 97 F | WEIGHT: 190 LBS | RESPIRATION RATE: 10 BRPM | HEART RATE: 86 BPM | OXYGEN SATURATION: 97 % | DIASTOLIC BLOOD PRESSURE: 79 MMHG | BODY MASS INDEX: 30.53 KG/M2

## 2019-08-26 DIAGNOSIS — L01.00 IMPETIGO: Primary | ICD-10-CM

## 2019-08-26 PROCEDURE — 99214 PR OFFICE/OUTPT VISIT, EST, LEVL IV, 30-39 MIN: ICD-10-PCS | Mod: S$GLB,,, | Performed by: NURSE PRACTITIONER

## 2019-08-26 PROCEDURE — 99214 OFFICE O/P EST MOD 30 MIN: CPT | Mod: S$GLB,,, | Performed by: NURSE PRACTITIONER

## 2019-08-26 PROCEDURE — 1101F PR PT FALLS ASSESS DOC 0-1 FALLS W/OUT INJ PAST YR: ICD-10-PCS | Mod: CPTII,S$GLB,, | Performed by: NURSE PRACTITIONER

## 2019-08-26 PROCEDURE — 1101F PT FALLS ASSESS-DOCD LE1/YR: CPT | Mod: CPTII,S$GLB,, | Performed by: NURSE PRACTITIONER

## 2019-08-26 RX ORDER — MUPIROCIN 20 MG/G
OINTMENT TOPICAL 3 TIMES DAILY
Qty: 2 G | Refills: 0 | Status: SHIPPED | OUTPATIENT
Start: 2019-08-26 | End: 2021-10-13

## 2019-08-26 NOTE — PROGRESS NOTES
Fit Kit 2018 results uploaded to .  Fit Kit order placed per [facility] for patient to complete screening annually as recommended for 2019.

## 2019-08-27 NOTE — PROGRESS NOTES
"Subjective:       Patient ID: Charu Lopez is a 69 y.o. female.    Vitals:  height is 5' 6" (1.676 m) and weight is 86.2 kg (190 lb). Her oral temperature is 97.1 °F (36.2 °C). Her blood pressure is 143/79 (abnormal) and her pulse is 86. Her respiration is 10 and oxygen saturation is 97%.     Chief Complaint: Rash (under nose )    Pt c/o rash under nose, itchy, and blisters , x 3 days    pt request written Rx     Rash   This is a new problem. The current episode started in the past 7 days. The problem has been gradually worsening since onset. The affected locations include the face and lips. The rash is characterized by blistering, itchiness and redness. She was exposed to nothing. Pertinent negatives include no cough, fever or sore throat. Past treatments include antibiotic cream.       Constitution: Negative for chills and fever.   HENT: Negative for facial swelling and sore throat.    Neck: Negative for painful lymph nodes.   Eyes: Negative for eye itching and eyelid swelling.   Respiratory: Negative for cough.    Musculoskeletal: Negative for joint pain and joint swelling.   Skin: Positive for rash (under nose) and hives. Negative for color change, pale, wound, abrasion, laceration, lesion, skin thickening/induration, puncture wound, bruising, abscess and avulsion.   Allergic/Immunologic: Positive for hives and itching. Negative for environmental allergies and immunocompromised state.   Hematologic/Lymphatic: Negative for swollen lymph nodes.       Objective:      Physical Exam   Constitutional: She is oriented to person, place, and time. She appears well-developed and well-nourished.  Non-toxic appearance. She does not have a sickly appearance. She does not appear ill. No distress.   HENT:   Head: Normocephalic and atraumatic.   Right Ear: External ear normal.   Left Ear: External ear normal.   Nose: Nose normal. No rhinorrhea, nose lacerations or sinus tenderness.       Mouth/Throat: Uvula is " "midline, oropharynx is clear and moist and mucous membranes are normal. No oropharyngeal exudate. No tonsillar exudate.   Eyes: Conjunctivae are normal.   Neck: Trachea normal.   Cardiovascular: Normal rate, regular rhythm, normal heart sounds, intact distal pulses and normal pulses.   Pulmonary/Chest: Effort normal and breath sounds normal. No accessory muscle usage or stridor. No apnea, no tachypnea and no bradypnea. No respiratory distress. She has no wheezes. She has no rales.   Abdominal: Normal appearance.   Musculoskeletal: Normal range of motion.   Neurological: She is alert and oriented to person, place, and time.   Skin: Skin is warm, dry and intact. Capillary refill takes less than 2 seconds. Rash noted.        Psychiatric: She has a normal mood and affect. Her behavior is normal.   Nursing note and vitals reviewed.      Assessment:       1. Impetigo        Plan:       Patient Instructions     Impetigo  Impetigo is a common bacterial infection of the skin that can appear on many parts of the body. It can happen to anyone, of any age, but is more common in children. For this reason, it used to be called "school sores."  Causes  Its normal to get scrapes on your body from activity or from scratching your skin. The skin normally has bacteria on it. Sometimes an impetigo infection can start on healthy skin. But it usually starts when there is an injury to the skin, or break in the skin. Although nothing usually happens, the bacteria normally on the skin can cause infection. This is the most common way people get impetigo.  Impetigo is very contagious. So once there is an infection, it needs to be treated so it doesn't get worse, spread to other areas, or to other people. Impetigo can easily be passed to other family members, friends, schoolmates, or co-workers, through scratching, rubbing, or touching an infected area. Common causes include:  · After a cold  · Bites  · From another infected person  · Injury " to skin  · Insect bites  · Other skin problems that are infected, such as eczema  · Scratches  Symptoms  There is often a skin injury like a scratch, scrape, or insect bite that may have gone unnoticed or been ignored before the infection began. Symptoms of impetigo include:  · Red, inflamed area or rash  · One or many red bumps  · Bumps that turn into blisters filled with yellow fluid or pus  · Blisters break or leak causing honey-colored crusting or scabbing over the area  · Skin sores that spread to other surrounding areas  Home care  The following guidelines will help you care for your infection at home.  Wound care  · Trim fingernails and cover sores with an adhesive bandage, if needed, to prevent scratching. Picking at the sores may leave a scar.  · If the infection is on or around your lips, don't lick or chew on the sores. This will make the infection worse.  · If a bandage or dressing is used, you can put a nonstick dressing over it.  · Wash your hands and your childs hands often. This will avoid spreading the infection to other parts of the body and to other people. Do not share the infected persons washcloths, towels, pillows, sheets, or clothes with others. Wash these items in hot water before using again.  · Clean the area several times a day. You dont want to scrub the area. The best way to do this is to soak the sores in warm, soapy water until they get soft enough to be wiped away. This will help remove the crust that forms from the dried liquid. In areas that you cant soak, like the mouth or face, you can put a clean, warm washcloth over the infected are for 5 to 10 minutes at a time, until the scabs soften enough to remove.  Medicines  · You can use over-the-counter medicine as directed based on age and weight for pain, fever, fussiness, or discomfort, unless another medicine was prescribed. In infants ages 6 months and older, you may use ibuprofen as well as acetaminophen. You can alternate  them, or use both together. They work differently and are a different class of medicines, so taking them together is not an overdose. If you or your child has chronic liver or kidney disease or ever had a stomach ulcer or gastrointestinal bleeding, talk with your healthcare provider before using these medicines. Also talk with your healthcare provider if your child is taking blood-thinner medicines.  · Do not give aspirin to your child. Aspirin should never be used in children ages 18 and younger who is ill with a fever. It may cause severe disease or death.   · Impetigo can often be cured with topical creams. Apply these as directed by your healthcare provider.  · If you were given oral antibiotics, take them until they are used up. It is important to finish the antibiotics even if the wound looks better to make sure the infection has cleared.  Follow-up care  Follow up with your healthcare provider if the sores continue to spread after 3 days of treatment. It will take about 7 to 10 days to heal completely.  Your child should stay out of school until completing 2 full days of antibiotic treatment.  When to seek medical advice  Call your healthcare provider right away if any of the following occur:  · Fever of 100.4°F (38°C) or higher, or as directed  · Increased amounts of fluid or pus coming from the sores  · Increasing number of sores or spreading areas of redness after 2 days of treatment with antibiotics  · Increasing swelling or pain  · Loss of appetite or vomiting  · Unusual drowsiness, weakness, or change in behavior  Date Last Reviewed: 8/1/2016  © 0566-1520 [employer]. [address] [place] [address]. All rights reserved. This information is not intended as a substitute for professional medical care. Always follow your healthcare professional's instructions.          Impetigo  -     mupirocin (BACTROBAN) 2 % ointment; Apply topically 3 (three) times daily.  Dispense: 2 g; Refill:  0

## 2019-08-27 NOTE — PATIENT INSTRUCTIONS
"  Impetigo  Impetigo is a common bacterial infection of the skin that can appear on many parts of the body. It can happen to anyone, of any age, but is more common in children. For this reason, it used to be called "school sores."  Causes  Its normal to get scrapes on your body from activity or from scratching your skin. The skin normally has bacteria on it. Sometimes an impetigo infection can start on healthy skin. But it usually starts when there is an injury to the skin, or break in the skin. Although nothing usually happens, the bacteria normally on the skin can cause infection. This is the most common way people get impetigo.  Impetigo is very contagious. So once there is an infection, it needs to be treated so it doesn't get worse, spread to other areas, or to other people. Impetigo can easily be passed to other family members, friends, schoolmates, or co-workers, through scratching, rubbing, or touching an infected area. Common causes include:  · After a cold  · Bites  · From another infected person  · Injury to skin  · Insect bites  · Other skin problems that are infected, such as eczema  · Scratches  Symptoms  There is often a skin injury like a scratch, scrape, or insect bite that may have gone unnoticed or been ignored before the infection began. Symptoms of impetigo include:  · Red, inflamed area or rash  · One or many red bumps  · Bumps that turn into blisters filled with yellow fluid or pus  · Blisters break or leak causing honey-colored crusting or scabbing over the area  · Skin sores that spread to other surrounding areas  Home care  The following guidelines will help you care for your infection at home.  Wound care  · Trim fingernails and cover sores with an adhesive bandage, if needed, to prevent scratching. Picking at the sores may leave a scar.  · If the infection is on or around your lips, don't lick or chew on the sores. This will make the infection worse.  · If a bandage or dressing is used, " you can put a nonstick dressing over it.  · Wash your hands and your childs hands often. This will avoid spreading the infection to other parts of the body and to other people. Do not share the infected persons washcloths, towels, pillows, sheets, or clothes with others. Wash these items in hot water before using again.  · Clean the area several times a day. You dont want to scrub the area. The best way to do this is to soak the sores in warm, soapy water until they get soft enough to be wiped away. This will help remove the crust that forms from the dried liquid. In areas that you cant soak, like the mouth or face, you can put a clean, warm washcloth over the infected are for 5 to 10 minutes at a time, until the scabs soften enough to remove.  Medicines  · You can use over-the-counter medicine as directed based on age and weight for pain, fever, fussiness, or discomfort, unless another medicine was prescribed. In infants ages 6 months and older, you may use ibuprofen as well as acetaminophen. You can alternate them, or use both together. They work differently and are a different class of medicines, so taking them together is not an overdose. If you or your child has chronic liver or kidney disease or ever had a stomach ulcer or gastrointestinal bleeding, talk with your healthcare provider before using these medicines. Also talk with your healthcare provider if your child is taking blood-thinner medicines.  · Do not give aspirin to your child. Aspirin should never be used in children ages 18 and younger who is ill with a fever. It may cause severe disease or death.   · Impetigo can often be cured with topical creams. Apply these as directed by your healthcare provider.  · If you were given oral antibiotics, take them until they are used up. It is important to finish the antibiotics even if the wound looks better to make sure the infection has cleared.  Follow-up care  Follow up with your healthcare provider if  the sores continue to spread after 3 days of treatment. It will take about 7 to 10 days to heal completely.  Your child should stay out of school until completing 2 full days of antibiotic treatment.  When to seek medical advice  Call your healthcare provider right away if any of the following occur:  · Fever of 100.4°F (38°C) or higher, or as directed  · Increased amounts of fluid or pus coming from the sores  · Increasing number of sores or spreading areas of redness after 2 days of treatment with antibiotics  · Increasing swelling or pain  · Loss of appetite or vomiting  · Unusual drowsiness, weakness, or change in behavior  Date Last Reviewed: 8/1/2016 © 2000-2017 [employer]. [address] [place] [address], [place] [address]. All rights reserved. This information is not intended as a substitute for professional medical care. Always follow your healthcare professional's instructions.

## 2019-08-28 ENCOUNTER — NURSE TRIAGE (OUTPATIENT)
Dept: ADMINISTRATIVE | Facility: CLINIC | Age: 69
End: 2019-08-28

## 2019-08-28 NOTE — TELEPHONE ENCOUNTER
Was seen at urgent care on 8/6 and thought she had Impetigo and was prescribed an ointment. Rash has spread and now moved to lower lip. Was only above upper lip previously. Was not hurting but now is a little painful.     Reason for Disposition   [1] Red streak or bright red area around 1 sore AND [2] no fever    Additional Information   Negative: Looks like ringworm   Negative: Doesn't match the SYMPTOMS of impetigo   Negative: Impetigo is part of a wound infection   Negative: [1] Red streak runs from impetigo AND [2] fever   Negative: [1] Red spreading area around 1 sore AND [2] fever    Protocols used: IMPETIGO (INFECTED SORE)-A-

## 2019-08-29 ENCOUNTER — OFFICE VISIT (OUTPATIENT)
Dept: INTERNAL MEDICINE | Facility: CLINIC | Age: 69
End: 2019-08-29
Payer: MEDICARE

## 2019-08-29 ENCOUNTER — PATIENT MESSAGE (OUTPATIENT)
Dept: INTERNAL MEDICINE | Facility: CLINIC | Age: 69
End: 2019-08-29

## 2019-08-29 VITALS
RESPIRATION RATE: 18 BRPM | WEIGHT: 193.13 LBS | TEMPERATURE: 98 F | HEIGHT: 66 IN | DIASTOLIC BLOOD PRESSURE: 72 MMHG | BODY MASS INDEX: 31.04 KG/M2 | SYSTOLIC BLOOD PRESSURE: 124 MMHG | HEART RATE: 88 BPM

## 2019-08-29 DIAGNOSIS — L01.00 IMPETIGO: Primary | ICD-10-CM

## 2019-08-29 PROCEDURE — 3288F PR FALLS RISK ASSESSMENT DOCUMENTED: ICD-10-PCS | Mod: HCNC,CPTII,S$GLB, | Performed by: INTERNAL MEDICINE

## 2019-08-29 PROCEDURE — 99999 PR PBB SHADOW E&M-EST. PATIENT-LVL III: ICD-10-PCS | Mod: PBBFAC,HCNC,, | Performed by: INTERNAL MEDICINE

## 2019-08-29 PROCEDURE — 1100F PR PT FALLS ASSESS DOC 2+ FALLS/FALL W/INJURY/YR: ICD-10-PCS | Mod: HCNC,CPTII,S$GLB, | Performed by: INTERNAL MEDICINE

## 2019-08-29 PROCEDURE — 1100F PTFALLS ASSESS-DOCD GE2>/YR: CPT | Mod: HCNC,CPTII,S$GLB, | Performed by: INTERNAL MEDICINE

## 2019-08-29 PROCEDURE — 3288F FALL RISK ASSESSMENT DOCD: CPT | Mod: HCNC,CPTII,S$GLB, | Performed by: INTERNAL MEDICINE

## 2019-08-29 PROCEDURE — 99213 OFFICE O/P EST LOW 20 MIN: CPT | Mod: HCNC,S$GLB,, | Performed by: INTERNAL MEDICINE

## 2019-08-29 PROCEDURE — 99213 PR OFFICE/OUTPT VISIT, EST, LEVL III, 20-29 MIN: ICD-10-PCS | Mod: HCNC,S$GLB,, | Performed by: INTERNAL MEDICINE

## 2019-08-29 PROCEDURE — 99999 PR PBB SHADOW E&M-EST. PATIENT-LVL III: CPT | Mod: PBBFAC,HCNC,, | Performed by: INTERNAL MEDICINE

## 2019-08-29 RX ORDER — AMOXICILLIN AND CLAVULANATE POTASSIUM 875; 125 MG/1; MG/1
1 TABLET, FILM COATED ORAL 2 TIMES DAILY
Qty: 20 TABLET | Refills: 0 | Status: SHIPPED | OUTPATIENT
Start: 2019-08-29 | End: 2019-09-08

## 2019-08-29 NOTE — PROGRESS NOTES
Subjective:       Patient ID: Charu Lopez is a 69 y.o. female.    Chief Complaint: Impetigo (under nose & spreading)    HPI   Pt here for evaluation of 6 days of worsening impetigo to her upper lip area. Minimal relief with OTC Abx ointments.   Review of Systems   Constitutional: Negative for activity change, appetite change, chills, diaphoresis, fatigue, fever and unexpected weight change.   HENT: Negative for postnasal drip, rhinorrhea, sinus pressure, sneezing, sore throat, trouble swallowing and voice change.    Respiratory: Negative for cough, shortness of breath and wheezing.    Cardiovascular: Negative for chest pain, palpitations and leg swelling.   Gastrointestinal: Negative for abdominal pain, blood in stool, constipation, diarrhea, nausea and vomiting.   Genitourinary: Negative for dysuria.   Musculoskeletal: Negative for arthralgias and myalgias.   Skin: Positive for rash. Negative for wound.   Allergic/Immunologic: Negative for environmental allergies and food allergies.   Hematological: Negative for adenopathy. Does not bruise/bleed easily.       Objective:      Physical Exam   Constitutional: She is oriented to person, place, and time. She appears well-developed and well-nourished. No distress.   HENT:   Head: Normocephalic and atraumatic.       Right Ear: External ear normal.   Left Ear: External ear normal.   Nose: Nose normal.   Mouth/Throat: Oropharynx is clear and moist. No oropharyngeal exudate.   Mild impetigo   Eyes: Pupils are equal, round, and reactive to light. Conjunctivae and EOM are normal. Right eye exhibits no discharge. Left eye exhibits no discharge. No scleral icterus.   Neck: Neck supple. No JVD present.   Cardiovascular: Normal rate, regular rhythm, normal heart sounds and intact distal pulses.   Pulmonary/Chest: Effort normal and breath sounds normal. No respiratory distress. She has no wheezes. She has no rales.   Musculoskeletal: She exhibits no edema.    Lymphadenopathy:     She has no cervical adenopathy.   Neurological: She is alert and oriented to person, place, and time.   Skin: Skin is warm and dry. No rash noted. She is not diaphoretic. No pallor.       Assessment:       1. Impetigo        Plan:    1. Rx Augmentin BID x 10 days and continue with topical Abx ointment

## 2019-08-31 ENCOUNTER — PATIENT MESSAGE (OUTPATIENT)
Dept: INTERNAL MEDICINE | Facility: CLINIC | Age: 69
End: 2019-08-31

## 2019-09-08 ENCOUNTER — PATIENT MESSAGE (OUTPATIENT)
Dept: INTERNAL MEDICINE | Facility: CLINIC | Age: 69
End: 2019-09-08

## 2019-09-13 ENCOUNTER — LAB VISIT (OUTPATIENT)
Dept: LAB | Facility: HOSPITAL | Age: 69
End: 2019-09-13
Attending: INTERNAL MEDICINE
Payer: MEDICARE

## 2019-09-13 ENCOUNTER — OFFICE VISIT (OUTPATIENT)
Dept: INTERNAL MEDICINE | Facility: CLINIC | Age: 69
End: 2019-09-13
Payer: MEDICARE

## 2019-09-13 VITALS
BODY MASS INDEX: 31.15 KG/M2 | HEIGHT: 66 IN | WEIGHT: 193.81 LBS | TEMPERATURE: 98 F | SYSTOLIC BLOOD PRESSURE: 108 MMHG | HEART RATE: 88 BPM | DIASTOLIC BLOOD PRESSURE: 72 MMHG | RESPIRATION RATE: 18 BRPM

## 2019-09-13 DIAGNOSIS — K13.0 LIP LESION: ICD-10-CM

## 2019-09-13 DIAGNOSIS — K13.0 LIP LESION: Primary | ICD-10-CM

## 2019-09-13 PROCEDURE — 99213 OFFICE O/P EST LOW 20 MIN: CPT | Mod: HCNC,S$GLB,, | Performed by: INTERNAL MEDICINE

## 2019-09-13 PROCEDURE — 1101F PR PT FALLS ASSESS DOC 0-1 FALLS W/OUT INJ PAST YR: ICD-10-PCS | Mod: HCNC,CPTII,S$GLB, | Performed by: INTERNAL MEDICINE

## 2019-09-13 PROCEDURE — 99999 PR PBB SHADOW E&M-EST. PATIENT-LVL III: ICD-10-PCS | Mod: PBBFAC,HCNC,, | Performed by: INTERNAL MEDICINE

## 2019-09-13 PROCEDURE — 99213 PR OFFICE/OUTPT VISIT, EST, LEVL III, 20-29 MIN: ICD-10-PCS | Mod: HCNC,S$GLB,, | Performed by: INTERNAL MEDICINE

## 2019-09-13 PROCEDURE — 99999 PR PBB SHADOW E&M-EST. PATIENT-LVL III: CPT | Mod: PBBFAC,HCNC,, | Performed by: INTERNAL MEDICINE

## 2019-09-13 PROCEDURE — 1101F PT FALLS ASSESS-DOCD LE1/YR: CPT | Mod: HCNC,CPTII,S$GLB, | Performed by: INTERNAL MEDICINE

## 2019-09-13 PROCEDURE — 86696 HERPES SIMPLEX TYPE 2 TEST: CPT | Mod: HCNC

## 2019-09-13 PROCEDURE — 86694 HERPES SIMPLEX NES ANTBDY: CPT | Mod: HCNC

## 2019-09-13 PROCEDURE — 36415 COLL VENOUS BLD VENIPUNCTURE: CPT | Mod: HCNC,PO

## 2019-09-13 NOTE — PROGRESS NOTES
Subjective:       Patient ID: Charu Lopez is a 69 y.o. female.    Chief Complaint: Follow-up (impetigo)    HPI   Pt with suspected impetigo of her upper lip is here for f/u. She as treated with a 10 day course of Augmentin which did not completely resolve her symptoms/rash.   Review of Systems   Constitutional: Negative for activity change, appetite change, chills, diaphoresis, fatigue, fever and unexpected weight change.   HENT: Negative for postnasal drip, rhinorrhea, sinus pressure, sneezing, sore throat, trouble swallowing and voice change.    Respiratory: Negative for cough, shortness of breath and wheezing.    Cardiovascular: Negative for chest pain, palpitations and leg swelling.   Gastrointestinal: Negative for abdominal pain, blood in stool, constipation, diarrhea, nausea and vomiting.   Genitourinary: Negative for dysuria.   Musculoskeletal: Negative for arthralgias and myalgias.   Skin: Positive for rash. Negative for wound.   Allergic/Immunologic: Negative for environmental allergies and food allergies.   Hematological: Negative for adenopathy. Does not bruise/bleed easily.       Objective:      Physical Exam   Constitutional: She is oriented to person, place, and time. She appears well-developed and well-nourished. No distress.   HENT:   Head: Normocephalic and atraumatic.   Mouth/Throat:       Eyes: Pupils are equal, round, and reactive to light. Conjunctivae and EOM are normal. Right eye exhibits no discharge. Left eye exhibits no discharge. No scleral icterus.   Neck: Neck supple. No JVD present.   Cardiovascular: Normal rate, regular rhythm, normal heart sounds and intact distal pulses.   Pulmonary/Chest: Effort normal and breath sounds normal. No respiratory distress. She has no wheezes. She has no rales.   Musculoskeletal: She exhibits no edema.   Lymphadenopathy:     She has no cervical adenopathy.   Neurological: She is alert and oriented to person, place, and time.   Skin:  Skin is warm and dry. No rash noted. She is not diaphoretic. No pallor.       Assessment:       1. Lip lesion        Plan:    1. Test for HSV

## 2019-09-15 LAB — HSV AB, IGM BY EIA: NEGATIVE

## 2019-09-16 ENCOUNTER — PATIENT MESSAGE (OUTPATIENT)
Dept: INTERNAL MEDICINE | Facility: CLINIC | Age: 69
End: 2019-09-16

## 2019-09-18 LAB
HSV1 IGG SERPL QL IA: NEGATIVE
HSV2 IGG SERPL QL IA: NEGATIVE

## 2019-09-19 ENCOUNTER — PATIENT MESSAGE (OUTPATIENT)
Dept: INTERNAL MEDICINE | Facility: CLINIC | Age: 69
End: 2019-09-19

## 2019-09-24 ENCOUNTER — PES CALL (OUTPATIENT)
Dept: ADMINISTRATIVE | Facility: CLINIC | Age: 69
End: 2019-09-24

## 2019-09-25 ENCOUNTER — PATIENT MESSAGE (OUTPATIENT)
Dept: INTERNAL MEDICINE | Facility: CLINIC | Age: 69
End: 2019-09-25

## 2019-09-25 DIAGNOSIS — R21 RASH OF FACE: Primary | ICD-10-CM

## 2019-09-26 ENCOUNTER — PATIENT MESSAGE (OUTPATIENT)
Dept: INTERNAL MEDICINE | Facility: CLINIC | Age: 69
End: 2019-09-26

## 2019-10-01 ENCOUNTER — PATIENT OUTREACH (OUTPATIENT)
Dept: ADMINISTRATIVE | Facility: OTHER | Age: 69
End: 2019-10-01

## 2019-10-02 ENCOUNTER — TELEPHONE (OUTPATIENT)
Dept: INTERNAL MEDICINE | Facility: CLINIC | Age: 69
End: 2019-10-02

## 2019-10-02 DIAGNOSIS — Z12.31 ENCOUNTER FOR SCREENING MAMMOGRAM FOR BREAST CANCER: Primary | ICD-10-CM

## 2019-10-02 NOTE — TELEPHONE ENCOUNTER
----- Message from Gabe Lyman sent at 10/2/2019  8:06 AM CDT -----  Contact: Self [phone]  Caller is requesting to schedule their annual screening mammogram appointment. Order is not listed in Epic.  Please enter order and contact patient to schedule.  Where would they like the mammogram performed?:  [facility]  Would the patient rather receive a phone call back or a response via [facility]?:   Call back  Additional information:   [address], [place] [address] phone: [phone] fax: [phone]

## 2019-10-03 ENCOUNTER — TELEPHONE (OUTPATIENT)
Dept: INTERNAL MEDICINE | Facility: CLINIC | Age: 69
End: 2019-10-03

## 2019-10-03 DIAGNOSIS — Z12.31 VISIT FOR SCREENING MAMMOGRAM: Primary | ICD-10-CM

## 2019-10-03 NOTE — TELEPHONE ENCOUNTER
----- Message from Zari Martínez sent at 10/3/2019 11:54 AM CDT -----  Contact: [phone]  Patient is requesting a call from the office in regards to having a mammogram done at [facility].  Please advise, thanks

## 2019-10-04 ENCOUNTER — OFFICE VISIT (OUTPATIENT)
Dept: OPTOMETRY | Facility: CLINIC | Age: 69
End: 2019-10-04
Payer: COMMERCIAL

## 2019-10-04 DIAGNOSIS — H25.13 NUCLEAR SCLEROSIS, BILATERAL: Primary | ICD-10-CM

## 2019-10-04 DIAGNOSIS — Z13.5 GLAUCOMA SCREENING: ICD-10-CM

## 2019-10-04 DIAGNOSIS — H52.4 PRESBYOPIA: ICD-10-CM

## 2019-10-04 PROCEDURE — 92015 DETERMINE REFRACTIVE STATE: CPT | Mod: S$GLB,,, | Performed by: OPTOMETRIST

## 2019-10-04 PROCEDURE — 99499 RISK ADDL DX/OHS AUDIT: ICD-10-PCS | Mod: S$GLB,,, | Performed by: OPTOMETRIST

## 2019-10-04 PROCEDURE — 92014 PR EYE EXAM, EST PATIENT,COMPREHESV: ICD-10-PCS | Mod: S$GLB,,, | Performed by: OPTOMETRIST

## 2019-10-04 PROCEDURE — 99999 PR PBB SHADOW E&M-EST. PATIENT-LVL III: ICD-10-PCS | Mod: PBBFAC,,, | Performed by: OPTOMETRIST

## 2019-10-04 PROCEDURE — 92015 PR REFRACTION: ICD-10-PCS | Mod: S$GLB,,, | Performed by: OPTOMETRIST

## 2019-10-04 PROCEDURE — 99499 UNLISTED E&M SERVICE: CPT | Mod: S$GLB,,, | Performed by: OPTOMETRIST

## 2019-10-04 PROCEDURE — 99999 PR PBB SHADOW E&M-EST. PATIENT-LVL III: CPT | Mod: PBBFAC,,, | Performed by: OPTOMETRIST

## 2019-10-04 PROCEDURE — 92014 COMPRE OPH EXAM EST PT 1/>: CPT | Mod: S$GLB,,, | Performed by: OPTOMETRIST

## 2019-10-04 NOTE — PROGRESS NOTES
HPI     DLS; 6/27/18 elumelu  Pt states no Va problems states she had mono VA and have one RX for dist   and one Rx for reading.   Occ. Floaters, no flashes  AT gtts     Last edited by Cate Talavera MA on 10/4/2019  2:05 PM. (History)        ROS     Negative for: Constitutional, Gastrointestinal, Neurological, Skin,   Genitourinary, Musculoskeletal, HENT, Endocrine, Cardiovascular, Eyes,   Respiratory, Psychiatric, Allergic/Imm, Heme/Lymph    Last edited by Sahil Weaver, SATURNINO on 10/4/2019  2:20 PM. (History)        Assessment /Plan     For exam results, see Encounter Report.    Nuclear sclerosis, bilateral    Glaucoma screening    Presbyopia      1. Small cats--pt happy w spex (has separate dist/near spex)  2. PVD hx/Vitreous floaters--discussed Signs/Symptoms of Retinal Detachment.  Pt to rtc immediately if increased Floaters/Flashes occur    PLAN:    rtc 1 yr

## 2019-10-07 ENCOUNTER — PATIENT MESSAGE (OUTPATIENT)
Dept: INTERNAL MEDICINE | Facility: CLINIC | Age: 69
End: 2019-10-07

## 2019-10-08 ENCOUNTER — PATIENT MESSAGE (OUTPATIENT)
Dept: INTERNAL MEDICINE | Facility: CLINIC | Age: 69
End: 2019-10-08

## 2019-10-09 ENCOUNTER — PATIENT MESSAGE (OUTPATIENT)
Dept: INTERNAL MEDICINE | Facility: CLINIC | Age: 69
End: 2019-10-09

## 2019-10-29 ENCOUNTER — TELEPHONE (OUTPATIENT)
Dept: INTERNAL MEDICINE | Facility: CLINIC | Age: 69
End: 2019-10-29

## 2019-10-29 ENCOUNTER — NURSE TRIAGE (OUTPATIENT)
Dept: ADMINISTRATIVE | Facility: CLINIC | Age: 69
End: 2019-10-29

## 2019-10-29 ENCOUNTER — LAB VISIT (OUTPATIENT)
Dept: LAB | Facility: HOSPITAL | Age: 69
End: 2019-10-29
Attending: FAMILY MEDICINE
Payer: MEDICARE

## 2019-10-29 DIAGNOSIS — Z12.11 SCREENING FOR COLON CANCER: ICD-10-CM

## 2019-10-29 PROCEDURE — 82274 ASSAY TEST FOR BLOOD FECAL: CPT | Mod: HCNC

## 2019-10-29 NOTE — PROGRESS NOTES
Subjective:       Patient ID: Charu Lopez is a 69 y.o. female.    Chief Complaint: Sore Throat and Chest Congestion    HPI     69-year-old female here for evaluation of a sore throat.  She reports that her symptoms started Friday morning (about 5 days ago).  She reports that her throat has been very sore, but not so much today.  She has been coughing.  She has some sniffles and a little runny nose.  She is coughing up dark yellow mucus.  She has taken OTC tylenol cold, chloraseptic spray, cough drops.  She felt warm yesterday afternoon to evening.  No chills.  It does not feel like it is in her head.  It feels like it is in her chest.  No SOB.  She took a mucinex Friday.    Review of Systems    Objective:      Physical Exam   Constitutional: She is oriented to person, place, and time. She appears well-developed and well-nourished.   HENT:   Head: Normocephalic and atraumatic.   Mouth/Throat: No oropharyngeal exudate.   Eyes: Pupils are equal, round, and reactive to light. EOM are normal. Right eye exhibits no discharge. Left eye exhibits no discharge. No scleral icterus.   Neck: Normal range of motion. Neck supple. No tracheal deviation present. No thyromegaly present.   Cardiovascular: Normal rate, regular rhythm and normal heart sounds. Exam reveals no gallop and no friction rub.   No murmur heard.  Pulmonary/Chest: Effort normal and breath sounds normal. No respiratory distress. She has no wheezes. She has no rales. She exhibits no tenderness.   Abdominal: Soft. Bowel sounds are normal. She exhibits no distension and no mass. There is no tenderness. There is no rebound and no guarding.   Musculoskeletal: Normal range of motion. She exhibits no edema or tenderness.   Neurological: She is alert and oriented to person, place, and time.   Skin: Skin is warm and dry. No rash noted. No erythema. No pallor.   Psychiatric: She has a normal mood and affect. Her behavior is normal.   Vitals reviewed.       Assessment:       1. Bronchitis        Plan:       1.  Likely viral.  Patient advised to use Mucinex over-the-counter.  Can continue with Tylenol cold and Sinus.  If patient worsens on Friday or is no better by Monday, call back for an antibiotic.  Would consider a Z-Loco.

## 2019-10-29 NOTE — TELEPHONE ENCOUNTER
Sore throat since Friday.    Reason for Disposition   Earache also present    Additional Information   Negative: [1] Diagnosed strep throat AND [2] taking antibiotic AND [3] symptoms continue   Negative: Throat culture results, call about   Negative: Productive cough is main symptom   Negative: Non-productive cough is main symptom   Negative: Hoarseness is main symptom   Negative: Runny nose is main symptom   Negative: [1] Drooling or spitting out saliva (because can't swallow) AND [2] normal breathing   Negative: Unable to open mouth completely   Negative: [1] Difficulty breathing AND [2] not severe   Negative: Fever > 104 F (40 C)   Negative: [1] Refuses to drink anything AND [2] for > 12 hours   Negative: [1] Drinking very little AND [2] dehydration suspected (e.g., no urine > 12 hours, very dry mouth, very lightheaded)   Negative: Patient sounds very sick or weak to the triager    Protocols used: SORE THROAT-A-

## 2019-10-29 NOTE — TELEPHONE ENCOUNTER
Mammogram was performed on October 28, 2019.  Mammogram is within normal limits with no mammographic evidence of breast malignancy.

## 2019-10-30 ENCOUNTER — OFFICE VISIT (OUTPATIENT)
Dept: INTERNAL MEDICINE | Facility: CLINIC | Age: 69
End: 2019-10-30
Payer: MEDICARE

## 2019-10-30 VITALS
BODY MASS INDEX: 31.32 KG/M2 | DIASTOLIC BLOOD PRESSURE: 78 MMHG | WEIGHT: 194.88 LBS | RESPIRATION RATE: 14 BRPM | HEIGHT: 66 IN | SYSTOLIC BLOOD PRESSURE: 114 MMHG | HEART RATE: 70 BPM

## 2019-10-30 DIAGNOSIS — J40 BRONCHITIS: Primary | ICD-10-CM

## 2019-10-30 PROCEDURE — 1100F PTFALLS ASSESS-DOCD GE2>/YR: CPT | Mod: HCNC,CPTII,S$GLB, | Performed by: INTERNAL MEDICINE

## 2019-10-30 PROCEDURE — 99999 PR PBB SHADOW E&M-EST. PATIENT-LVL III: ICD-10-PCS | Mod: PBBFAC,HCNC,, | Performed by: INTERNAL MEDICINE

## 2019-10-30 PROCEDURE — 99213 PR OFFICE/OUTPT VISIT, EST, LEVL III, 20-29 MIN: ICD-10-PCS | Mod: HCNC,S$GLB,, | Performed by: INTERNAL MEDICINE

## 2019-10-30 PROCEDURE — 3288F PR FALLS RISK ASSESSMENT DOCUMENTED: ICD-10-PCS | Mod: HCNC,CPTII,S$GLB, | Performed by: INTERNAL MEDICINE

## 2019-10-30 PROCEDURE — 1100F PR PT FALLS ASSESS DOC 2+ FALLS/FALL W/INJURY/YR: ICD-10-PCS | Mod: HCNC,CPTII,S$GLB, | Performed by: INTERNAL MEDICINE

## 2019-10-30 PROCEDURE — 99999 PR PBB SHADOW E&M-EST. PATIENT-LVL III: CPT | Mod: PBBFAC,HCNC,, | Performed by: INTERNAL MEDICINE

## 2019-10-30 PROCEDURE — 99213 OFFICE O/P EST LOW 20 MIN: CPT | Mod: HCNC,S$GLB,, | Performed by: INTERNAL MEDICINE

## 2019-10-30 PROCEDURE — 3288F FALL RISK ASSESSMENT DOCD: CPT | Mod: HCNC,CPTII,S$GLB, | Performed by: INTERNAL MEDICINE

## 2019-10-31 ENCOUNTER — PATIENT OUTREACH (OUTPATIENT)
Dept: ADMINISTRATIVE | Facility: HOSPITAL | Age: 69
End: 2019-10-31

## 2019-10-31 NOTE — LETTER
AUTHORIZATION FOR RELEASE OF   CONFIDENTIAL INFORMATION    Dear [facility],    We are seeing Charu Lopez, date of birth 1950, in the clinic at [facility]. Sahil Garibay DO is the patient's PCP. Charu Lopez has an outstanding lab/procedure at the time we reviewed her chart. In order to help keep her health information updated, she has authorized us to request the following medical record(s):        (  )  MAMMOGRAM                                      ( x )  COLONOSCOPY      (  )  PAP SMEAR                                          (  )  OUTSIDE LAB RESULTS     (  )  DEXA SCAN                                          (  )  EYE EXAM            (  )  FOOT EXAM                                          (  )  ENTIRE RECORD     (  )  OUTSIDE IMMUNIZATIONS                 (  )  _______________         Please fax records to [facility], Brian M Helmstetter, DO, [phone]     If you have any questions, please contact ANNE Navarrete at [phone]          Patient Name: Charu Lopez  : 1950  Patient Phone #: [phone]

## 2019-11-04 ENCOUNTER — PATIENT MESSAGE (OUTPATIENT)
Dept: INTERNAL MEDICINE | Facility: CLINIC | Age: 69
End: 2019-11-04

## 2019-11-05 ENCOUNTER — PATIENT MESSAGE (OUTPATIENT)
Dept: INTERNAL MEDICINE | Facility: CLINIC | Age: 69
End: 2019-11-05

## 2019-11-05 LAB — HEMOCCULT STL QL IA: POSITIVE

## 2019-11-05 RX ORDER — AZITHROMYCIN 250 MG/1
TABLET, FILM COATED ORAL
Qty: 6 TABLET | Refills: 0 | Status: SHIPPED | OUTPATIENT
Start: 2019-11-05 | End: 2020-09-18

## 2019-11-05 NOTE — TELEPHONE ENCOUNTER
Left msg at [facility] main cancelling zpac rx.    Called in generic zpac to [facility] [phone] recorder

## 2019-11-06 ENCOUNTER — TELEPHONE (OUTPATIENT)
Dept: INTERNAL MEDICINE | Facility: CLINIC | Age: 69
End: 2019-11-06

## 2019-11-06 DIAGNOSIS — Z00.00 ANNUAL PHYSICAL EXAM: Primary | ICD-10-CM

## 2019-11-12 ENCOUNTER — PATIENT OUTREACH (OUTPATIENT)
Dept: ADMINISTRATIVE | Facility: OTHER | Age: 69
End: 2019-11-12

## 2019-11-14 ENCOUNTER — OFFICE VISIT (OUTPATIENT)
Dept: DERMATOLOGY | Facility: CLINIC | Age: 69
End: 2019-11-14
Payer: MEDICARE

## 2019-11-14 DIAGNOSIS — D18.01 ANGIOMA OF SKIN: ICD-10-CM

## 2019-11-14 DIAGNOSIS — L72.0 EIC (EPIDERMAL INCLUSION CYST): ICD-10-CM

## 2019-11-14 DIAGNOSIS — Z12.83 SCREENING EXAM FOR SKIN CANCER: ICD-10-CM

## 2019-11-14 DIAGNOSIS — L82.1 SK (SEBORRHEIC KERATOSIS): Primary | ICD-10-CM

## 2019-11-14 DIAGNOSIS — D22.9 MULTIPLE BENIGN NEVI: ICD-10-CM

## 2019-11-14 PROCEDURE — 1101F PR PT FALLS ASSESS DOC 0-1 FALLS W/OUT INJ PAST YR: ICD-10-PCS | Mod: HCNC,CPTII,S$GLB, | Performed by: DERMATOLOGY

## 2019-11-14 PROCEDURE — 99214 PR OFFICE/OUTPT VISIT, EST, LEVL IV, 30-39 MIN: ICD-10-PCS | Mod: HCNC,S$GLB,, | Performed by: DERMATOLOGY

## 2019-11-14 PROCEDURE — 99999 PR PBB SHADOW E&M-EST. PATIENT-LVL II: CPT | Mod: PBBFAC,HCNC,, | Performed by: DERMATOLOGY

## 2019-11-14 PROCEDURE — 99214 OFFICE O/P EST MOD 30 MIN: CPT | Mod: HCNC,S$GLB,, | Performed by: DERMATOLOGY

## 2019-11-14 PROCEDURE — 1101F PT FALLS ASSESS-DOCD LE1/YR: CPT | Mod: HCNC,CPTII,S$GLB, | Performed by: DERMATOLOGY

## 2019-11-14 PROCEDURE — 99999 PR PBB SHADOW E&M-EST. PATIENT-LVL II: ICD-10-PCS | Mod: PBBFAC,HCNC,, | Performed by: DERMATOLOGY

## 2019-11-14 NOTE — PROGRESS NOTES
Subjective:       Patient ID:  Charu Lopez is a 69 y.o. female who presents for   Chief Complaint   Patient presents with    Skin Check     TBSE      History of Present Illness: The patient presents for follow up of skin check.    The patient was last seen on: 8/18 for cryosurgery to actinic keratoses which have resolved.   No h/o nmsc or mm      Other skin complaints: h/o ? Impetigo 2 months ago - treated by PCP. Now with scar        Review of Systems   Skin: Positive for daily sunscreen use (spf 20) and activity-related sunscreen use. Negative for recent sunburn.   Hematologic/Lymphatic: Does not bruise/bleed easily.        Objective:    Physical Exam   Constitutional: She appears well-developed and well-nourished. No distress.   Neurological: She is alert and oriented to person, place, and time. She is not disoriented.   Psychiatric: She has a normal mood and affect.   Skin:   Areas Examined (abnormalities noted in diagram):   Scalp / Hair Palpated and Inspected  Head / Face Inspection Performed  Neck Inspection Performed  Chest / Axilla Inspection Performed  Abdomen Inspection Performed  Genitals / Buttocks / Groin Inspection Performed  Back Inspection Performed  RUE Inspected  LUE Inspection Performed  RLE Inspected  LLE Inspection Performed  Nails and Digits Inspection Performed                       Diagram Legend     Erythematous scaling macule/papule c/w actinic keratosis       Vascular papule c/w angioma      Pigmented verrucoid papule/plaque c/w seborrheic keratosis      Yellow umbilicated papule c/w sebaceous hyperplasia      Irregularly shaped tan macule c/w lentigo     1-2 mm smooth white papules consistent with Milia      Movable subcutaneous cyst with punctum c/w epidermal inclusion cyst      Subcutaneous movable cyst c/w pilar cyst      Firm pink to brown papule c/w dermatofibroma      Pedunculated fleshy papule(s) c/w skin tag(s)      Evenly pigmented macule c/w junctional  nevus     Mildly variegated pigmented, slightly irregular-bordered macule c/w mildly atypical nevus      Flesh colored to evenly pigmented papule c/w intradermal nevus       Pink pearly papule/plaque c/w basal cell carcinoma      Erythematous hyperkeratotic cursted plaque c/w SCC      Surgical scar with no sign of skin cancer recurrence      Open and closed comedones      Inflammatory papules and pustules      Verrucoid papule consistent consistent with wart     Erythematous eczematous patches and plaques     Dystrophic onycholytic nail with subungual debris c/w onychomycosis     Umbilicated papule    Erythematous-base heme-crusted tan verrucoid plaque consistent with inflamed seborrheic keratosis     Erythematous Silvery Scaling Plaque c/w Psoriasis     See annotation      Assessment / Plan:        SK (seborrheic keratosis)  These are benign inherited growths without a malignant potential. Reassurance given to patient. No treatment is necessary.       EIC (epidermal inclusion cyst)   - stable and chronic      Angioma of skin   - stable and chronic    Multiple benign nevi   - stable and chronic    Screening exam for skin cancer  Total body skin examination performed today including at least 12 points as noted in physical examination. No lesions suspicious for malignancy noted.             Follow up if symptoms worsen or fail to improve.

## 2019-11-14 NOTE — LETTER
November 14, 2019      Sahil Garibay, DO  [address] [facility] [place] [address]           [facility]  [address]  [place] [address]  Phone: [phone]  Fax: [phone]          Patient: Charu Lopez   MR Number: [id number]   YOB: 1950   Date of Visit: 11/14/2019       Dear Dr. Sahil Garibay:    Thank you for referring Charu Lopez to me for evaluation. Attached you will find relevant portions of my assessment and plan of care.    If you have questions, please do not hesitate to call me. I look forward to following Charu Lopez along with you.    Sincerely,    Mary Valerio MD    Enclosure  CC:  No Recipients    If you would like to receive this communication electronically, please contact [email or web address] or [phone] to request more information on [employer] Link access.    For providers and/or their staff who would like to refer a patient to [facility], please contact us through our one-stop-shop provider referral line, [facility], at [phone].    If you feel you have received this communication in error or would no longer like to receive these types of communications, please e-mail [email or web address]

## 2019-11-26 ENCOUNTER — PATIENT OUTREACH (OUTPATIENT)
Dept: ADMINISTRATIVE | Facility: OTHER | Age: 69
End: 2019-11-26

## 2019-11-28 NOTE — PROGRESS NOTES
CRS Office Visit History and Physical      SUBJECTIVE:     Chief Complaint:  Would like to meet with physician prior to screening colonoscopy    History of Present Illness:  The patient is new patient to this practice.   Course is as follows:  Patient is a 69 y.o. female presents for discussion about colonoscopy.  States the last colonoscopy was 12 years ago.  No history of polyps.  She does have a history of colon cancer in her father at age 50, which she thinks was related to asbestos exposure.  Underwent fit test this month, which was positive.  Denies any abdominal pain or blood in her stool.      Review of patient's allergies indicates:   Allergen Reactions    Benadryl [diphenhydramine hcl]     Tetracyclines      Stomach upset         Past Medical History:   Diagnosis Date    Allergy birth    benadryl    Cataract     Endometrioma of ovary     Fever blister     Osteopenia     Skin disease august 29, 2019     Past Surgical History:   Procedure Laterality Date    BREAST BIOPSY  91    wnl     ENDOMETRIAL BIOPSY  12/12/2012    laparatomy 1994      OOPHORECTOMY  1995    Left ov removal due to endometrioma    SKIN BIOPSY  1990s     Family History   Problem Relation Age of Onset    Cancer Father     Colon cancer Father 58    Diabetes Father     Hypertension Father     Stroke Mother     Dementia Mother     Breast cancer Neg Hx     Ovarian cancer Neg Hx     Amblyopia Neg Hx     Strabismus Neg Hx     Thyroid disease Neg Hx     Retinal detachment Neg Hx     Macular degeneration Neg Hx     Glaucoma Neg Hx     Cataracts Neg Hx     Blindness Neg Hx     Melanoma Neg Hx     Psoriasis Neg Hx      Social History     Tobacco Use    Smoking status: Never Smoker    Smokeless tobacco: Never Used   Substance Use Topics    Alcohol use: No    Drug use: Never        Review of Systems:  Review of Systems   Gastrointestinal: Negative for blood in stool, constipation and diarrhea.   All other systems  "reviewed and are negative.      OBJECTIVE:     Vital Signs (Most Recent)  BP (!) 145/80 (BP Location: Left arm, Patient Position: Sitting, BP Method: Large (Automatic))   Pulse 97   Ht 5' 7" (1.702 m)   Wt 88.8 kg (195 lb 12.3 oz)   BMI 30.66 kg/m²     Physical Exam:  General: White female in no distress   Neuro: Alert and oriented x 4.  Moves all extremities.     HEENT: No icterus.  Trachea midline  Respiratory: Respirations are even and unlabored  Cardiac: Regular rate  Extremities: Warm dry and intact  Skin: No rashes      ASSESSMENT/PLAN:     Charu was seen today for colonoscopy.    Diagnoses and all orders for this visit:    Screening for malignant neoplasm of colon        Questions regarding upcoming screening colonoscopy addressed today in clinic.  Patient was brought to colonoscopy scheduling office to pick date for her procedure and review instructions prior to procedure.    Maria Fernanda Miranda MD  Staff Surgeon  Colon & Rectal Surgery  "

## 2019-11-29 ENCOUNTER — OFFICE VISIT (OUTPATIENT)
Dept: SURGERY | Facility: CLINIC | Age: 69
End: 2019-11-29
Attending: COLON & RECTAL SURGERY
Payer: MEDICARE

## 2019-11-29 ENCOUNTER — TELEPHONE (OUTPATIENT)
Dept: SURGERY | Facility: CLINIC | Age: 69
End: 2019-11-29

## 2019-11-29 ENCOUNTER — TELEPHONE (OUTPATIENT)
Dept: ENDOSCOPY | Facility: HOSPITAL | Age: 69
End: 2019-11-29

## 2019-11-29 VITALS
HEIGHT: 67 IN | HEART RATE: 97 BPM | SYSTOLIC BLOOD PRESSURE: 145 MMHG | BODY MASS INDEX: 30.72 KG/M2 | WEIGHT: 195.75 LBS | DIASTOLIC BLOOD PRESSURE: 80 MMHG

## 2019-11-29 DIAGNOSIS — Z12.11 SCREENING FOR MALIGNANT NEOPLASM OF COLON: Primary | ICD-10-CM

## 2019-11-29 PROCEDURE — 99999 PR PBB SHADOW E&M-EST. PATIENT-LVL III: ICD-10-PCS | Mod: PBBFAC,HCNC,, | Performed by: COLON & RECTAL SURGERY

## 2019-11-29 PROCEDURE — 99202 PR OFFICE/OUTPT VISIT, NEW, LEVL II, 15-29 MIN: ICD-10-PCS | Mod: HCNC,S$GLB,, | Performed by: COLON & RECTAL SURGERY

## 2019-11-29 PROCEDURE — 1126F AMNT PAIN NOTED NONE PRSNT: CPT | Mod: HCNC,S$GLB,, | Performed by: COLON & RECTAL SURGERY

## 2019-11-29 PROCEDURE — 99202 OFFICE O/P NEW SF 15 MIN: CPT | Mod: HCNC,S$GLB,, | Performed by: COLON & RECTAL SURGERY

## 2019-11-29 PROCEDURE — 1101F PR PT FALLS ASSESS DOC 0-1 FALLS W/OUT INJ PAST YR: ICD-10-PCS | Mod: HCNC,CPTII,S$GLB, | Performed by: COLON & RECTAL SURGERY

## 2019-11-29 PROCEDURE — 1101F PT FALLS ASSESS-DOCD LE1/YR: CPT | Mod: HCNC,CPTII,S$GLB, | Performed by: COLON & RECTAL SURGERY

## 2019-11-29 PROCEDURE — 1126F PR PAIN SEVERITY QUANTIFIED, NO PAIN PRESENT: ICD-10-PCS | Mod: HCNC,S$GLB,, | Performed by: COLON & RECTAL SURGERY

## 2019-11-29 PROCEDURE — 99999 PR PBB SHADOW E&M-EST. PATIENT-LVL III: CPT | Mod: PBBFAC,HCNC,, | Performed by: COLON & RECTAL SURGERY

## 2019-11-29 PROCEDURE — 1159F PR MEDICATION LIST DOCUMENTED IN MEDICAL RECORD: ICD-10-PCS | Mod: HCNC,S$GLB,, | Performed by: COLON & RECTAL SURGERY

## 2019-11-29 PROCEDURE — 1159F MED LIST DOCD IN RCRD: CPT | Mod: HCNC,S$GLB,, | Performed by: COLON & RECTAL SURGERY

## 2019-11-29 NOTE — TELEPHONE ENCOUNTER
Patient in office to schedule Colonoscopy procedure ordered. Explained to patient that per Endoscopy current policy, she will need someone to come with her to the procedure, and stay in the facility and also to  her home on the day of the procedure. Patient stated she does not have anyone available to come with her and stay during the procedure. Offered patient option to arrange [facility] for assistance. Patient refused and walked out of office.

## 2019-11-29 NOTE — LETTER
November 29, 2019      Sahil Garibay, DO  [address] [facility] [place] [address]           [facility]  [address]  [facility] [address]  Phone: [phone]          Patient: Charu Lopez   MR Number: [id number]   YOB: 1950   Date of Visit: 11/29/2019       Dear Dr. Sahil Garibay:    Thank you for referring Charu Lopez to me for evaluation. Attached you will find relevant portions of my assessment and plan of care.    If you have questions, please do not hesitate to call me. I look forward to following Charu Lopez along with you.    Sincerely,    Maria Fernanda Miranda MD    Enclosure  CC:  No Recipients    If you would like to receive this communication electronically, please contact [email or web address] or [phone] to request more information on [employer] Link access.    For providers and/or their staff who would like to refer a patient to [facility], please contact us through our one-stop-shop provider referral line, [facility], at [phone].    If you feel you have received this communication in error or would no longer like to receive these types of communications, please e-mail [email or web address]

## 2019-12-03 ENCOUNTER — PATIENT MESSAGE (OUTPATIENT)
Dept: INTERNAL MEDICINE | Facility: CLINIC | Age: 69
End: 2019-12-03

## 2019-12-11 ENCOUNTER — PATIENT MESSAGE (OUTPATIENT)
Dept: INTERNAL MEDICINE | Facility: CLINIC | Age: 69
End: 2019-12-11

## 2019-12-23 DIAGNOSIS — Z12.11 SPECIAL SCREENING FOR MALIGNANT NEOPLASMS, COLON: Primary | ICD-10-CM

## 2019-12-23 RX ORDER — POLYETHYLENE GLYCOL 3350, SODIUM SULFATE ANHYDROUS, SODIUM BICARBONATE, SODIUM CHLORIDE, POTASSIUM CHLORIDE 236; 22.74; 6.74; 5.86; 2.97 G/4L; G/4L; G/4L; G/4L; G/4L
4 POWDER, FOR SOLUTION ORAL ONCE
Qty: 4000 ML | Refills: 0 | Status: SHIPPED | OUTPATIENT
Start: 2019-12-23 | End: 2019-12-23

## 2019-12-25 ENCOUNTER — PATIENT MESSAGE (OUTPATIENT)
Dept: INTERNAL MEDICINE | Facility: CLINIC | Age: 69
End: 2019-12-25

## 2019-12-27 ENCOUNTER — TELEPHONE (OUTPATIENT)
Dept: INTERNAL MEDICINE | Facility: CLINIC | Age: 69
End: 2019-12-27

## 2019-12-27 NOTE — TELEPHONE ENCOUNTER
----- Message from Maria Fernanda Guerra LPN sent at 12/27/2019  8:56 AM CST -----  Contact: Patient Portal      ----- Message -----  From: Marcial Aguayo Patient Care Assistant  Sent: 12/27/2019   8:54 AM CST  To: [facility] Clinical Support    ---- Message -----     From: Charu Lopez     Sent: 12/25/2019 11:35 AM CST       To: Patient Appointment Schedule Request Mailing List  Subject: Appointment Request    Appointment Request From: Charu Lopez    With Provider: nurse practitioner    Preferred Date Range: 1/15/2020 - 1/15/2020    Preferred Times: Wednesday Morning    Reason for visit: Medicare wellness    Comments:  wellness visit blood results

## 2019-12-30 ENCOUNTER — PATIENT MESSAGE (OUTPATIENT)
Dept: INTERNAL MEDICINE | Facility: CLINIC | Age: 69
End: 2019-12-30

## 2020-01-03 ENCOUNTER — PATIENT MESSAGE (OUTPATIENT)
Dept: INTERNAL MEDICINE | Facility: CLINIC | Age: 70
End: 2020-01-03

## 2020-01-15 ENCOUNTER — TELEPHONE (OUTPATIENT)
Dept: INTERNAL MEDICINE | Facility: CLINIC | Age: 70
End: 2020-01-15

## 2020-01-15 ENCOUNTER — PATIENT MESSAGE (OUTPATIENT)
Dept: INTERNAL MEDICINE | Facility: CLINIC | Age: 70
End: 2020-01-15

## 2020-01-15 DIAGNOSIS — Z78.0 MENOPAUSE: ICD-10-CM

## 2020-01-15 DIAGNOSIS — M85.80 OSTEOPENIA, UNSPECIFIED LOCATION: ICD-10-CM

## 2020-01-15 DIAGNOSIS — Z13.6 ENCOUNTER FOR SCREENING FOR CARDIOVASCULAR DISORDERS: ICD-10-CM

## 2020-01-15 DIAGNOSIS — N80.129 ENDOMETRIOMA OF OVARY: ICD-10-CM

## 2020-01-15 DIAGNOSIS — Z00.00 ANNUAL PHYSICAL EXAM: Primary | ICD-10-CM

## 2020-01-15 DIAGNOSIS — Z13.220 SCREENING FOR HYPERLIPIDEMIA: ICD-10-CM

## 2020-01-15 DIAGNOSIS — L01.00 IMPETIGO: ICD-10-CM

## 2020-01-15 NOTE — TELEPHONE ENCOUNTER
----- Message from Juventino Deleon sent at 1/15/2020 10:14 AM CST -----  Contact: [phone] / self   Patient has an upcoming appointment with your office and would like to speak with a nurse regarding blood work. Please Advise.

## 2020-01-16 ENCOUNTER — LAB VISIT (OUTPATIENT)
Dept: LAB | Facility: HOSPITAL | Age: 70
End: 2020-01-16
Attending: INTERNAL MEDICINE
Payer: MEDICARE

## 2020-01-16 DIAGNOSIS — Z13.6 ENCOUNTER FOR SCREENING FOR CARDIOVASCULAR DISORDERS: ICD-10-CM

## 2020-01-16 DIAGNOSIS — Z13.220 SCREENING FOR HYPERLIPIDEMIA: ICD-10-CM

## 2020-01-16 DIAGNOSIS — M85.80 OSTEOPENIA, UNSPECIFIED LOCATION: ICD-10-CM

## 2020-01-16 DIAGNOSIS — Z78.0 MENOPAUSE: ICD-10-CM

## 2020-01-16 DIAGNOSIS — Z00.00 ANNUAL PHYSICAL EXAM: ICD-10-CM

## 2020-01-16 DIAGNOSIS — N80.129 ENDOMETRIOMA OF OVARY: ICD-10-CM

## 2020-01-16 DIAGNOSIS — L01.00 IMPETIGO: ICD-10-CM

## 2020-01-16 LAB
ALBUMIN SERPL BCP-MCNC: 3.7 G/DL (ref 3.5–5.2)
ALP SERPL-CCNC: 93 U/L (ref 55–135)
ALT SERPL W/O P-5'-P-CCNC: 21 U/L (ref 10–44)
ANION GAP SERPL CALC-SCNC: 11 MMOL/L (ref 8–16)
AST SERPL-CCNC: 24 U/L (ref 10–40)
BASOPHILS # BLD AUTO: 0.05 K/UL (ref 0–0.2)
BASOPHILS NFR BLD: 1.3 % (ref 0–1.9)
BILIRUB SERPL-MCNC: 0.5 MG/DL (ref 0.1–1)
BUN SERPL-MCNC: 18 MG/DL (ref 8–23)
CALCIUM SERPL-MCNC: 9.4 MG/DL (ref 8.7–10.5)
CHLORIDE SERPL-SCNC: 103 MMOL/L (ref 95–110)
CHOLEST SERPL-MCNC: 108 MG/DL (ref 120–199)
CHOLEST/HDLC SERPL: 1.6 {RATIO} (ref 2–5)
CO2 SERPL-SCNC: 25 MMOL/L (ref 23–29)
CREAT SERPL-MCNC: 1 MG/DL (ref 0.5–1.4)
DIFFERENTIAL METHOD: ABNORMAL
EOSINOPHIL # BLD AUTO: 0.2 K/UL (ref 0–0.5)
EOSINOPHIL NFR BLD: 5.8 % (ref 0–8)
ERYTHROCYTE [DISTWIDTH] IN BLOOD BY AUTOMATED COUNT: 13.3 % (ref 11.5–14.5)
EST. GFR  (AFRICAN AMERICAN): >60 ML/MIN/1.73 M^2
EST. GFR  (NON AFRICAN AMERICAN): 58 ML/MIN/1.73 M^2
GLUCOSE SERPL-MCNC: 91 MG/DL (ref 70–110)
HCT VFR BLD AUTO: 46.6 % (ref 37–48.5)
HDLC SERPL-MCNC: 69 MG/DL (ref 40–75)
HDLC SERPL: 63.9 % (ref 20–50)
HGB BLD-MCNC: 14.5 G/DL (ref 12–16)
IMM GRANULOCYTES # BLD AUTO: 0 K/UL (ref 0–0.04)
IMM GRANULOCYTES NFR BLD AUTO: 0 % (ref 0–0.5)
LDLC SERPL CALC-MCNC: 36.4 MG/DL (ref 63–159)
LYMPHOCYTES # BLD AUTO: 1.5 K/UL (ref 1–4.8)
LYMPHOCYTES NFR BLD: 39 % (ref 18–48)
MCH RBC QN AUTO: 30.6 PG (ref 27–31)
MCHC RBC AUTO-ENTMCNC: 31.1 G/DL (ref 32–36)
MCV RBC AUTO: 98 FL (ref 82–98)
MONOCYTES # BLD AUTO: 0.6 K/UL (ref 0.3–1)
MONOCYTES NFR BLD: 16.7 % (ref 4–15)
NEUTROPHILS # BLD AUTO: 1.4 K/UL (ref 1.8–7.7)
NEUTROPHILS NFR BLD: 37.2 % (ref 38–73)
NONHDLC SERPL-MCNC: 39 MG/DL
NRBC BLD-RTO: 0 /100 WBC
PLATELET # BLD AUTO: 239 K/UL (ref 150–350)
PMV BLD AUTO: 11.8 FL (ref 9.2–12.9)
POTASSIUM SERPL-SCNC: 4.4 MMOL/L (ref 3.5–5.1)
PROT SERPL-MCNC: 6.9 G/DL (ref 6–8.4)
RBC # BLD AUTO: 4.74 M/UL (ref 4–5.4)
SODIUM SERPL-SCNC: 139 MMOL/L (ref 136–145)
TRIGL SERPL-MCNC: 13 MG/DL (ref 30–150)
WBC # BLD AUTO: 3.77 K/UL (ref 3.9–12.7)

## 2020-01-16 PROCEDURE — 80061 LIPID PANEL: CPT | Mod: HCNC

## 2020-01-16 PROCEDURE — 36415 COLL VENOUS BLD VENIPUNCTURE: CPT | Mod: HCNC,PO

## 2020-01-16 PROCEDURE — 85025 COMPLETE CBC W/AUTO DIFF WBC: CPT | Mod: HCNC

## 2020-01-16 PROCEDURE — 80053 COMPREHEN METABOLIC PANEL: CPT | Mod: HCNC

## 2020-01-21 PROBLEM — M19.90 OSTEOARTHRITIS: Status: ACTIVE | Noted: 2020-01-21

## 2020-01-21 PROBLEM — J84.10 CALCIFIED GRANULOMA OF LUNG: Status: ACTIVE | Noted: 2020-01-21

## 2020-01-21 PROBLEM — Z80.0 FAMILY HISTORY OF COLON CANCER: Status: ACTIVE | Noted: 2020-01-21

## 2020-01-21 PROBLEM — R19.5 POSITIVE FIT (FECAL IMMUNOCHEMICAL TEST): Status: ACTIVE | Noted: 2020-01-21

## 2020-01-21 PROBLEM — M85.859 OSTEOPENIA OF HIP: Status: ACTIVE | Noted: 2020-01-21

## 2020-01-21 PROBLEM — J98.4 CALCIFIED GRANULOMA OF LUNG: Status: ACTIVE | Noted: 2020-01-21

## 2020-01-21 PROBLEM — H25.9 AGE-RELATED CATARACT OF BOTH EYES: Status: ACTIVE | Noted: 2020-01-21

## 2020-01-22 ENCOUNTER — TELEPHONE (OUTPATIENT)
Dept: INTERNAL MEDICINE | Facility: CLINIC | Age: 70
End: 2020-01-22

## 2020-01-23 ENCOUNTER — OFFICE VISIT (OUTPATIENT)
Dept: INTERNAL MEDICINE | Facility: CLINIC | Age: 70
End: 2020-01-23
Payer: MEDICARE

## 2020-01-23 VITALS
OXYGEN SATURATION: 96 % | HEIGHT: 67 IN | DIASTOLIC BLOOD PRESSURE: 70 MMHG | HEART RATE: 83 BPM | SYSTOLIC BLOOD PRESSURE: 126 MMHG | WEIGHT: 194.69 LBS | BODY MASS INDEX: 30.56 KG/M2 | RESPIRATION RATE: 15 BRPM

## 2020-01-23 DIAGNOSIS — J84.10 CALCIFIED GRANULOMA OF LUNG: ICD-10-CM

## 2020-01-23 DIAGNOSIS — Z00.00 ENCOUNTER FOR PREVENTIVE HEALTH EXAMINATION: ICD-10-CM

## 2020-01-23 DIAGNOSIS — Z80.0 FAMILY HISTORY OF COLON CANCER: ICD-10-CM

## 2020-01-23 DIAGNOSIS — E66.09 CLASS 1 OBESITY DUE TO EXCESS CALORIES WITHOUT SERIOUS COMORBIDITY WITH BODY MASS INDEX (BMI) OF 30.0 TO 30.9 IN ADULT: ICD-10-CM

## 2020-01-23 DIAGNOSIS — M85.859 OSTEOPENIA OF HIP, UNSPECIFIED LATERALITY: ICD-10-CM

## 2020-01-23 DIAGNOSIS — M15.9 PRIMARY OSTEOARTHRITIS INVOLVING MULTIPLE JOINTS: ICD-10-CM

## 2020-01-23 DIAGNOSIS — R19.5 POSITIVE FIT (FECAL IMMUNOCHEMICAL TEST): ICD-10-CM

## 2020-01-23 DIAGNOSIS — H25.9 AGE-RELATED CATARACT OF BOTH EYES, UNSPECIFIED AGE-RELATED CATARACT TYPE: ICD-10-CM

## 2020-01-23 DIAGNOSIS — D72.819 LEUKOPENIA, UNSPECIFIED TYPE: Primary | ICD-10-CM

## 2020-01-23 DIAGNOSIS — R94.4 DECREASED CALCULATED GLOMERULAR FILTRATION RATE (GFR): ICD-10-CM

## 2020-01-23 PROBLEM — E66.9 CLASS 1 OBESITY WITHOUT SERIOUS COMORBIDITY IN ADULT: Status: ACTIVE | Noted: 2020-01-23

## 2020-01-23 PROBLEM — E66.811 CLASS 1 OBESITY WITHOUT SERIOUS COMORBIDITY IN ADULT: Status: ACTIVE | Noted: 2020-01-23

## 2020-01-23 PROCEDURE — 99999 PR PBB SHADOW E&M-EST. PATIENT-LVL IV: CPT | Mod: PBBFAC,HCNC,, | Performed by: NURSE PRACTITIONER

## 2020-01-23 PROCEDURE — 99999 PR PBB SHADOW E&M-EST. PATIENT-LVL IV: ICD-10-PCS | Mod: PBBFAC,HCNC,, | Performed by: NURSE PRACTITIONER

## 2020-01-23 PROCEDURE — G0439 PPPS, SUBSEQ VISIT: HCPCS | Mod: HCNC,S$GLB,, | Performed by: NURSE PRACTITIONER

## 2020-01-23 PROCEDURE — 99499 RISK ADDL DX/OHS AUDIT: ICD-10-PCS | Mod: HCNC,S$GLB,, | Performed by: NURSE PRACTITIONER

## 2020-01-23 PROCEDURE — 99499 UNLISTED E&M SERVICE: CPT | Mod: HCNC,S$GLB,, | Performed by: NURSE PRACTITIONER

## 2020-01-23 PROCEDURE — G0439 PR MEDICARE ANNUAL WELLNESS SUBSEQUENT VISIT: ICD-10-PCS | Mod: HCNC,S$GLB,, | Performed by: NURSE PRACTITIONER

## 2020-01-23 NOTE — PATIENT INSTRUCTIONS
Counseling and Referral of Other Preventative  (Italic type indicates deductible and co-insurance are waived)    Patient Name: Charu Lopez  Today's Date: 1/23/2020    Health Maintenance       Date Due Completion Date    Influenza Vaccine (1) Declined   ---    Pneumococcal Vaccine (65+ Low/Medium Risk) (1 of 2 - PCV13) Declined   ---    Shingles Vaccine (1 of 2) Declined   ---    DEXA SCAN 03/23/2020 3/23/2017        Mammogram 10/28/2020 10/28/2019    At [facility]        Lipid Panel 01/16/2021 1/16/2020    Colonoscopy 02/24/2020 scheduled 2/23/2017 (Done)        TETANUS VACCINE 1/5/2028 1/5/2018      Eye exam- current  Skin exam- current  Dental cleaning/exam- current            No orders of the defined types were placed in this encounter.    The following information is provided to all patients.  This information is to help you find resources for any of the problems found today that may be affecting your health:                Living healthy guide: www.[facility].[place].[facility]      Understanding Diabetes: www.diabetes.org      Eating healthy: www.cdc.gov/healthyweight      [facility] home safety checklist: www.cdc.gov/steadi/patient.html      Agency on Aging: www.goea.[place].[facility]      Alcoholics anonymous (AA): www.aa.org      Physical Activity: www.fred.nih.gov/zs9syaz      Tobacco use: www.quitwithusla.org     Exercises to Prevent Falls  Certain types of exercises may help make you less likely to fall. Try the ones below. Or do other exercises that your health care provider suggests. Depending on your health, you may need to start slowly. Don't let that stop you. Even small amounts of exercise can help you. Be sure to talk to your health care provider before starting any exercise program.       Improve balance  Many types of exercise can help improve balance. Evleio chi and yoga are good examples. Here's another one to try. You can do it anytime and almost anywhere.  · Stand next to a counter or solid support.  · Push  "yourself up onto your tiptoes.  · Hold for 5 seconds. If you start to lose your balance, hold on to the counter.  · Rest and repeat 5 times. Work up to holding for 20 to 30 seconds, if you can. Increase flexibility  Being more flexible makes it easier for you to move around safely. Try exercises like the seated hamstring stretch.  · Sit in a chair and put one foot on a stool.  · Straighten your leg and reach with both hands down either side of your leg. Reach as far down your leg as you can.  · Hold for about 20 seconds.  · Go back to the starting position. Then repeat 5 times. Switch legs. Build strength  "Resistance" exercises help build strength. You can do them without equipment. Or you can use weights, elastic bands, or special machines. One such exercise is called the biceps curl. You can hold a 1-pound weight or even a can of soup. Do this exercise at least 3 times a week. Strive for every day.  · Sit up straight in a chair.  · Keep your elbow close to your body and your wrist straight.  · Bend your arm, moving your hand up to your shoulder. Then slowly lower your arm.  · Repeat 5 times. Switch to the other arm.   Build your staying power  Aerobic exercises make your heart and lungs stronger so you can keep moving longer. Walking and swimming are two of the best types of exercises you can do. Using a stationary bike is great, too. Find an aerobic exercise that you enjoy. Start slowly and build up. Even 5 minutes is helpful. Aim for a goal of 30 minutes, at least 3 times a week. You don't have to do 30 minutes in 1 session. Break it up and walk a little throughout the day.  More helpful tips  · Start easy. Slowly work up to doing more.  · Talk with your health care provider about the best exercises for you.  · Call senior centers or health clubs about exercise programs.  · If needed, have a family member watch you walk every so often to check your stability.  · Exercise with a friend. Choose an activity you " both enjoy.  · Consider bert chi or yoga to strengthen your balance.  · Try exercises that you can do anytime, anywhere. Here are 2 examples. Have someone with you when you first try these:  ¨ Practice walking by placing 1 foot right in front of the other.  ¨ Stand up and sit down 10 times. Repeat this throughout the day.   Date Last Reviewed: 6/13/2015  © 5272-5212 [employer]. [address] [place] [address], [place] [address]. All rights reserved. This information is not intended as a substitute for professional medical care. Always follow your healthcare professional's instructions.        Preventing Falls: Making Changes in Your Living Space  Is your living space filled with hazards that could cause you to fall? Changes can make you safer. They could even save your life. Take a careful look around your home. Change what you can on your own. Hire someone or ask friends or family to help with harder tasks.      Be sure to add a nonslip mat to the inside of your shower or bathtub. Always keep a nightlight on. Keep a clear path from your bed to the bathroom. Move items from higher shelves to lower ones.   Remove hazards  · Remove things that can trip you, like throw rugs, boxes, piles of paper, or cords.  · Nail down rugs or carpeting if you don't want to remove them.  · Don't store items on stairs.  · Keep walkways clear.  · Clean up spills right away.  · Replace glass tables with wooden ones. They're safer if you fall.  Add safety devices  · Add handrails to both sides of stairs.  · Buy a raised toilet seat.  · Add grab bars near the toilet and in the shower.  · Get grabbers to help you reach things and avoid climbing.  Improve lighting  · Add nightlights to halls, bedrooms, and bathrooms.   · Put light switches at the top and bottom of stairs.  · Be sure each room and flight of stairs has proper lighting.  · Use shades or curtains to cut glare from windows.  · Put flashlights in each room. Replace burned-out  bulbs.  · Get glowing light switches for room entrances.  Take other precautions  · Use nonskid floor wax.  · Buy a nonslip mat and a liquid soap dispenser for the shower.  · Tack down carpets or use slip-resistant backing.  · Put most-used items within easy reach.  · Add bright paint or tape on the top front edge of steps.  · Save big jobs, such as moving furniture or other heavy objects, for family or friends.  · Get professional help installing grab bars. They can be unsafe if not installed the right way.  Fix riskier rooms first  Don't tackle everything at once. Focus on one room at a time. The bathroom is a common spot for falls, so you may start there. Or start with a room you spend lots of time in, such as your bedroom. Make only a few changes at once. This will give you time to adjust to them.     Outside your home  You might arrange for these changes yourself, or you might need to talk to your  or homeowners' association about them.  · Have loose boards on porches or damaged stairs repaired.  · Have rough edges, holes, or large cracks in sidewalks or driveways repaired.  · Have hazards that could trip you, such as hoses or dat, removed.  · Use high-wattage light bulbs (100 or greater) near outside doors and stairs.  · Get handrails added to outside stairs. Have them extend beyond the bottom step.  · Get help in winter weather with ice or snow removal.   Date Last Reviewed: 6/12/2015  © 4930-0244 [employer]. [address] [place] [address], [place] [address]. All rights reserved. This information is not intended as a substitute for professional medical care. Always follow your healthcare professional's instructions.

## 2020-01-23 NOTE — PROGRESS NOTES
"Charu Lopez presented for a  Medicare AWV and comprehensive Health Risk Assessment today. The following components were reviewed and updated:    · Medical history-   · Family History  · Social history- works at [employer].  · Allergies and Current Medications  · Health Risk Assessment  · Health Maintenance  · Care Team external [facility] for mammogram    ** See Completed Assessments for Annual Wellness Visit within the encounter summary.**       The following assessments were completed:  · Living Situation  · CAGE  · Depression Screening  · Timed Get Up and Go  · Whisper Test  · Cognitive Function Screening  · Nutrition Screening  · ADL Screening  · PAQ Screening    Vitals:    01/23/20 0751   BP: 126/70   BP Location: Right arm   Patient Position: Sitting   BP Method: Medium (Manual)   Pulse: 83   Resp: 15   SpO2: 96%   Weight: 88.3 kg (194 lb 10.7 oz)   Height: 5' 7" (1.702 m)     Body mass index is 30.49 kg/m².  Physical Exam   Constitutional: She is oriented to person, place, and time. She appears well-developed and well-nourished.   HENT:   Head: Normocephalic.   Right Ear: External ear normal.   Left Ear: External ear normal.   Mouth/Throat: No oropharyngeal exudate.   Cardiovascular: Normal rate, regular rhythm and normal heart sounds.   No murmur heard.  Pulmonary/Chest: Effort normal and breath sounds normal.   Abdominal: Soft. Bowel sounds are normal. She exhibits no distension.   obese   Musculoskeletal: Normal range of motion. She exhibits no edema.   Neurological: She is alert and oriented to person, place, and time. She exhibits normal muscle tone.   Skin: Skin is warm and dry.   Psychiatric: She has a normal mood and affect. Her behavior is normal. Judgment and thought content normal.   Nursing note and vitals reviewed.        Lab Results   Component Value Date    HGBA1C 5.8 03/22/2017    CHOL 108 (L) 01/16/2020    HDL 69 01/16/2020    LDLCALC 36.4 (L) 01/16/2020    TRIG 13 (L) 01/16/2020 "    CHOLHDL 63.9 (H) 01/16/2020      Results for orders placed during the hospital encounter of 03/23/17   DXA Bone Density Spine And Hip    Narrative Comparison: None.    Indication: Postmenopausal.    A quantitative bone mineral density was obtained using the DEXA technique.      The bone mineral density measured from L1 through L4 is 1.205g/cm2.  This corresponds to a T score of 0.1 and a Z score of 0.9.    The bone mineral density within the left femoral neck measures 0.940 g/cm2.  This corresponds to a T score of -0.7 and a Z score of 0.2.    The bone mineral density within the right femoral neck measures 0.887 g/cm2.  This corresponds to a T score of -1.1 and a Z score of -0.1.    FRAX RESULTS:  10-year Probability of Fracture:  Major Osteoporotic Fracture 7.7%.  Hip Fracture 0.6%.    Impression Osteopenia of the right hip. Normal bone density of the spine and left hip..    World Health Organization criteria:  Normal -- T score greater than or equal to -1.0.  Osteopenia -- T score between -1.0 and -2.5.  Osteoporosis -- T score less than or equal to -2.5.      Electronically signed by: PARI WHITE MD  Date:     03/23/17  Time:    08:48        Diagnoses and health risks identified today and associated recommendations/orders:    1. Leukopenia, unspecified type  Noted on labs 1/2020- followed by PCP- pt to schedule annual appt    2. Positive FIT (fecal immunochemical test)  Colonoscopy scheduled 2/24/2020    3. Family history of colon cancer  Stable- followed by PCP    4. Calcified granuloma of lung  Stable- followed by PCP-noted XCR dated 4/11/2013    5. Age-related cataract of both eyes, unspecified age-related cataract type  Stable- followed by opth    6. Primary osteoarthritis involving multiple joints  Stable- followed by PCPStable     7. Osteopenia of hip, unspecified laterality  Stable- followed by PCP      DXA 3/23/2017    8. Decreased calculated glomerular filtration rate (GFR)  followed by PCP    9.  Class 1 obesity due to excess calories without serious comorbidity with body mass index (BMI) of 30.0 to 30.9 in adult  Chronic. Followed by PCP.   Centers for Disease Control and Prevention (CDC)  weight recommendations for current BMI & ideal BMI range discussed with patient.  Recommended  Low fat diet, start graded walking regimen , include LE strengthening & balance exercises.      10. Encounter for preventive health examination  Assessments completed. Preventative health recommendations reviewed.         · Provided Charu with a 5-10 year written screening schedule and personal prevention plan. Recommendations were developed using the USPSTF age appropriate recommendations. Education, counseling, and referrals were provided as needed. After Visit Summary printed and given to patient which includes a list of additional screenings\tests needed. 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 Otherwise describes no routine exercise activity regimen-plans to start with losing 1 lb a week & start walking regularly. 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 Colonoscopy scheduled in feb for + fit test. Refer to after visit summary health maintenance chart for additional recommendations. SHe will schedule F/U with PCP for annual PCP visit & F/U of abn GFR,leukopenia, & above as documented in my notes.      Follow up in about 1 year (around 1/23/2021) for HRA.    Ange Ratliff NP I offered to discuss end of life issues, including information on how to make advance directives that the patient could use to name someone who would make medical decisions on their behalf if they became too ill to make themselves.    ___Patient declined  _X_Patient has completed.

## 2020-01-27 ENCOUNTER — TELEPHONE (OUTPATIENT)
Dept: INTERNAL MEDICINE | Facility: CLINIC | Age: 70
End: 2020-01-27

## 2020-01-30 ENCOUNTER — TELEPHONE (OUTPATIENT)
Dept: INTERNAL MEDICINE | Facility: CLINIC | Age: 70
End: 2020-01-30

## 2020-02-24 ENCOUNTER — ANESTHESIA (OUTPATIENT)
Dept: ENDOSCOPY | Facility: HOSPITAL | Age: 70
End: 2020-02-24
Payer: MEDICARE

## 2020-02-24 ENCOUNTER — HOSPITAL ENCOUNTER (OUTPATIENT)
Facility: HOSPITAL | Age: 70
Discharge: HOME | End: 2020-02-24
Attending: COLON & RECTAL SURGERY | Admitting: COLON & RECTAL SURGERY
Payer: MEDICARE

## 2020-02-24 ENCOUNTER — ANESTHESIA EVENT (OUTPATIENT)
Dept: ENDOSCOPY | Facility: HOSPITAL | Age: 70
End: 2020-02-24
Payer: MEDICARE

## 2020-02-24 VITALS
HEIGHT: 67 IN | WEIGHT: 185 LBS | BODY MASS INDEX: 29.03 KG/M2 | DIASTOLIC BLOOD PRESSURE: 89 MMHG | SYSTOLIC BLOOD PRESSURE: 142 MMHG | TEMPERATURE: 97 F | OXYGEN SATURATION: 99 % | RESPIRATION RATE: 16 BRPM | HEART RATE: 70 BPM

## 2020-02-24 DIAGNOSIS — Z80.0 FAMILY HISTORY OF COLON CANCER: Primary | ICD-10-CM

## 2020-02-24 PROCEDURE — 37000008 HC ANESTHESIA 1ST 15 MINUTES: Performed by: COLON & RECTAL SURGERY

## 2020-02-24 PROCEDURE — 88305 TISSUE EXAM BY PATHOLOGIST: CPT | Mod: 59 | Performed by: PATHOLOGY

## 2020-02-24 PROCEDURE — 45385 PR COLONOSCOPY,REMV LESN,SNARE: ICD-10-PCS | Mod: PT,,, | Performed by: COLON & RECTAL SURGERY

## 2020-02-24 PROCEDURE — 63600175 PHARM REV CODE 636 W HCPCS: Performed by: NURSE ANESTHETIST, CERTIFIED REGISTERED

## 2020-02-24 PROCEDURE — 25000003 PHARM REV CODE 250: Performed by: NURSE ANESTHETIST, CERTIFIED REGISTERED

## 2020-02-24 PROCEDURE — E9220 PRA ENDO ANESTHESIA: ICD-10-PCS | Mod: PT,,, | Performed by: NURSE ANESTHETIST, CERTIFIED REGISTERED

## 2020-02-24 PROCEDURE — 45385 COLONOSCOPY W/LESION REMOVAL: CPT | Performed by: COLON & RECTAL SURGERY

## 2020-02-24 PROCEDURE — 27201089 HC SNARE, DISP (ANY): Performed by: COLON & RECTAL SURGERY

## 2020-02-24 PROCEDURE — E9220 PRA ENDO ANESTHESIA: HCPCS | Mod: PT,,, | Performed by: NURSE ANESTHETIST, CERTIFIED REGISTERED

## 2020-02-24 PROCEDURE — 45385 COLONOSCOPY W/LESION REMOVAL: CPT | Mod: PT,,, | Performed by: COLON & RECTAL SURGERY

## 2020-02-24 PROCEDURE — 37000009 HC ANESTHESIA EA ADD 15 MINS: Performed by: COLON & RECTAL SURGERY

## 2020-02-24 PROCEDURE — 63600175 PHARM REV CODE 636 W HCPCS: Performed by: COLON & RECTAL SURGERY

## 2020-02-24 RX ORDER — SODIUM CHLORIDE 9 MG/ML
INJECTION, SOLUTION INTRAVENOUS CONTINUOUS
Status: DISCONTINUED | OUTPATIENT
Start: 2020-02-24 | End: 2020-02-24 | Stop reason: HOSPADM

## 2020-02-24 RX ORDER — PROPOFOL 10 MG/ML
VIAL (ML) INTRAVENOUS
Status: DISCONTINUED | OUTPATIENT
Start: 2020-02-24 | End: 2020-02-24

## 2020-02-24 RX ORDER — LIDOCAINE HYDROCHLORIDE 20 MG/ML
INJECTION INTRAVENOUS
Status: DISCONTINUED | OUTPATIENT
Start: 2020-02-24 | End: 2020-02-24

## 2020-02-24 RX ORDER — GLYCOPYRROLATE 0.2 MG/ML
INJECTION INTRAMUSCULAR; INTRAVENOUS
Status: DISCONTINUED | OUTPATIENT
Start: 2020-02-24 | End: 2020-02-24

## 2020-02-24 RX ORDER — PROPOFOL 10 MG/ML
VIAL (ML) INTRAVENOUS CONTINUOUS PRN
Status: DISCONTINUED | OUTPATIENT
Start: 2020-02-24 | End: 2020-02-24

## 2020-02-24 RX ADMIN — GLYCOPYRROLATE 0.1 MG: 0.2 INJECTION, SOLUTION INTRAMUSCULAR; INTRAVENOUS at 07:02

## 2020-02-24 RX ADMIN — PROPOFOL 150 MCG/KG/MIN: 10 INJECTION, EMULSION INTRAVENOUS at 07:02

## 2020-02-24 RX ADMIN — LIDOCAINE HYDROCHLORIDE 100 MG: 20 INJECTION, SOLUTION INTRAVENOUS at 07:02

## 2020-02-24 RX ADMIN — PROPOFOL 70 MG: 10 INJECTION, EMULSION INTRAVENOUS at 07:02

## 2020-02-24 RX ADMIN — SODIUM CHLORIDE: 0.9 INJECTION, SOLUTION INTRAVENOUS at 07:02

## 2020-02-24 NOTE — TRANSFER OF CARE
"Anesthesia Transfer of Care Note    Patient: Charu Lopez    Procedure(s) Performed: Procedure(s) (LRB):  COLONOSCOPY (N/A)    Patient location: PACU    Anesthesia Type: general    Transport from OR: Transported from OR on room air with adequate spontaneous ventilation    Post pain: adequate analgesia    Post assessment: no apparent anesthetic complications    Post vital signs: stable    Level of consciousness: awake    Nausea/Vomiting: no nausea/vomiting    Complications: none    Transfer of care protocol was followed      Last vitals:   Visit Vitals  BP (!) 109/58 (BP Location: Left arm, Patient Position: Lying)   Pulse 88   Temp 36.1 °C (97 °F)   Resp 16   Ht 5' 7" (1.702 m)   Wt 83.9 kg (185 lb)   SpO2 98%   Breastfeeding? No   BMI 28.98 kg/m²     "

## 2020-02-24 NOTE — [employer] PATIENT INSTRUCTIONS
Discharge Summary/Instructions after an Endoscopic Procedure  Patient Name: Charu Lopez  Patient MRN: [id number]  Patient YOB: 1950 Monday, February 24, 2020  Marko Webb MD  RESTRICTIONS:  During your procedure today, you received medications for sedation.  These   medications may affect your judgment, balance and coordination.  Therefore,   for 24 hours, you have the following restrictions:   - DO NOT drive a car, operate machinery, make legal/financial decisions,   sign important papers or drink alcohol.    ACTIVITY:  Today: no heavy lifting, straining or running due to procedural   sedation/anesthesia.  The following day: return to full activity including work.  DIET:  Eat and drink normally unless instructed otherwise.     TREATMENT FOR COMMON SIDE EFFECTS:  - Mild abdominal pain, nausea, belching, bloating or excessive gas:  rest,   eat lightly and use a heating pad.  - Sore Throat: treat with throat lozenges and/or gargle with warm salt   water.  - Because air was used during the procedure, expelling large amounts of air   from your rectum or belching is normal.  - If a bowel prep was taken, you may not have a bowel movement for 1-3 days.    This is normal.  SYMPTOMS TO WATCH FOR AND REPORT TO YOUR PHYSICIAN:  1. Abdominal pain or bloating, other than gas cramps.  2. Chest pain.  3. Back pain.  4. Signs of infection such as: chills or fever occurring within 24 hours   after the procedure.  5. Rectal bleeding, which would show as bright red, maroon, or black stools.   (A tablespoon of blood from the rectum is not serious, especially if   hemorrhoids are present.)  6. Vomiting.  7. Weakness or dizziness.  GO DIRECTLY TO THE NEAREST EMERGENCY ROOM IF YOU HAVE ANY OF THE FOLLOWING:      Difficulty breathing              Chills and/or fever over 101 F   Persistent vomiting and/or vomiting blood   Severe abdominal pain   Severe chest pain   Black, tarry stools   Bleeding- more than one  tablespoon   Any other symptom or condition that you feel may need urgent attention  Your doctor recommends these additional instructions:  If any biopsies were taken, your doctors clinic will contact you in 1 to 2   weeks with any results.  - Discharge patient to home (ambulatory).   - Patient has a contact number available for emergencies.  The signs and   symptoms of potential delayed complications were discussed with the   patient.  Return to normal activities tomorrow.  Written discharge   instructions were provided to the patient.   - Resume previous diet.   - Continue present medications.   - Await pathology results.   - Repeat colonoscopy in 3 - 5 years for surveillance based on pathology   results.  For questions, problems or results please call your physician - Marko Webb MD at Work:  [phone].  [facility], EMERGENCY ROOM PHONE NUMBER: [phone]  IF A COMPLICATION OR EMERGENCY SITUATION ARISES AND YOU ARE UNABLE TO REACH   YOUR PHYSICIAN - GO DIRECTLY TO THE EMERGENCY ROOM.  Marko Webb MD  2/24/2020 9:02:20 AM  This report has been verified and signed electronically.  [employer]

## 2020-02-24 NOTE — ANESTHESIA POSTPROCEDURE EVALUATION
Anesthesia Post Evaluation    Patient: Charu Lopez    Procedure(s) Performed: Procedure(s) (LRB):  COLONOSCOPY (N/A)    Final Anesthesia Type: general    Patient location during evaluation: GI PACU  Patient participation: Yes- Able to Participate  Level of consciousness: awake and alert and oriented  Post-procedure vital signs: reviewed and stable  Pain management: adequate  Airway patency: patent    PONV status at discharge: No PONV  Anesthetic complications: no      Cardiovascular status: blood pressure returned to baseline  Respiratory status: unassisted, spontaneous ventilation and room air  Hydration status: euvolemic  Follow-up not needed.          Vitals Value Taken Time   /89 2/24/2020  9:10 AM   Temp 36.1 °C (97 °F) 2/24/2020  8:41 AM   Pulse 70 2/24/2020  9:10 AM   Resp 16 2/24/2020  9:10 AM   SpO2 99 % 2/24/2020  8:50 AM         Event Time     Out of Recovery 09:50:14          Pain/Gely Score: Gely Score: 10 (2/24/2020  8:52 AM)

## 2020-02-24 NOTE — ANESTHESIA PREPROCEDURE EVALUATION
02/24/2020  Charu Lopez is a 70 y.o., female.    Patient Active Problem List   Diagnosis    Positive FIT (fecal immunochemical test)    Family history of colon cancer    Calcified granuloma of lung    Age-related cataract of both eyes    Osteoarthritis-knee, hand    Osteopenia of hip    Leukocytopenia    Decreased calculated glomerular filtration rate (GFR)    Class 1 obesity without serious comorbidity in adult       Past Medical History:   Diagnosis Date    Allergy birth    benadryl    Cataract     Endometrioma of ovary     Fever blister     Osteoarthritis-knee, hand 1/21/2020    Osteopenia     Skin disease august 29, 2019       Past Surgical History:   Procedure Laterality Date    BREAST BIOPSY  91    wnl     ENDOMETRIAL BIOPSY  12/12/2012    laparatomy 1994      OOPHORECTOMY  1995    Left ov removal due to endometrioma    SKIN BIOPSY  1990s           Anesthesia Evaluation    I have reviewed the Patient Summary Reports.    I have reviewed the Nursing Notes.   I have reviewed the Medications.     Review of Systems  Anesthesia Hx:  No problems with previous Anesthesia    Hematology/Oncology:  Hematology Normal   Oncology Normal     EENT/Dental:EENT/Dental Normal   Cardiovascular:  Cardiovascular Normal     Pulmonary:  Pulmonary Normal    Renal/:  Renal/ Normal     Hepatic/GI:  Hepatic/GI Normal    Musculoskeletal:   Arthritis     Neurological:  Neurology Normal    Endocrine:  Endocrine Normal    Dermatological:  Skin Normal    Psych:  Psychiatric Normal           Physical Exam  General:  Well nourished    Airway/Jaw/Neck:  Airway Findings: Mouth Opening: Normal Tongue: Normal  General Airway Assessment: Adult  Mallampati: II  TM Distance: Normal, at least 6 cm      Dental:  Dental Findings: In tact   Chest/Lungs:  Chest/Lungs Findings: Clear to auscultation, Normal  Respiratory Rate     Heart/Vascular:  Heart Findings: Rate: Normal  Rhythm: Regular Rhythm  Sounds: Normal             Anesthesia Plan  Type of Anesthesia, risks & benefits discussed:  Anesthesia Type:  general  Patient's Preference:   Intra-op Monitoring Plan: standard ASA monitors  Intra-op Monitoring Plan Comments:   Post Op Pain Control Plan:   Post Op Pain Control Plan Comments:   Induction:   IV  Beta Blocker:  Patient is not currently on a Beta-Blocker (No further documentation required).       Informed Consent: Patient understands risks and agrees with Anesthesia plan.  Questions answered. Anesthesia consent signed with patient.  ASA Score: 2     Day of Surgery Review of History & Physical:    H&P update referred to the provider.         Ready For Surgery From Anesthesia Perspective.

## 2020-02-24 NOTE — H&P
COLONOSCOPY HISTORY AND PHYSICAL EXAM    Procedure : Colonoscopy      INDICATIONS: family history of colon cancer (father) and positive FIT test    Family Hx of CRC: father, diagnosed at 50 years of age    Last Colonoscopy:  12 years ago  Findings: pt states it was negative       Past Medical History:   Diagnosis Date    Allergy birth    benadryl    Cataract     Endometrioma of ovary     Fever blister     Osteoarthritis-knee, hand 1/21/2020    Osteopenia     Skin disease august 29, 2019     Sedation Problems: NO  Family History   Problem Relation Age of Onset    Cancer Father     Colon cancer Father 58    Diabetes Father     Hypertension Father     Stroke Mother     Dementia Mother     Alzheimer's disease Sister     Polycystic kidney disease Daughter     No Known Problems Son     Breast cancer Neg Hx     Ovarian cancer Neg Hx     Amblyopia Neg Hx     Strabismus Neg Hx     Thyroid disease Neg Hx     Retinal detachment Neg Hx     Macular degeneration Neg Hx     Glaucoma Neg Hx     Cataracts Neg Hx     Blindness Neg Hx     Melanoma Neg Hx     Psoriasis Neg Hx      Fam Hx of Sedation Problems: NO  Social History     Socioeconomic History    Marital status: Single     Spouse name: Not on file    Number of children: Not on file    Years of education: Not on file    Highest education level: Not on file   Occupational History    Not on file   Social Needs    Financial resource strain: Not on file    Food insecurity:     Worry: Not on file     Inability: Not on file    Transportation needs:     Medical: Not on file     Non-medical: Not on file   Tobacco Use    Smoking status: Never Smoker    Smokeless tobacco: Never Used   Substance and Sexual Activity    Alcohol use: No    Drug use: Never    Sexual activity: Not Currently     Comment: , menarche 14,  age    Lifestyle    Physical activity:     Days per week: Not on file     Minutes per session: Not on file    Stress: Not  "on file   Relationships    Social connections:     Talks on phone: Not on file     Gets together: Not on file     Attends Adventism service: Not on file     Active member of club or organization: Not on file     Attends meetings of clubs or organizations: Not on file     Relationship status: Not on file   Other Topics Concern    Are you pregnant or think you may be? No    Breast-feeding No   Social History Narrative    Not on file       Review of Systems - Negative except   Respiratory ROS: no dyspnea  Cardiovascular ROS: no exertional chest pain  Gastrointestinal ROS: NO abdominal discomfort,  NO rectal bleeding  Musculoskeletal ROS: no muscular pain  Neurological ROS: no recent stroke    Physical Exam:  BP (!) 142/70   Pulse 73   Temp 97.2 °F (36.2 °C)   Resp 12   Ht 5' 7" (1.702 m)   Wt 83.9 kg (185 lb)   SpO2 100%   Breastfeeding? No   BMI 28.98 kg/m²   General: no distress  Head: normocephalic  Mallampati Score   Neck: supple, symmetrical, trachea midline  Lungs:  clear to auscultation bilaterally and normal respiratory effort  Heart: regular rate and rhythm and no murmur  Abdomen: soft, non-tender non-distented; bowel sounds normal; no masses,  no organomegaly  Extremities: no cyanosis or edema, or clubbing    ASA:  II    PLAN  COLONOSCOPY.    SedationPlan :MAC    The details of the procedure, the possible need for biopsy or polypectomy and the potential risks including bleeding, perforation, missed polyps were discussed in detail.    "

## 2020-02-24 NOTE — DISCHARGE INSTRUCTIONS
Colonoscopy     A camera attached to a flexible tube with a viewing lens is used to take video pictures.     Colonoscopy is a test to view the inside of your lower digestive tract (colon and rectum). Sometimes it can show the last part of the small intestine (ileum). During the test, small pieces of tissue may be removed for testing. This is called a biopsy. Small growths, such as polyps, may also be removed.   Why is colonoscopy done?  The test is done to help look for colon cancer. And it can help find the source of abdominal pain, bleeding, and changes in bowel habits. It may be needed once a year, depending on factors such as your:  · Age  · Health history  · Family health history  · Symptoms  · Results from any prior colonoscopy  Risks and possible complications  These include:  · Bleeding               · A puncture or tear in the colon   · Risks of anesthesia  · A cancer lesion not being seen  Getting ready   To prepare for the test:  · Talk with your healthcare provider about the risks of the test (see below). Also ask your healthcare provider about alternatives to the test.  · Tell your healthcare provider about any medicines you take. Also tell him or her about any health conditions you may have.  · Make sure your rectum and colon are empty for the test. Follow the diet and bowel prep instructions exactly. If you dont, the test may need to be rescheduled.  · Plan for a friend or family member to drive you home after the test.     Colonoscopy provides an inside view of the entire colon.     You may discuss the results with your doctor right away or at a future visit.  During the test   The test is usually done in the hospital on an outpatient basis. This means you go home the same day. The procedure takes about 30 minutes. During that time:  · You are given relaxing (sedating) medicine through an IV line. You may be drowsy, or fully asleep.  · The healthcare provider will first give you a physical exam to  check for anal and rectal problems.  · Then the anus is lubricated and the scope inserted.  · If you are awake, you may have a feeling similar to needing to have a bowel movement. You may also feel pressure as air is pumped into the colon. Its OK to pass gas during the procedure.  · Biopsy, polyp removal, or other treatments may be done during the test.  After the test   You may have gas right after the test. It can help to try to pass it to help prevent later bloating. Your healthcare provider may discuss the results with you right away. Or you may need to schedule a follow-up visit to talk about the results. After the test, you can go back to your normal eating and other activities. You may be tired from the sedation and need to rest for a few hours.  Date Last Reviewed: 11/1/2016 © 2000-2017 [employer]. [address] [place] [address], [place] [address]. All rights reserved. This information is not intended as a substitute for professional medical care. Always follow your healthcare professional's instructions.

## 2020-03-11 ENCOUNTER — PATIENT MESSAGE (OUTPATIENT)
Dept: SURGERY | Facility: CLINIC | Age: 70
End: 2020-03-11

## 2020-03-12 ENCOUNTER — TELEPHONE (OUTPATIENT)
Dept: SURGERY | Facility: CLINIC | Age: 70
End: 2020-03-12

## 2020-03-12 ENCOUNTER — PATIENT MESSAGE (OUTPATIENT)
Dept: INTERNAL MEDICINE | Facility: CLINIC | Age: 70
End: 2020-03-12

## 2020-03-17 ENCOUNTER — PATIENT MESSAGE (OUTPATIENT)
Dept: INTERNAL MEDICINE | Facility: CLINIC | Age: 70
End: 2020-03-17

## 2020-03-19 ENCOUNTER — TELEPHONE (OUTPATIENT)
Dept: SURGERY | Facility: CLINIC | Age: 70
End: 2020-03-19

## 2020-03-19 NOTE — TELEPHONE ENCOUNTER
----- Message from Ruebn Gonzalez RN sent at 3/19/2020  4:26 PM CDT -----      ----- Message -----  From: Ruben Sánchez MA  Sent: 3/19/2020   3:50 PM CDT  To: Jon Zhu Staff    Please advise patient pathology results from colonoscopy.    I don't see any results back and study mentions collected.    Thanks dr de la cruz staff/ ruben sánchez

## 2020-06-19 ENCOUNTER — LAB VISIT (OUTPATIENT)
Dept: PRIMARY CARE CLINIC | Facility: OTHER | Age: 70
End: 2020-06-19
Payer: MEDICARE

## 2020-06-19 DIAGNOSIS — R05.9 COUGH: Primary | ICD-10-CM

## 2020-06-19 PROCEDURE — U0003 INFECTIOUS AGENT DETECTION BY NUCLEIC ACID (DNA OR RNA); SEVERE ACUTE RESPIRATORY SYNDROME CORONAVIRUS 2 (SARS-COV-2) (CORONAVIRUS DISEASE [COVID-19]), AMPLIFIED PROBE TECHNIQUE, MAKING USE OF HIGH THROUGHPUT TECHNOLOGIES AS DESCRIBED BY CMS-2020-01-R: HCPCS | Mod: HCNC

## 2020-06-22 LAB — SARS-COV-2 RNA RESP QL NAA+PROBE: NOT DETECTED

## 2020-09-18 ENCOUNTER — HOSPITAL ENCOUNTER (OUTPATIENT)
Dept: RADIOLOGY | Facility: HOSPITAL | Age: 70
Discharge: HOME | End: 2020-09-18
Attending: INTERNAL MEDICINE
Payer: MEDICARE

## 2020-09-18 ENCOUNTER — NURSE TRIAGE (OUTPATIENT)
Dept: ADMINISTRATIVE | Facility: CLINIC | Age: 70
End: 2020-09-18

## 2020-09-18 ENCOUNTER — OFFICE VISIT (OUTPATIENT)
Dept: INTERNAL MEDICINE | Facility: CLINIC | Age: 70
End: 2020-09-18
Payer: MEDICARE

## 2020-09-18 VITALS
TEMPERATURE: 97 F | BODY MASS INDEX: 30.38 KG/M2 | HEART RATE: 80 BPM | HEIGHT: 67 IN | DIASTOLIC BLOOD PRESSURE: 78 MMHG | SYSTOLIC BLOOD PRESSURE: 110 MMHG | WEIGHT: 193.56 LBS

## 2020-09-18 DIAGNOSIS — M79.675 TOE PAIN, LEFT: ICD-10-CM

## 2020-09-18 DIAGNOSIS — M79.675 TOE PAIN, LEFT: Primary | ICD-10-CM

## 2020-09-18 PROCEDURE — 1101F PR PT FALLS ASSESS DOC 0-1 FALLS W/OUT INJ PAST YR: ICD-10-PCS | Mod: HCNC,CPTII,S$GLB, | Performed by: INTERNAL MEDICINE

## 2020-09-18 PROCEDURE — 1125F PR PAIN SEVERITY QUANTIFIED, PAIN PRESENT: ICD-10-PCS | Mod: HCNC,S$GLB,, | Performed by: INTERNAL MEDICINE

## 2020-09-18 PROCEDURE — 1159F PR MEDICATION LIST DOCUMENTED IN MEDICAL RECORD: ICD-10-PCS | Mod: HCNC,S$GLB,, | Performed by: INTERNAL MEDICINE

## 2020-09-18 PROCEDURE — 73630 XR FOOT COMPLETE 3 VIEW LEFT: ICD-10-PCS | Mod: 26,HCNC,LT, | Performed by: RADIOLOGY

## 2020-09-18 PROCEDURE — 1125F AMNT PAIN NOTED PAIN PRSNT: CPT | Mod: HCNC,S$GLB,, | Performed by: INTERNAL MEDICINE

## 2020-09-18 PROCEDURE — 3008F BODY MASS INDEX DOCD: CPT | Mod: HCNC,CPTII,S$GLB, | Performed by: INTERNAL MEDICINE

## 2020-09-18 PROCEDURE — 99213 OFFICE O/P EST LOW 20 MIN: CPT | Mod: HCNC,S$GLB,, | Performed by: INTERNAL MEDICINE

## 2020-09-18 PROCEDURE — 73630 X-RAY EXAM OF FOOT: CPT | Mod: TC,HCNC,PO,LT

## 2020-09-18 PROCEDURE — 99999 PR PBB SHADOW E&M-EST. PATIENT-LVL III: CPT | Mod: PBBFAC,HCNC,, | Performed by: INTERNAL MEDICINE

## 2020-09-18 PROCEDURE — 3008F PR BODY MASS INDEX (BMI) DOCUMENTED: ICD-10-PCS | Mod: HCNC,CPTII,S$GLB, | Performed by: INTERNAL MEDICINE

## 2020-09-18 PROCEDURE — 73630 X-RAY EXAM OF FOOT: CPT | Mod: 26,HCNC,LT, | Performed by: RADIOLOGY

## 2020-09-18 PROCEDURE — 99213 PR OFFICE/OUTPT VISIT, EST, LEVL III, 20-29 MIN: ICD-10-PCS | Mod: HCNC,S$GLB,, | Performed by: INTERNAL MEDICINE

## 2020-09-18 PROCEDURE — 99999 PR PBB SHADOW E&M-EST. PATIENT-LVL III: ICD-10-PCS | Mod: PBBFAC,HCNC,, | Performed by: INTERNAL MEDICINE

## 2020-09-18 PROCEDURE — 1159F MED LIST DOCD IN RCRD: CPT | Mod: HCNC,S$GLB,, | Performed by: INTERNAL MEDICINE

## 2020-09-18 PROCEDURE — 1101F PT FALLS ASSESS-DOCD LE1/YR: CPT | Mod: HCNC,CPTII,S$GLB, | Performed by: INTERNAL MEDICINE

## 2020-09-18 NOTE — TELEPHONE ENCOUNTER
Reason for Disposition   Looks like a broken bone (e.g., crooked or deformed)    Additional Information   Negative: [1] Major bleeding (e.g., spurting blood) AND [2] can't be stopped   Negative: Amputated toe   Negative: Skin is split open or gaping  (or length > 1/2 inch or 12 mm)   Negative: [1] Bleeding AND [2] won't stop after 10 minutes of direct pressure (using correct technique)   Negative: [1] Dirt in the wound AND [2] not removed with 15 minutes of scrubbing   Negative: Sounds like a serious injury to the triager   Negative: [1] SEVERE pain AND [2] not improved 2 hours after pain medicine/ice packs   Negative: [1] MODERATE-SEVERE pain AND [2] blood present under the toenail    Protocols used: ST TOE INJURY-A-AH    Left pinky toe broken when she hit it on a her chair  Yesterday. Moderately swollen, bruised, minor pain. She taped her pinky toe to her next toe. She verbalized understanding of care advice

## 2020-09-18 NOTE — PROGRESS NOTES
Subjective:       Patient ID: Charu Lopez is a 70 y.o. female.    Chief Complaint: broken toe possibly (left, outer)    HPI   Pt c/o left pinky toe pain which she hit on her chair eg yesterday. Mild swelling in the area with trace bruising. Some relief with NSAIDs.   Review of Systems   Constitutional: Negative for activity change, appetite change, chills, diaphoresis, fatigue, fever and unexpected weight change.   HENT: Negative for postnasal drip, rhinorrhea, sinus pressure/congestion, sneezing, sore throat, trouble swallowing and voice change.    Respiratory: Negative for cough, shortness of breath and wheezing.    Cardiovascular: Negative for chest pain, palpitations and leg swelling.   Gastrointestinal: Negative for abdominal pain, blood in stool, constipation, diarrhea, nausea and vomiting.   Genitourinary: Negative for dysuria.   Musculoskeletal: Positive for joint swelling. Negative for arthralgias and myalgias.   Integumentary:  Negative for rash and wound.   Allergic/Immunologic: Negative for environmental allergies and food allergies.   Hematological: Negative for adenopathy. Does not bruise/bleed easily.         Objective:      Physical Exam  Constitutional:       General: She is not in acute distress.     Appearance: She is well-developed. She is not diaphoretic.   HENT:      Head: Normocephalic and atraumatic.      Right Ear: External ear normal.      Left Ear: External ear normal.      Nose: Nose normal.      Mouth/Throat:      Pharynx: No oropharyngeal exudate.   Eyes:      General: No scleral icterus.        Right eye: No discharge.         Left eye: No discharge.      Conjunctiva/sclera: Conjunctivae normal.      Pupils: Pupils are equal, round, and reactive to light.   Neck:      Musculoskeletal: Neck supple.      Vascular: No JVD.   Cardiovascular:      Rate and Rhythm: Normal rate and regular rhythm.      Heart sounds: Normal heart sounds.   Pulmonary:      Effort: Pulmonary  effort is normal. No respiratory distress.      Breath sounds: Normal breath sounds. No wheezing or rales.   Musculoskeletal:        Feet:    Lymphadenopathy:      Cervical: No cervical adenopathy.   Skin:     General: Skin is warm and dry.      Coloration: Skin is not pale.      Findings: No rash.   Neurological:      Mental Status: She is alert and oriented to person, place, and time.         Assessment:       1. Toe pain, left        Plan:    1. X-ray to r/o fracture

## 2020-09-27 ENCOUNTER — PATIENT MESSAGE (OUTPATIENT)
Dept: INTERNAL MEDICINE | Facility: CLINIC | Age: 70
End: 2020-09-27

## 2020-12-31 ENCOUNTER — PATIENT MESSAGE (OUTPATIENT)
Dept: INTERNAL MEDICINE | Facility: CLINIC | Age: 70
End: 2020-12-31

## 2020-12-31 DIAGNOSIS — Z12.31 ENCOUNTER FOR SCREENING MAMMOGRAM FOR BREAST CANCER: Primary | ICD-10-CM

## 2021-01-08 ENCOUNTER — PATIENT MESSAGE (OUTPATIENT)
Dept: INTERNAL MEDICINE | Facility: CLINIC | Age: 71
End: 2021-01-08

## 2021-01-12 ENCOUNTER — LAB VISIT (OUTPATIENT)
Dept: PRIMARY CARE CLINIC | Facility: OTHER | Age: 71
End: 2021-01-12
Attending: INTERNAL MEDICINE
Payer: MEDICARE

## 2021-01-12 DIAGNOSIS — J34.89 STUFFY AND RUNNY NOSE: ICD-10-CM

## 2021-01-12 PROCEDURE — U0003 INFECTIOUS AGENT DETECTION BY NUCLEIC ACID (DNA OR RNA); SEVERE ACUTE RESPIRATORY SYNDROME CORONAVIRUS 2 (SARS-COV-2) (CORONAVIRUS DISEASE [COVID-19]), AMPLIFIED PROBE TECHNIQUE, MAKING USE OF HIGH THROUGHPUT TECHNOLOGIES AS DESCRIBED BY CMS-2020-01-R: HCPCS

## 2021-01-13 LAB — SARS-COV-2 RNA RESP QL NAA+PROBE: NOT DETECTED

## 2021-02-09 ENCOUNTER — PATIENT MESSAGE (OUTPATIENT)
Dept: INTERNAL MEDICINE | Facility: CLINIC | Age: 71
End: 2021-02-09

## 2021-02-18 ENCOUNTER — PATIENT MESSAGE (OUTPATIENT)
Dept: INTERNAL MEDICINE | Facility: CLINIC | Age: 71
End: 2021-02-18

## 2021-02-19 ENCOUNTER — PATIENT MESSAGE (OUTPATIENT)
Dept: INTERNAL MEDICINE | Facility: CLINIC | Age: 71
End: 2021-02-19

## 2021-02-20 ENCOUNTER — IMMUNIZATION (OUTPATIENT)
Dept: PRIMARY CARE CLINIC | Facility: CLINIC | Age: 71
End: 2021-02-20
Payer: MEDICARE

## 2021-02-20 DIAGNOSIS — Z23 NEED FOR VACCINATION: Primary | ICD-10-CM

## 2021-02-20 PROCEDURE — 91300 PR SARS-COV- 2 COVID-19 VACCINE, NO PRSV, 30MCG/0.3ML, IM: CPT | Mod: S$GLB,,, | Performed by: INTERNAL MEDICINE

## 2021-02-20 PROCEDURE — 91300 PR SARS-COV- 2 COVID-19 VACCINE, NO PRSV, 30MCG/0.3ML, IM: ICD-10-PCS | Mod: S$GLB,,, | Performed by: INTERNAL MEDICINE

## 2021-02-20 PROCEDURE — 0001A PR IMMUNIZ ADMIN, SARS-COV-2 COVID-19 VACC, 30MCG/0.3ML, 1ST DOSE: ICD-10-PCS | Mod: CV19,S$GLB,, | Performed by: INTERNAL MEDICINE

## 2021-02-20 PROCEDURE — 0001A PR IMMUNIZ ADMIN, SARS-COV-2 COVID-19 VACC, 30MCG/0.3ML, 1ST DOSE: CPT | Mod: CV19,S$GLB,, | Performed by: INTERNAL MEDICINE

## 2021-02-20 RX ADMIN — Medication 0.3 ML: at 02:02

## 2021-03-02 ENCOUNTER — PES CALL (OUTPATIENT)
Dept: ADMINISTRATIVE | Facility: CLINIC | Age: 71
End: 2021-03-02

## 2021-03-13 ENCOUNTER — IMMUNIZATION (OUTPATIENT)
Dept: PRIMARY CARE CLINIC | Facility: CLINIC | Age: 71
End: 2021-03-13
Payer: MEDICARE

## 2021-03-13 DIAGNOSIS — Z23 NEED FOR VACCINATION: Primary | ICD-10-CM

## 2021-03-13 PROCEDURE — 91300 PR SARS-COV- 2 COVID-19 VACCINE, NO PRSV, 30MCG/0.3ML, IM: CPT | Mod: S$GLB,,, | Performed by: INTERNAL MEDICINE

## 2021-03-13 PROCEDURE — 91300 PR SARS-COV- 2 COVID-19 VACCINE, NO PRSV, 30MCG/0.3ML, IM: ICD-10-PCS | Mod: S$GLB,,, | Performed by: INTERNAL MEDICINE

## 2021-03-13 PROCEDURE — 0002A PR IMMUNIZ ADMIN, SARS-COV-2 COVID-19 VACC, 30MCG/0.3ML, 2ND DOSE: ICD-10-PCS | Mod: CV19,S$GLB,, | Performed by: INTERNAL MEDICINE

## 2021-03-13 PROCEDURE — 0002A PR IMMUNIZ ADMIN, SARS-COV-2 COVID-19 VACC, 30MCG/0.3ML, 2ND DOSE: CPT | Mod: CV19,S$GLB,, | Performed by: INTERNAL MEDICINE

## 2021-03-13 RX ADMIN — Medication 0.3 ML: at 02:03

## 2021-03-14 ENCOUNTER — PATIENT MESSAGE (OUTPATIENT)
Dept: INTERNAL MEDICINE | Facility: CLINIC | Age: 71
End: 2021-03-14

## 2021-03-14 DIAGNOSIS — K62.5 BRBPR (BRIGHT RED BLOOD PER RECTUM): Primary | ICD-10-CM

## 2021-03-15 ENCOUNTER — PATIENT MESSAGE (OUTPATIENT)
Dept: INTERNAL MEDICINE | Facility: CLINIC | Age: 71
End: 2021-03-15

## 2021-03-16 ENCOUNTER — PATIENT MESSAGE (OUTPATIENT)
Dept: INTERNAL MEDICINE | Facility: CLINIC | Age: 71
End: 2021-03-16

## 2021-03-16 ENCOUNTER — TELEPHONE (OUTPATIENT)
Dept: INTERNAL MEDICINE | Facility: CLINIC | Age: 71
End: 2021-03-16

## 2021-03-17 ENCOUNTER — TELEPHONE (OUTPATIENT)
Dept: SURGERY | Facility: CLINIC | Age: 71
End: 2021-03-17

## 2021-03-18 ENCOUNTER — PATIENT OUTREACH (OUTPATIENT)
Dept: ADMINISTRATIVE | Facility: OTHER | Age: 71
End: 2021-03-18

## 2021-03-19 ENCOUNTER — OFFICE VISIT (OUTPATIENT)
Dept: SURGERY | Facility: CLINIC | Age: 71
End: 2021-03-19
Attending: COLON & RECTAL SURGERY
Payer: MEDICARE

## 2021-03-19 VITALS
HEIGHT: 67 IN | SYSTOLIC BLOOD PRESSURE: 147 MMHG | DIASTOLIC BLOOD PRESSURE: 70 MMHG | HEART RATE: 82 BPM | WEIGHT: 194 LBS | BODY MASS INDEX: 30.45 KG/M2

## 2021-03-19 DIAGNOSIS — K62.5 BRBPR (BRIGHT RED BLOOD PER RECTUM): ICD-10-CM

## 2021-03-19 PROCEDURE — 3008F BODY MASS INDEX DOCD: CPT | Mod: CPTII,S$GLB,, | Performed by: COLON & RECTAL SURGERY

## 2021-03-19 PROCEDURE — 1125F PR PAIN SEVERITY QUANTIFIED, PAIN PRESENT: ICD-10-PCS | Mod: S$GLB,,, | Performed by: COLON & RECTAL SURGERY

## 2021-03-19 PROCEDURE — 1159F MED LIST DOCD IN RCRD: CPT | Mod: S$GLB,,, | Performed by: COLON & RECTAL SURGERY

## 2021-03-19 PROCEDURE — 1157F ADVNC CARE PLAN IN RCRD: CPT | Mod: S$GLB,,, | Performed by: COLON & RECTAL SURGERY

## 2021-03-19 PROCEDURE — 1101F PT FALLS ASSESS-DOCD LE1/YR: CPT | Mod: CPTII,S$GLB,, | Performed by: COLON & RECTAL SURGERY

## 2021-03-19 PROCEDURE — 99999 PR PBB SHADOW E&M-EST. PATIENT-LVL IV: ICD-10-PCS | Mod: PBBFAC,,, | Performed by: COLON & RECTAL SURGERY

## 2021-03-19 PROCEDURE — 1101F PR PT FALLS ASSESS DOC 0-1 FALLS W/OUT INJ PAST YR: ICD-10-PCS | Mod: CPTII,S$GLB,, | Performed by: COLON & RECTAL SURGERY

## 2021-03-19 PROCEDURE — 99213 PR OFFICE/OUTPT VISIT, EST, LEVL III, 20-29 MIN: ICD-10-PCS | Mod: S$GLB,,, | Performed by: COLON & RECTAL SURGERY

## 2021-03-19 PROCEDURE — 99213 OFFICE O/P EST LOW 20 MIN: CPT | Mod: S$GLB,,, | Performed by: COLON & RECTAL SURGERY

## 2021-03-19 PROCEDURE — 99999 PR PBB SHADOW E&M-EST. PATIENT-LVL IV: CPT | Mod: PBBFAC,,, | Performed by: COLON & RECTAL SURGERY

## 2021-03-19 PROCEDURE — 3008F PR BODY MASS INDEX (BMI) DOCUMENTED: ICD-10-PCS | Mod: CPTII,S$GLB,, | Performed by: COLON & RECTAL SURGERY

## 2021-03-19 PROCEDURE — 1157F PR ADVANCE CARE PLAN OR EQUIV PRESENT IN MEDICAL RECORD: ICD-10-PCS | Mod: S$GLB,,, | Performed by: COLON & RECTAL SURGERY

## 2021-03-19 PROCEDURE — 1159F PR MEDICATION LIST DOCUMENTED IN MEDICAL RECORD: ICD-10-PCS | Mod: S$GLB,,, | Performed by: COLON & RECTAL SURGERY

## 2021-03-19 PROCEDURE — 3288F FALL RISK ASSESSMENT DOCD: CPT | Mod: CPTII,S$GLB,, | Performed by: COLON & RECTAL SURGERY

## 2021-03-19 PROCEDURE — 3288F PR FALLS RISK ASSESSMENT DOCUMENTED: ICD-10-PCS | Mod: CPTII,S$GLB,, | Performed by: COLON & RECTAL SURGERY

## 2021-03-19 PROCEDURE — 1125F AMNT PAIN NOTED PAIN PRSNT: CPT | Mod: S$GLB,,, | Performed by: COLON & RECTAL SURGERY

## 2021-04-05 ENCOUNTER — PATIENT MESSAGE (OUTPATIENT)
Dept: ADMINISTRATIVE | Facility: HOSPITAL | Age: 71
End: 2021-04-05

## 2021-04-05 ENCOUNTER — PES CALL (OUTPATIENT)
Dept: ADMINISTRATIVE | Facility: CLINIC | Age: 71
End: 2021-04-05

## 2021-04-20 ENCOUNTER — PES CALL (OUTPATIENT)
Dept: ADMINISTRATIVE | Facility: CLINIC | Age: 71
End: 2021-04-20

## 2021-05-23 ENCOUNTER — OFFICE VISIT (OUTPATIENT)
Dept: URGENT CARE | Facility: CLINIC | Age: 71
End: 2021-05-23
Payer: MEDICARE

## 2021-05-23 VITALS
BODY MASS INDEX: 31.34 KG/M2 | HEART RATE: 82 BPM | SYSTOLIC BLOOD PRESSURE: 155 MMHG | WEIGHT: 195 LBS | OXYGEN SATURATION: 97 % | TEMPERATURE: 98 F | HEIGHT: 66 IN | DIASTOLIC BLOOD PRESSURE: 80 MMHG

## 2021-05-23 DIAGNOSIS — L30.9 ACUTE DERMATITIS: Primary | ICD-10-CM

## 2021-05-23 DIAGNOSIS — Z91.09 ALLERGY TO MITES: ICD-10-CM

## 2021-05-23 PROCEDURE — 1157F ADVNC CARE PLAN IN RCRD: CPT | Mod: S$GLB,,, | Performed by: INTERNAL MEDICINE

## 2021-05-23 PROCEDURE — 3008F PR BODY MASS INDEX (BMI) DOCUMENTED: ICD-10-PCS | Mod: CPTII,S$GLB,, | Performed by: INTERNAL MEDICINE

## 2021-05-23 PROCEDURE — 99214 OFFICE O/P EST MOD 30 MIN: CPT | Mod: S$GLB,,, | Performed by: INTERNAL MEDICINE

## 2021-05-23 PROCEDURE — 1157F PR ADVANCE CARE PLAN OR EQUIV PRESENT IN MEDICAL RECORD: ICD-10-PCS | Mod: S$GLB,,, | Performed by: INTERNAL MEDICINE

## 2021-05-23 PROCEDURE — 99214 PR OFFICE/OUTPT VISIT, EST, LEVL IV, 30-39 MIN: ICD-10-PCS | Mod: S$GLB,,, | Performed by: INTERNAL MEDICINE

## 2021-05-23 PROCEDURE — 3008F BODY MASS INDEX DOCD: CPT | Mod: CPTII,S$GLB,, | Performed by: INTERNAL MEDICINE

## 2021-05-23 RX ORDER — PERMETHRIN 50 MG/G
CREAM TOPICAL ONCE
Qty: 60 G | Refills: 1 | Status: SHIPPED | OUTPATIENT
Start: 2021-05-23 | End: 2021-05-23

## 2021-06-02 ENCOUNTER — PES CALL (OUTPATIENT)
Dept: ADMINISTRATIVE | Facility: CLINIC | Age: 71
End: 2021-06-02

## 2021-06-30 ENCOUNTER — OFFICE VISIT (OUTPATIENT)
Dept: INTERNAL MEDICINE | Facility: CLINIC | Age: 71
End: 2021-06-30
Payer: MEDICARE

## 2021-06-30 VITALS
SYSTOLIC BLOOD PRESSURE: 102 MMHG | HEIGHT: 68 IN | RESPIRATION RATE: 15 BRPM | WEIGHT: 187 LBS | BODY MASS INDEX: 28.34 KG/M2 | DIASTOLIC BLOOD PRESSURE: 60 MMHG | OXYGEN SATURATION: 97 % | HEART RATE: 85 BPM

## 2021-06-30 DIAGNOSIS — J84.10 CALCIFIED GRANULOMA OF LUNG: ICD-10-CM

## 2021-06-30 DIAGNOSIS — Z13.220 SCREENING FOR HYPERLIPIDEMIA: ICD-10-CM

## 2021-06-30 DIAGNOSIS — E66.3 OVERWEIGHT (BMI 25.0-29.9): ICD-10-CM

## 2021-06-30 DIAGNOSIS — R35.0 URINARY FREQUENCY: ICD-10-CM

## 2021-06-30 DIAGNOSIS — R94.4 DECREASED CALCULATED GLOMERULAR FILTRATION RATE (GFR): ICD-10-CM

## 2021-06-30 DIAGNOSIS — M85.859 OSTEOPENIA OF HIP, UNSPECIFIED LATERALITY: ICD-10-CM

## 2021-06-30 DIAGNOSIS — D72.819 LEUKOPENIA, UNSPECIFIED TYPE: ICD-10-CM

## 2021-06-30 DIAGNOSIS — Z78.0 MENOPAUSE: ICD-10-CM

## 2021-06-30 DIAGNOSIS — M15.9 OSTEOARTHRITIS OF MULTIPLE JOINTS, UNSPECIFIED OSTEOARTHRITIS TYPE: ICD-10-CM

## 2021-06-30 DIAGNOSIS — H25.9 AGE-RELATED CATARACT OF BOTH EYES, UNSPECIFIED AGE-RELATED CATARACT TYPE: ICD-10-CM

## 2021-06-30 DIAGNOSIS — R19.5 POSITIVE FIT (FECAL IMMUNOCHEMICAL TEST): ICD-10-CM

## 2021-06-30 DIAGNOSIS — Z80.0 FAMILY HISTORY OF COLON CANCER: ICD-10-CM

## 2021-06-30 DIAGNOSIS — Z13.5 SCREENING FOR EYE CONDITION: ICD-10-CM

## 2021-06-30 DIAGNOSIS — Z00.00 ENCOUNTER FOR PREVENTIVE HEALTH EXAMINATION: Primary | ICD-10-CM

## 2021-06-30 DIAGNOSIS — R53.83 FATIGUE, UNSPECIFIED TYPE: ICD-10-CM

## 2021-06-30 DIAGNOSIS — R63.4 WEIGHT LOSS: ICD-10-CM

## 2021-06-30 DIAGNOSIS — Z86.39 H/O VITAMIN D DEFICIENCY: ICD-10-CM

## 2021-06-30 PROCEDURE — G0439 PR MEDICARE ANNUAL WELLNESS SUBSEQUENT VISIT: ICD-10-PCS | Mod: S$GLB,,, | Performed by: NURSE PRACTITIONER

## 2021-06-30 PROCEDURE — 3008F BODY MASS INDEX DOCD: CPT | Mod: CPTII,S$GLB,, | Performed by: NURSE PRACTITIONER

## 2021-06-30 PROCEDURE — 3288F FALL RISK ASSESSMENT DOCD: CPT | Mod: CPTII,S$GLB,, | Performed by: NURSE PRACTITIONER

## 2021-06-30 PROCEDURE — 1123F ACP DISCUSS/DSCN MKR DOCD: CPT | Mod: S$GLB,,, | Performed by: NURSE PRACTITIONER

## 2021-06-30 PROCEDURE — 3008F PR BODY MASS INDEX (BMI) DOCUMENTED: ICD-10-PCS | Mod: CPTII,S$GLB,, | Performed by: NURSE PRACTITIONER

## 2021-06-30 PROCEDURE — 99999 PR PBB SHADOW E&M-EST. PATIENT-LVL V: ICD-10-PCS | Mod: PBBFAC,,, | Performed by: NURSE PRACTITIONER

## 2021-06-30 PROCEDURE — 99999 PR PBB SHADOW E&M-EST. PATIENT-LVL V: CPT | Mod: PBBFAC,,, | Performed by: NURSE PRACTITIONER

## 2021-06-30 PROCEDURE — 99499 RISK ADDL DX/OHS AUDIT: ICD-10-PCS | Mod: S$GLB,,, | Performed by: NURSE PRACTITIONER

## 2021-06-30 PROCEDURE — 1123F PR ADV CARE PLAN DISCUSSED, PLAN OR SURROGATE DOCUMENTED: ICD-10-PCS | Mod: S$GLB,,, | Performed by: NURSE PRACTITIONER

## 2021-06-30 PROCEDURE — 1101F PR PT FALLS ASSESS DOC 0-1 FALLS W/OUT INJ PAST YR: ICD-10-PCS | Mod: CPTII,S$GLB,, | Performed by: NURSE PRACTITIONER

## 2021-06-30 PROCEDURE — 1126F AMNT PAIN NOTED NONE PRSNT: CPT | Mod: S$GLB,,, | Performed by: NURSE PRACTITIONER

## 2021-06-30 PROCEDURE — G0439 PPPS, SUBSEQ VISIT: HCPCS | Mod: S$GLB,,, | Performed by: NURSE PRACTITIONER

## 2021-06-30 PROCEDURE — 99499 UNLISTED E&M SERVICE: CPT | Mod: S$GLB,,, | Performed by: NURSE PRACTITIONER

## 2021-06-30 PROCEDURE — 1101F PT FALLS ASSESS-DOCD LE1/YR: CPT | Mod: CPTII,S$GLB,, | Performed by: NURSE PRACTITIONER

## 2021-06-30 PROCEDURE — 3288F PR FALLS RISK ASSESSMENT DOCUMENTED: ICD-10-PCS | Mod: CPTII,S$GLB,, | Performed by: NURSE PRACTITIONER

## 2021-06-30 PROCEDURE — 1126F PR PAIN SEVERITY QUANTIFIED, NO PAIN PRESENT: ICD-10-PCS | Mod: S$GLB,,, | Performed by: NURSE PRACTITIONER

## 2021-08-06 ENCOUNTER — APPOINTMENT (OUTPATIENT)
Dept: RADIOLOGY | Facility: CLINIC | Age: 71
End: 2021-08-06
Attending: NURSE PRACTITIONER
Payer: MEDICARE

## 2021-08-06 DIAGNOSIS — M85.859 OSTEOPENIA OF HIP, UNSPECIFIED LATERALITY: ICD-10-CM

## 2021-08-06 DIAGNOSIS — Z78.0 MENOPAUSE: ICD-10-CM

## 2021-08-06 PROCEDURE — 77080 DEXA BONE DENSITY SPINE HIP: ICD-10-PCS | Mod: 26,,, | Performed by: INTERNAL MEDICINE

## 2021-08-06 PROCEDURE — 77080 DXA BONE DENSITY AXIAL: CPT | Mod: 26,,, | Performed by: INTERNAL MEDICINE

## 2021-08-06 PROCEDURE — 77080 DXA BONE DENSITY AXIAL: CPT | Mod: TC,PO

## 2021-08-26 ENCOUNTER — LAB VISIT (OUTPATIENT)
Dept: LAB | Facility: HOSPITAL | Age: 71
End: 2021-08-26
Attending: INTERNAL MEDICINE
Payer: MEDICARE

## 2021-08-26 DIAGNOSIS — M85.859 OSTEOPENIA OF HIP, UNSPECIFIED LATERALITY: ICD-10-CM

## 2021-08-26 DIAGNOSIS — R19.5 POSITIVE FIT (FECAL IMMUNOCHEMICAL TEST): ICD-10-CM

## 2021-08-26 DIAGNOSIS — E66.3 OVERWEIGHT (BMI 25.0-29.9): ICD-10-CM

## 2021-08-26 DIAGNOSIS — Z13.220 SCREENING FOR HYPERLIPIDEMIA: ICD-10-CM

## 2021-08-26 DIAGNOSIS — D72.819 LEUKOPENIA, UNSPECIFIED TYPE: ICD-10-CM

## 2021-08-26 DIAGNOSIS — R53.83 FATIGUE, UNSPECIFIED TYPE: ICD-10-CM

## 2021-08-26 DIAGNOSIS — R94.4 DECREASED CALCULATED GLOMERULAR FILTRATION RATE (GFR): ICD-10-CM

## 2021-08-26 DIAGNOSIS — R63.4 WEIGHT LOSS: ICD-10-CM

## 2021-08-26 LAB
25(OH)D3+25(OH)D2 SERPL-MCNC: 30 NG/ML (ref 30–96)
ALBUMIN SERPL BCP-MCNC: 3.7 G/DL (ref 3.5–5.2)
ALP SERPL-CCNC: 94 U/L (ref 55–135)
ALT SERPL W/O P-5'-P-CCNC: 20 U/L (ref 10–44)
ANION GAP SERPL CALC-SCNC: 11 MMOL/L (ref 8–16)
AST SERPL-CCNC: 21 U/L (ref 10–40)
BASOPHILS # BLD AUTO: 0.05 K/UL (ref 0–0.2)
BASOPHILS NFR BLD: 1 % (ref 0–1.9)
BILIRUB SERPL-MCNC: 0.8 MG/DL (ref 0.1–1)
BUN SERPL-MCNC: 13 MG/DL (ref 8–23)
CALCIUM SERPL-MCNC: 9.5 MG/DL (ref 8.7–10.5)
CHLORIDE SERPL-SCNC: 105 MMOL/L (ref 95–110)
CHOLEST SERPL-MCNC: 105 MG/DL (ref 120–199)
CHOLEST/HDLC SERPL: 1.7 {RATIO} (ref 2–5)
CO2 SERPL-SCNC: 22 MMOL/L (ref 23–29)
CREAT SERPL-MCNC: 0.8 MG/DL (ref 0.5–1.4)
DIFFERENTIAL METHOD: ABNORMAL
EOSINOPHIL # BLD AUTO: 0.2 K/UL (ref 0–0.5)
EOSINOPHIL NFR BLD: 4.3 % (ref 0–8)
ERYTHROCYTE [DISTWIDTH] IN BLOOD BY AUTOMATED COUNT: 13 % (ref 11.5–14.5)
EST. GFR  (AFRICAN AMERICAN): >60 ML/MIN/1.73 M^2
EST. GFR  (NON AFRICAN AMERICAN): >60 ML/MIN/1.73 M^2
GLUCOSE SERPL-MCNC: 91 MG/DL (ref 70–110)
HCT VFR BLD AUTO: 44.8 % (ref 37–48.5)
HDLC SERPL-MCNC: 61 MG/DL (ref 40–75)
HDLC SERPL: 58.1 % (ref 20–50)
HGB BLD-MCNC: 14.9 G/DL (ref 12–16)
IMM GRANULOCYTES # BLD AUTO: 0 K/UL (ref 0–0.04)
IMM GRANULOCYTES NFR BLD AUTO: 0 % (ref 0–0.5)
LDLC SERPL CALC-MCNC: 38 MG/DL (ref 63–159)
LYMPHOCYTES # BLD AUTO: 1.6 K/UL (ref 1–4.8)
LYMPHOCYTES NFR BLD: 33.1 % (ref 18–48)
MCH RBC QN AUTO: 31.5 PG (ref 27–31)
MCHC RBC AUTO-ENTMCNC: 33.3 G/DL (ref 32–36)
MCV RBC AUTO: 95 FL (ref 82–98)
MONOCYTES # BLD AUTO: 0.6 K/UL (ref 0.3–1)
MONOCYTES NFR BLD: 12.8 % (ref 4–15)
NEUTROPHILS # BLD AUTO: 2.4 K/UL (ref 1.8–7.7)
NEUTROPHILS NFR BLD: 48.8 % (ref 38–73)
NONHDLC SERPL-MCNC: 44 MG/DL
NRBC BLD-RTO: 0 /100 WBC
PLATELET # BLD AUTO: 234 K/UL (ref 150–450)
PMV BLD AUTO: 11.3 FL (ref 9.2–12.9)
POTASSIUM SERPL-SCNC: 4 MMOL/L (ref 3.5–5.1)
PROT SERPL-MCNC: 6.9 G/DL (ref 6–8.4)
RBC # BLD AUTO: 4.73 M/UL (ref 4–5.4)
SODIUM SERPL-SCNC: 138 MMOL/L (ref 136–145)
TRIGL SERPL-MCNC: 30 MG/DL (ref 30–150)
TSH SERPL DL<=0.005 MIU/L-ACNC: 1.42 UIU/ML (ref 0.4–4)
WBC # BLD AUTO: 4.92 K/UL (ref 3.9–12.7)

## 2021-08-26 PROCEDURE — 80053 COMPREHEN METABOLIC PANEL: CPT | Performed by: NURSE PRACTITIONER

## 2021-08-26 PROCEDURE — 80061 LIPID PANEL: CPT | Performed by: NURSE PRACTITIONER

## 2021-08-26 PROCEDURE — 82306 VITAMIN D 25 HYDROXY: CPT | Performed by: NURSE PRACTITIONER

## 2021-08-26 PROCEDURE — 84443 ASSAY THYROID STIM HORMONE: CPT | Performed by: NURSE PRACTITIONER

## 2021-08-26 PROCEDURE — 36415 COLL VENOUS BLD VENIPUNCTURE: CPT | Mod: PO | Performed by: NURSE PRACTITIONER

## 2021-08-26 PROCEDURE — 85025 COMPLETE CBC W/AUTO DIFF WBC: CPT | Performed by: NURSE PRACTITIONER

## 2021-09-08 ENCOUNTER — TELEPHONE (OUTPATIENT)
Dept: INTERNAL MEDICINE | Facility: CLINIC | Age: 71
End: 2021-09-08

## 2021-10-08 ENCOUNTER — PATIENT MESSAGE (OUTPATIENT)
Dept: INTERNAL MEDICINE | Facility: CLINIC | Age: 71
End: 2021-10-08

## 2021-10-12 ENCOUNTER — PATIENT OUTREACH (OUTPATIENT)
Dept: ADMINISTRATIVE | Facility: OTHER | Age: 71
End: 2021-10-12

## 2021-10-13 ENCOUNTER — OFFICE VISIT (OUTPATIENT)
Dept: UROGYNECOLOGY | Facility: CLINIC | Age: 71
End: 2021-10-13
Payer: MEDICARE

## 2021-10-13 VITALS
WEIGHT: 193.31 LBS | SYSTOLIC BLOOD PRESSURE: 173 MMHG | HEIGHT: 68 IN | DIASTOLIC BLOOD PRESSURE: 78 MMHG | BODY MASS INDEX: 29.3 KG/M2

## 2021-10-13 DIAGNOSIS — Z12.4 CERVICAL CANCER SCREENING: Primary | ICD-10-CM

## 2021-10-13 DIAGNOSIS — N81.6 RECTOCELE, FEMALE: ICD-10-CM

## 2021-10-13 DIAGNOSIS — R19.8 STRAINING WITH STOOLS: ICD-10-CM

## 2021-10-13 DIAGNOSIS — N81.11 CYSTOCELE, MIDLINE: ICD-10-CM

## 2021-10-13 DIAGNOSIS — K59.09 CHRONIC CONSTIPATION: ICD-10-CM

## 2021-10-13 DIAGNOSIS — N81.4 UTEROVAGINAL PROLAPSE: ICD-10-CM

## 2021-10-13 DIAGNOSIS — R30.0 DYSURIA: ICD-10-CM

## 2021-10-13 PROCEDURE — 1159F PR MEDICATION LIST DOCUMENTED IN MEDICAL RECORD: ICD-10-PCS | Mod: HCNC,CPTII,S$GLB, | Performed by: OBSTETRICS & GYNECOLOGY

## 2021-10-13 PROCEDURE — 1126F PR PAIN SEVERITY QUANTIFIED, NO PAIN PRESENT: ICD-10-PCS | Mod: HCNC,CPTII,S$GLB, | Performed by: OBSTETRICS & GYNECOLOGY

## 2021-10-13 PROCEDURE — 51701 PR INSERTION OF NON-INDWELLING BLADDER CATHETERIZATION FOR RESIDUAL UR: ICD-10-PCS | Mod: HCNC,S$GLB,, | Performed by: OBSTETRICS & GYNECOLOGY

## 2021-10-13 PROCEDURE — 87186 SC STD MICRODIL/AGAR DIL: CPT | Mod: HCNC | Performed by: OBSTETRICS & GYNECOLOGY

## 2021-10-13 PROCEDURE — 1160F PR REVIEW ALL MEDS BY PRESCRIBER/CLIN PHARMACIST DOCUMENTED: ICD-10-PCS | Mod: HCNC,CPTII,S$GLB, | Performed by: OBSTETRICS & GYNECOLOGY

## 2021-10-13 PROCEDURE — 99999 PR PBB SHADOW E&M-EST. PATIENT-LVL IV: ICD-10-PCS | Mod: PBBFAC,HCNC,, | Performed by: OBSTETRICS & GYNECOLOGY

## 2021-10-13 PROCEDURE — 99204 OFFICE O/P NEW MOD 45 MIN: CPT | Mod: 25,HCNC,S$GLB, | Performed by: OBSTETRICS & GYNECOLOGY

## 2021-10-13 PROCEDURE — 87088 URINE BACTERIA CULTURE: CPT | Mod: HCNC | Performed by: OBSTETRICS & GYNECOLOGY

## 2021-10-13 PROCEDURE — 88175 CYTOPATH C/V AUTO FLUID REDO: CPT | Mod: HCNC | Performed by: OBSTETRICS & GYNECOLOGY

## 2021-10-13 PROCEDURE — 1126F AMNT PAIN NOTED NONE PRSNT: CPT | Mod: HCNC,CPTII,S$GLB, | Performed by: OBSTETRICS & GYNECOLOGY

## 2021-10-13 PROCEDURE — 99204 PR OFFICE/OUTPT VISIT, NEW, LEVL IV, 45-59 MIN: ICD-10-PCS | Mod: 25,HCNC,S$GLB, | Performed by: OBSTETRICS & GYNECOLOGY

## 2021-10-13 PROCEDURE — 3288F PR FALLS RISK ASSESSMENT DOCUMENTED: ICD-10-PCS | Mod: HCNC,CPTII,S$GLB, | Performed by: OBSTETRICS & GYNECOLOGY

## 2021-10-13 PROCEDURE — 3078F DIAST BP <80 MM HG: CPT | Mod: HCNC,CPTII,S$GLB, | Performed by: OBSTETRICS & GYNECOLOGY

## 2021-10-13 PROCEDURE — 3008F BODY MASS INDEX DOCD: CPT | Mod: HCNC,CPTII,S$GLB, | Performed by: OBSTETRICS & GYNECOLOGY

## 2021-10-13 PROCEDURE — 1160F RVW MEDS BY RX/DR IN RCRD: CPT | Mod: HCNC,CPTII,S$GLB, | Performed by: OBSTETRICS & GYNECOLOGY

## 2021-10-13 PROCEDURE — 87086 URINE CULTURE/COLONY COUNT: CPT | Mod: HCNC | Performed by: OBSTETRICS & GYNECOLOGY

## 2021-10-13 PROCEDURE — 1157F PR ADVANCE CARE PLAN OR EQUIV PRESENT IN MEDICAL RECORD: ICD-10-PCS | Mod: HCNC,CPTII,S$GLB, | Performed by: OBSTETRICS & GYNECOLOGY

## 2021-10-13 PROCEDURE — 87624 HPV HI-RISK TYP POOLED RSLT: CPT | Mod: HCNC | Performed by: OBSTETRICS & GYNECOLOGY

## 2021-10-13 PROCEDURE — 3288F FALL RISK ASSESSMENT DOCD: CPT | Mod: HCNC,CPTII,S$GLB, | Performed by: OBSTETRICS & GYNECOLOGY

## 2021-10-13 PROCEDURE — 87077 CULTURE AEROBIC IDENTIFY: CPT | Mod: HCNC | Performed by: OBSTETRICS & GYNECOLOGY

## 2021-10-13 PROCEDURE — 3008F PR BODY MASS INDEX (BMI) DOCUMENTED: ICD-10-PCS | Mod: HCNC,CPTII,S$GLB, | Performed by: OBSTETRICS & GYNECOLOGY

## 2021-10-13 PROCEDURE — 1101F PR PT FALLS ASSESS DOC 0-1 FALLS W/OUT INJ PAST YR: ICD-10-PCS | Mod: HCNC,CPTII,S$GLB, | Performed by: OBSTETRICS & GYNECOLOGY

## 2021-10-13 PROCEDURE — 3077F PR MOST RECENT SYSTOLIC BLOOD PRESSURE >= 140 MM HG: ICD-10-PCS | Mod: HCNC,CPTII,S$GLB, | Performed by: OBSTETRICS & GYNECOLOGY

## 2021-10-13 PROCEDURE — 1101F PT FALLS ASSESS-DOCD LE1/YR: CPT | Mod: HCNC,CPTII,S$GLB, | Performed by: OBSTETRICS & GYNECOLOGY

## 2021-10-13 PROCEDURE — 1159F MED LIST DOCD IN RCRD: CPT | Mod: HCNC,CPTII,S$GLB, | Performed by: OBSTETRICS & GYNECOLOGY

## 2021-10-13 PROCEDURE — 3077F SYST BP >= 140 MM HG: CPT | Mod: HCNC,CPTII,S$GLB, | Performed by: OBSTETRICS & GYNECOLOGY

## 2021-10-13 PROCEDURE — 3078F PR MOST RECENT DIASTOLIC BLOOD PRESSURE < 80 MM HG: ICD-10-PCS | Mod: HCNC,CPTII,S$GLB, | Performed by: OBSTETRICS & GYNECOLOGY

## 2021-10-13 PROCEDURE — 51701 INSERT BLADDER CATHETER: CPT | Mod: HCNC,S$GLB,, | Performed by: OBSTETRICS & GYNECOLOGY

## 2021-10-13 PROCEDURE — 1157F ADVNC CARE PLAN IN RCRD: CPT | Mod: HCNC,CPTII,S$GLB, | Performed by: OBSTETRICS & GYNECOLOGY

## 2021-10-13 PROCEDURE — 99999 PR PBB SHADOW E&M-EST. PATIENT-LVL IV: CPT | Mod: PBBFAC,HCNC,, | Performed by: OBSTETRICS & GYNECOLOGY

## 2021-10-13 RX ORDER — PERMETHRIN 50 MG/G
CREAM TOPICAL
COMMUNITY
Start: 2021-05-23 | End: 2022-06-14 | Stop reason: SDUPTHER

## 2021-10-14 ENCOUNTER — PATIENT MESSAGE (OUTPATIENT)
Dept: UROGYNECOLOGY | Facility: CLINIC | Age: 71
End: 2021-10-14
Payer: MEDICARE

## 2021-10-14 PROBLEM — K59.09 CHRONIC CONSTIPATION: Status: ACTIVE | Noted: 2021-10-14

## 2021-10-14 PROBLEM — R19.8 STRAINING WITH STOOLS: Status: ACTIVE | Noted: 2021-10-14

## 2021-10-14 PROBLEM — N81.11 CYSTOCELE, MIDLINE: Status: ACTIVE | Noted: 2021-10-14

## 2021-10-14 PROBLEM — N81.4 UTEROVAGINAL PROLAPSE: Status: ACTIVE | Noted: 2021-10-14

## 2021-10-14 PROBLEM — N81.6 RECTOCELE, FEMALE: Status: ACTIVE | Noted: 2021-10-14

## 2021-10-15 ENCOUNTER — OFFICE VISIT (OUTPATIENT)
Dept: INTERNAL MEDICINE | Facility: CLINIC | Age: 71
End: 2021-10-15
Payer: MEDICARE

## 2021-10-15 VITALS
SYSTOLIC BLOOD PRESSURE: 128 MMHG | OXYGEN SATURATION: 97 % | HEIGHT: 66 IN | BODY MASS INDEX: 30.76 KG/M2 | RESPIRATION RATE: 18 BRPM | HEART RATE: 92 BPM | WEIGHT: 191.38 LBS | DIASTOLIC BLOOD PRESSURE: 74 MMHG | TEMPERATURE: 98 F

## 2021-10-15 DIAGNOSIS — Z00.00 ANNUAL PHYSICAL EXAM: Primary | ICD-10-CM

## 2021-10-15 DIAGNOSIS — J84.10 CALCIFIED GRANULOMA OF LUNG: ICD-10-CM

## 2021-10-15 PROCEDURE — 3008F BODY MASS INDEX DOCD: CPT | Mod: HCNC,CPTII,S$GLB, | Performed by: INTERNAL MEDICINE

## 2021-10-15 PROCEDURE — 3288F FALL RISK ASSESSMENT DOCD: CPT | Mod: HCNC,CPTII,S$GLB, | Performed by: INTERNAL MEDICINE

## 2021-10-15 PROCEDURE — 99397 PR PREVENTIVE VISIT,EST,65 & OVER: ICD-10-PCS | Mod: HCNC,S$GLB,, | Performed by: INTERNAL MEDICINE

## 2021-10-15 PROCEDURE — 3008F PR BODY MASS INDEX (BMI) DOCUMENTED: ICD-10-PCS | Mod: HCNC,CPTII,S$GLB, | Performed by: INTERNAL MEDICINE

## 2021-10-15 PROCEDURE — 3074F SYST BP LT 130 MM HG: CPT | Mod: HCNC,CPTII,S$GLB, | Performed by: INTERNAL MEDICINE

## 2021-10-15 PROCEDURE — 1101F PT FALLS ASSESS-DOCD LE1/YR: CPT | Mod: HCNC,CPTII,S$GLB, | Performed by: INTERNAL MEDICINE

## 2021-10-15 PROCEDURE — 99397 PER PM REEVAL EST PAT 65+ YR: CPT | Mod: HCNC,S$GLB,, | Performed by: INTERNAL MEDICINE

## 2021-10-15 PROCEDURE — 1157F PR ADVANCE CARE PLAN OR EQUIV PRESENT IN MEDICAL RECORD: ICD-10-PCS | Mod: HCNC,CPTII,S$GLB, | Performed by: INTERNAL MEDICINE

## 2021-10-15 PROCEDURE — 1126F AMNT PAIN NOTED NONE PRSNT: CPT | Mod: HCNC,CPTII,S$GLB, | Performed by: INTERNAL MEDICINE

## 2021-10-15 PROCEDURE — 3078F PR MOST RECENT DIASTOLIC BLOOD PRESSURE < 80 MM HG: ICD-10-PCS | Mod: HCNC,CPTII,S$GLB, | Performed by: INTERNAL MEDICINE

## 2021-10-15 PROCEDURE — 1126F PR PAIN SEVERITY QUANTIFIED, NO PAIN PRESENT: ICD-10-PCS | Mod: HCNC,CPTII,S$GLB, | Performed by: INTERNAL MEDICINE

## 2021-10-15 PROCEDURE — 99999 PR PBB SHADOW E&M-EST. PATIENT-LVL III: CPT | Mod: PBBFAC,HCNC,, | Performed by: INTERNAL MEDICINE

## 2021-10-15 PROCEDURE — 1101F PR PT FALLS ASSESS DOC 0-1 FALLS W/OUT INJ PAST YR: ICD-10-PCS | Mod: HCNC,CPTII,S$GLB, | Performed by: INTERNAL MEDICINE

## 2021-10-15 PROCEDURE — 3078F DIAST BP <80 MM HG: CPT | Mod: HCNC,CPTII,S$GLB, | Performed by: INTERNAL MEDICINE

## 2021-10-15 PROCEDURE — 3288F PR FALLS RISK ASSESSMENT DOCUMENTED: ICD-10-PCS | Mod: HCNC,CPTII,S$GLB, | Performed by: INTERNAL MEDICINE

## 2021-10-15 PROCEDURE — 3074F PR MOST RECENT SYSTOLIC BLOOD PRESSURE < 130 MM HG: ICD-10-PCS | Mod: HCNC,CPTII,S$GLB, | Performed by: INTERNAL MEDICINE

## 2021-10-15 PROCEDURE — 1160F RVW MEDS BY RX/DR IN RCRD: CPT | Mod: HCNC,CPTII,S$GLB, | Performed by: INTERNAL MEDICINE

## 2021-10-15 PROCEDURE — 1159F MED LIST DOCD IN RCRD: CPT | Mod: HCNC,CPTII,S$GLB, | Performed by: INTERNAL MEDICINE

## 2021-10-15 PROCEDURE — 1157F ADVNC CARE PLAN IN RCRD: CPT | Mod: HCNC,CPTII,S$GLB, | Performed by: INTERNAL MEDICINE

## 2021-10-15 PROCEDURE — 1160F PR REVIEW ALL MEDS BY PRESCRIBER/CLIN PHARMACIST DOCUMENTED: ICD-10-PCS | Mod: HCNC,CPTII,S$GLB, | Performed by: INTERNAL MEDICINE

## 2021-10-15 PROCEDURE — 99999 PR PBB SHADOW E&M-EST. PATIENT-LVL III: ICD-10-PCS | Mod: PBBFAC,HCNC,, | Performed by: INTERNAL MEDICINE

## 2021-10-15 PROCEDURE — 1159F PR MEDICATION LIST DOCUMENTED IN MEDICAL RECORD: ICD-10-PCS | Mod: HCNC,CPTII,S$GLB, | Performed by: INTERNAL MEDICINE

## 2021-10-15 RX ORDER — BROMPHENIRAMINE MALEATE, DEXTROMETHORPHAN HBR, PHENYLEPHRINE HCL, DIPHENHYDRAMINE HCL, PHENYLEPHRINE HCL 0.52G
0.52 KIT ORAL DAILY
COMMUNITY

## 2021-10-16 ENCOUNTER — PATIENT MESSAGE (OUTPATIENT)
Dept: UROGYNECOLOGY | Facility: CLINIC | Age: 71
End: 2021-10-16

## 2021-10-16 ENCOUNTER — TELEPHONE (OUTPATIENT)
Dept: UROGYNECOLOGY | Facility: CLINIC | Age: 71
End: 2021-10-16

## 2021-10-16 LAB — BACTERIA UR CULT: ABNORMAL

## 2021-10-16 RX ORDER — NITROFURANTOIN 25; 75 MG/1; MG/1
100 CAPSULE ORAL 2 TIMES DAILY
Qty: 14 CAPSULE | Refills: 0 | Status: SHIPPED | OUTPATIENT
Start: 2021-10-16 | End: 2021-10-23

## 2021-10-18 ENCOUNTER — PATIENT OUTREACH (OUTPATIENT)
Dept: ADMINISTRATIVE | Facility: HOSPITAL | Age: 71
End: 2021-10-18

## 2021-10-18 ENCOUNTER — PATIENT MESSAGE (OUTPATIENT)
Dept: INTERNAL MEDICINE | Facility: CLINIC | Age: 71
End: 2021-10-18
Payer: MEDICARE

## 2021-10-19 LAB
FINAL PATHOLOGIC DIAGNOSIS: NORMAL
Lab: NORMAL

## 2021-10-27 ENCOUNTER — PATIENT MESSAGE (OUTPATIENT)
Dept: UROGYNECOLOGY | Facility: CLINIC | Age: 71
End: 2021-10-27
Payer: MEDICARE

## 2021-10-27 LAB
HPV HR 12 DNA SPEC QL NAA+PROBE: NEGATIVE
HPV16 AG SPEC QL: NEGATIVE
HPV18 DNA SPEC QL NAA+PROBE: NEGATIVE

## 2021-10-29 ENCOUNTER — IMMUNIZATION (OUTPATIENT)
Dept: PRIMARY CARE CLINIC | Facility: CLINIC | Age: 71
End: 2021-10-29
Payer: MEDICARE

## 2021-10-29 DIAGNOSIS — Z23 NEED FOR VACCINATION: Primary | ICD-10-CM

## 2021-10-29 PROCEDURE — 0003A COVID-19, MRNA, LNP-S, PF, 30 MCG/0.3 ML DOSE VACCINE: CPT | Mod: CV19,PBBFAC | Performed by: INTERNAL MEDICINE

## 2021-10-29 PROCEDURE — 91300 COVID-19, MRNA, LNP-S, PF, 30 MCG/0.3 ML DOSE VACCINE: CPT | Mod: PBBFAC | Performed by: INTERNAL MEDICINE

## 2021-12-30 ENCOUNTER — OFFICE VISIT (OUTPATIENT)
Dept: OPTOMETRY | Facility: CLINIC | Age: 71
End: 2021-12-30
Payer: MEDICARE

## 2021-12-30 DIAGNOSIS — H53.19 PHOTOPSIA OF LEFT EYE: Primary | ICD-10-CM

## 2021-12-30 DIAGNOSIS — H04.123 DRY EYES, BILATERAL: ICD-10-CM

## 2021-12-30 DIAGNOSIS — H25.13 NUCLEAR SCLEROSIS, BILATERAL: ICD-10-CM

## 2021-12-30 DIAGNOSIS — Z13.5 GLAUCOMA SCREENING: ICD-10-CM

## 2021-12-30 PROCEDURE — 1101F PT FALLS ASSESS-DOCD LE1/YR: CPT | Mod: HCNC,CPTII,S$GLB, | Performed by: OPTOMETRIST

## 2021-12-30 PROCEDURE — 1126F PR PAIN SEVERITY QUANTIFIED, NO PAIN PRESENT: ICD-10-PCS | Mod: HCNC,CPTII,S$GLB, | Performed by: OPTOMETRIST

## 2021-12-30 PROCEDURE — 3288F FALL RISK ASSESSMENT DOCD: CPT | Mod: HCNC,CPTII,S$GLB, | Performed by: OPTOMETRIST

## 2021-12-30 PROCEDURE — 1126F AMNT PAIN NOTED NONE PRSNT: CPT | Mod: HCNC,CPTII,S$GLB, | Performed by: OPTOMETRIST

## 2021-12-30 PROCEDURE — 1157F ADVNC CARE PLAN IN RCRD: CPT | Mod: HCNC,CPTII,S$GLB, | Performed by: OPTOMETRIST

## 2021-12-30 PROCEDURE — 1159F PR MEDICATION LIST DOCUMENTED IN MEDICAL RECORD: ICD-10-PCS | Mod: HCNC,CPTII,S$GLB, | Performed by: OPTOMETRIST

## 2021-12-30 PROCEDURE — 99999 PR PBB SHADOW E&M-EST. PATIENT-LVL II: CPT | Mod: PBBFAC,HCNC,, | Performed by: OPTOMETRIST

## 2021-12-30 PROCEDURE — 1157F PR ADVANCE CARE PLAN OR EQUIV PRESENT IN MEDICAL RECORD: ICD-10-PCS | Mod: HCNC,CPTII,S$GLB, | Performed by: OPTOMETRIST

## 2021-12-30 PROCEDURE — 92014 PR EYE EXAM, EST PATIENT,COMPREHESV: ICD-10-PCS | Mod: HCNC,S$GLB,, | Performed by: OPTOMETRIST

## 2021-12-30 PROCEDURE — 1101F PR PT FALLS ASSESS DOC 0-1 FALLS W/OUT INJ PAST YR: ICD-10-PCS | Mod: HCNC,CPTII,S$GLB, | Performed by: OPTOMETRIST

## 2021-12-30 PROCEDURE — 1160F PR REVIEW ALL MEDS BY PRESCRIBER/CLIN PHARMACIST DOCUMENTED: ICD-10-PCS | Mod: HCNC,CPTII,S$GLB, | Performed by: OPTOMETRIST

## 2021-12-30 PROCEDURE — 1159F MED LIST DOCD IN RCRD: CPT | Mod: HCNC,CPTII,S$GLB, | Performed by: OPTOMETRIST

## 2021-12-30 PROCEDURE — 99999 PR PBB SHADOW E&M-EST. PATIENT-LVL II: ICD-10-PCS | Mod: PBBFAC,HCNC,, | Performed by: OPTOMETRIST

## 2021-12-30 PROCEDURE — 92014 COMPRE OPH EXAM EST PT 1/>: CPT | Mod: HCNC,S$GLB,, | Performed by: OPTOMETRIST

## 2021-12-30 PROCEDURE — 3288F PR FALLS RISK ASSESSMENT DOCUMENTED: ICD-10-PCS | Mod: HCNC,CPTII,S$GLB, | Performed by: OPTOMETRIST

## 2021-12-30 PROCEDURE — 1160F RVW MEDS BY RX/DR IN RCRD: CPT | Mod: HCNC,CPTII,S$GLB, | Performed by: OPTOMETRIST

## 2022-02-04 ENCOUNTER — OFFICE VISIT (OUTPATIENT)
Dept: OPTOMETRY | Facility: CLINIC | Age: 72
End: 2022-02-04
Payer: MEDICARE

## 2022-02-04 DIAGNOSIS — H53.19 PHOTOPSIA OF LEFT EYE: Primary | ICD-10-CM

## 2022-02-04 PROCEDURE — 2023F PR DILATED RETINAL EXAM W/O EVID OF RETINOPATHY: ICD-10-PCS | Mod: HCNC,CPTII,S$GLB, | Performed by: OPTOMETRIST

## 2022-02-04 PROCEDURE — 92250 FUNDUS PHOTOGRAPHY W/I&R: CPT | Mod: HCNC,S$GLB,, | Performed by: OPTOMETRIST

## 2022-02-04 PROCEDURE — 1160F RVW MEDS BY RX/DR IN RCRD: CPT | Mod: HCNC,CPTII,S$GLB, | Performed by: OPTOMETRIST

## 2022-02-04 PROCEDURE — 99999 PR PBB SHADOW E&M-EST. PATIENT-LVL II: ICD-10-PCS | Mod: PBBFAC,HCNC,, | Performed by: OPTOMETRIST

## 2022-02-04 PROCEDURE — 1101F PT FALLS ASSESS-DOCD LE1/YR: CPT | Mod: HCNC,CPTII,S$GLB, | Performed by: OPTOMETRIST

## 2022-02-04 PROCEDURE — 1126F AMNT PAIN NOTED NONE PRSNT: CPT | Mod: HCNC,CPTII,S$GLB, | Performed by: OPTOMETRIST

## 2022-02-04 PROCEDURE — 1160F PR REVIEW ALL MEDS BY PRESCRIBER/CLIN PHARMACIST DOCUMENTED: ICD-10-PCS | Mod: HCNC,CPTII,S$GLB, | Performed by: OPTOMETRIST

## 2022-02-04 PROCEDURE — 2023F DILAT RTA XM W/O RTNOPTHY: CPT | Mod: HCNC,CPTII,S$GLB, | Performed by: OPTOMETRIST

## 2022-02-04 PROCEDURE — 1126F PR PAIN SEVERITY QUANTIFIED, NO PAIN PRESENT: ICD-10-PCS | Mod: HCNC,CPTII,S$GLB, | Performed by: OPTOMETRIST

## 2022-02-04 PROCEDURE — 1159F PR MEDICATION LIST DOCUMENTED IN MEDICAL RECORD: ICD-10-PCS | Mod: HCNC,CPTII,S$GLB, | Performed by: OPTOMETRIST

## 2022-02-04 PROCEDURE — 1101F PR PT FALLS ASSESS DOC 0-1 FALLS W/OUT INJ PAST YR: ICD-10-PCS | Mod: HCNC,CPTII,S$GLB, | Performed by: OPTOMETRIST

## 2022-02-04 PROCEDURE — 92014 COMPRE OPH EXAM EST PT 1/>: CPT | Mod: HCNC,S$GLB,, | Performed by: OPTOMETRIST

## 2022-02-04 PROCEDURE — 1159F MED LIST DOCD IN RCRD: CPT | Mod: HCNC,CPTII,S$GLB, | Performed by: OPTOMETRIST

## 2022-02-04 PROCEDURE — 1157F PR ADVANCE CARE PLAN OR EQUIV PRESENT IN MEDICAL RECORD: ICD-10-PCS | Mod: HCNC,CPTII,S$GLB, | Performed by: OPTOMETRIST

## 2022-02-04 PROCEDURE — 92014 PR EYE EXAM, EST PATIENT,COMPREHESV: ICD-10-PCS | Mod: HCNC,S$GLB,, | Performed by: OPTOMETRIST

## 2022-02-04 PROCEDURE — 3288F FALL RISK ASSESSMENT DOCD: CPT | Mod: HCNC,CPTII,S$GLB, | Performed by: OPTOMETRIST

## 2022-02-04 PROCEDURE — 92250 COLOR FUNDUS PHOTOGRAPHY - OU - BOTH EYES: ICD-10-PCS | Mod: HCNC,S$GLB,, | Performed by: OPTOMETRIST

## 2022-02-04 PROCEDURE — 99999 PR PBB SHADOW E&M-EST. PATIENT-LVL II: CPT | Mod: PBBFAC,HCNC,, | Performed by: OPTOMETRIST

## 2022-02-04 PROCEDURE — 1157F ADVNC CARE PLAN IN RCRD: CPT | Mod: HCNC,CPTII,S$GLB, | Performed by: OPTOMETRIST

## 2022-02-04 PROCEDURE — 3288F PR FALLS RISK ASSESSMENT DOCUMENTED: ICD-10-PCS | Mod: HCNC,CPTII,S$GLB, | Performed by: OPTOMETRIST

## 2022-02-04 NOTE — PROGRESS NOTES
HPI     DLS: 12/30/2021    Presents today for 6 week DFE.  Pt reports she still have flashes--not as often as before.    Last edited by Iris Agrawal MA on 2/4/2022  8:39 AM. (History)        ROS     Negative for: Constitutional, Gastrointestinal, Neurological, Skin,   Genitourinary, Musculoskeletal, HENT, Endocrine, Cardiovascular, Eyes,   Respiratory, Psychiatric, Allergic/Imm, Heme/Lymph    Last edited by Sahil Weaver, OD on 2/4/2022  8:48 AM. (History)        Assessment /Plan     For exam results, see Encounter Report.    Photopsia of left eye  -     Color Fundus Photography - OU - Both Eyes      1. Cat OU  2. EULALIO--pt to inc ATs to TID.  Discussed sleep mask at night  3. See prev notes: PVD type flashes OS for the past few months--resolving.  She reports no floaters (tho I see a few in the OS).  She had PVD Dx a few years ago in that eye.  Today DFE wnl/OPTOS photos show no holes/tears/detach    PLAN:    rtc 1 yr, or immediately if inc F/F

## 2022-02-28 ENCOUNTER — PATIENT MESSAGE (OUTPATIENT)
Dept: INTERNAL MEDICINE | Facility: CLINIC | Age: 72
End: 2022-02-28
Payer: MEDICARE

## 2022-03-09 ENCOUNTER — PATIENT MESSAGE (OUTPATIENT)
Dept: INTERNAL MEDICINE | Facility: CLINIC | Age: 72
End: 2022-03-09
Payer: MEDICARE

## 2022-03-16 ENCOUNTER — PATIENT MESSAGE (OUTPATIENT)
Dept: INTERNAL MEDICINE | Facility: CLINIC | Age: 72
End: 2022-03-16
Payer: MEDICARE

## 2022-04-13 NOTE — TELEPHONE ENCOUNTER
----- Message from Lory Garces sent at 11/13/2018  3:38 PM CST -----  Contact: patient [phone]  Please call patient as soon as the flu test has been resulted.    mouth      DAILY MULTIPLE VITAMINS/IRON PO Take by mouth       No current facility-administered medications for this visit. Social History     Socioeconomic History    Marital status:      Spouse name: None    Number of children: None    Years of education: None    Highest education level: None   Occupational History    None   Tobacco Use    Smoking status: Never Smoker    Smokeless tobacco: Never Used   Vaping Use    Vaping Use: Never used   Substance and Sexual Activity    Alcohol use: Never    Drug use: Never    Sexual activity: Yes     Partners: Male   Other Topics Concern    None   Social History Narrative    None     Social Determinants of Health     Financial Resource Strain:     Difficulty of Paying Living Expenses: Not on file   Food Insecurity:     Worried About Running Out of Food in the Last Year: Not on file    Reina of Food in the Last Year: Not on file   Transportation Needs:     Lack of Transportation (Medical): Not on file    Lack of Transportation (Non-Medical):  Not on file   Physical Activity:     Days of Exercise per Week: Not on file    Minutes of Exercise per Session: Not on file   Stress:     Feeling of Stress : Not on file   Social Connections:     Frequency of Communication with Friends and Family: Not on file    Frequency of Social Gatherings with Friends and Family: Not on file    Attends Denominational Services: Not on file    Active Member of [address] [place] [address] or Organizations: Not on file    Attends Club or Organization Meetings: Not on file    Marital Status: Not on file   Intimate Partner Violence:     Fear of Current or Ex-Partner: Not on file    Emotionally Abused: Not on file    Physically Abused: Not on file    Sexually Abused: Not on file   Housing Stability:     Unable to Pay for Housing in the Last Year: Not on file    Number of [place] in the Last Year: Not on file    Unstable Housing in the Last Year: Not on file       REVIEW OF encounter. Return in about 2 weeks (around 4/27/2022) for RA TLH/[facility] BSO.        Electronically signed by James Conley DO on 04/13/22

## 2022-04-18 ENCOUNTER — PATIENT MESSAGE (OUTPATIENT)
Dept: INTERNAL MEDICINE | Facility: CLINIC | Age: 72
End: 2022-04-18
Payer: MEDICARE

## 2022-04-22 ENCOUNTER — PATIENT MESSAGE (OUTPATIENT)
Dept: OPTOMETRY | Facility: CLINIC | Age: 72
End: 2022-04-22
Payer: MEDICARE

## 2022-05-06 ENCOUNTER — PATIENT MESSAGE (OUTPATIENT)
Dept: OPTOMETRY | Facility: CLINIC | Age: 72
End: 2022-05-06
Payer: MEDICARE

## 2022-05-06 ENCOUNTER — PATIENT MESSAGE (OUTPATIENT)
Dept: INTERNAL MEDICINE | Facility: CLINIC | Age: 72
End: 2022-05-06
Payer: MEDICARE

## 2022-05-30 ENCOUNTER — OFFICE VISIT (OUTPATIENT)
Dept: DERMATOLOGY | Facility: CLINIC | Age: 72
End: 2022-05-30
Payer: MEDICARE

## 2022-05-30 DIAGNOSIS — L91.8 SKIN TAGS, MULTIPLE ACQUIRED: ICD-10-CM

## 2022-05-30 DIAGNOSIS — L72.0 EIC (EPIDERMAL INCLUSION CYST): ICD-10-CM

## 2022-05-30 DIAGNOSIS — L57.0 AK (ACTINIC KERATOSIS): Primary | ICD-10-CM

## 2022-05-30 DIAGNOSIS — D22.9 MULTIPLE BENIGN NEVI: ICD-10-CM

## 2022-05-30 DIAGNOSIS — L30.4 INTERTRIGO: ICD-10-CM

## 2022-05-30 DIAGNOSIS — A94: ICD-10-CM

## 2022-05-30 DIAGNOSIS — L82.1 SK (SEBORRHEIC KERATOSIS): ICD-10-CM

## 2022-05-30 PROCEDURE — 1157F ADVNC CARE PLAN IN RCRD: CPT | Mod: CPTII,S$GLB,, | Performed by: DERMATOLOGY

## 2022-05-30 PROCEDURE — 99213 PR OFFICE/OUTPT VISIT, EST, LEVL III, 20-29 MIN: ICD-10-PCS | Mod: 25,S$GLB,, | Performed by: DERMATOLOGY

## 2022-05-30 PROCEDURE — 17000 DESTRUCT PREMALG LESION: CPT | Mod: S$GLB,,, | Performed by: DERMATOLOGY

## 2022-05-30 PROCEDURE — 1160F PR REVIEW ALL MEDS BY PRESCRIBER/CLIN PHARMACIST DOCUMENTED: ICD-10-PCS | Mod: CPTII,S$GLB,, | Performed by: DERMATOLOGY

## 2022-05-30 PROCEDURE — 3288F FALL RISK ASSESSMENT DOCD: CPT | Mod: CPTII,S$GLB,, | Performed by: DERMATOLOGY

## 2022-05-30 PROCEDURE — 17000 PR DESTRUCTION(LASER SURGERY,CRYOSURGERY,CHEMOSURGERY),PREMALIGNANT LESIONS,FIRST LESION: ICD-10-PCS | Mod: S$GLB,,, | Performed by: DERMATOLOGY

## 2022-05-30 PROCEDURE — 1157F PR ADVANCE CARE PLAN OR EQUIV PRESENT IN MEDICAL RECORD: ICD-10-PCS | Mod: CPTII,S$GLB,, | Performed by: DERMATOLOGY

## 2022-05-30 PROCEDURE — 1159F MED LIST DOCD IN RCRD: CPT | Mod: CPTII,S$GLB,, | Performed by: DERMATOLOGY

## 2022-05-30 PROCEDURE — 99999 PR PBB SHADOW E&M-EST. PATIENT-LVL III: ICD-10-PCS | Mod: PBBFAC,,, | Performed by: DERMATOLOGY

## 2022-05-30 PROCEDURE — 1101F PT FALLS ASSESS-DOCD LE1/YR: CPT | Mod: CPTII,S$GLB,, | Performed by: DERMATOLOGY

## 2022-05-30 PROCEDURE — 99999 PR PBB SHADOW E&M-EST. PATIENT-LVL III: CPT | Mod: PBBFAC,,, | Performed by: DERMATOLOGY

## 2022-05-30 PROCEDURE — 1126F PR PAIN SEVERITY QUANTIFIED, NO PAIN PRESENT: ICD-10-PCS | Mod: CPTII,S$GLB,, | Performed by: DERMATOLOGY

## 2022-05-30 PROCEDURE — 1101F PR PT FALLS ASSESS DOC 0-1 FALLS W/OUT INJ PAST YR: ICD-10-PCS | Mod: CPTII,S$GLB,, | Performed by: DERMATOLOGY

## 2022-05-30 PROCEDURE — 3288F PR FALLS RISK ASSESSMENT DOCUMENTED: ICD-10-PCS | Mod: CPTII,S$GLB,, | Performed by: DERMATOLOGY

## 2022-05-30 PROCEDURE — 1159F PR MEDICATION LIST DOCUMENTED IN MEDICAL RECORD: ICD-10-PCS | Mod: CPTII,S$GLB,, | Performed by: DERMATOLOGY

## 2022-05-30 PROCEDURE — 1160F RVW MEDS BY RX/DR IN RCRD: CPT | Mod: CPTII,S$GLB,, | Performed by: DERMATOLOGY

## 2022-05-30 PROCEDURE — 99213 OFFICE O/P EST LOW 20 MIN: CPT | Mod: 25,S$GLB,, | Performed by: DERMATOLOGY

## 2022-05-30 PROCEDURE — 1126F AMNT PAIN NOTED NONE PRSNT: CPT | Mod: CPTII,S$GLB,, | Performed by: DERMATOLOGY

## 2022-05-30 NOTE — PATIENT INSTRUCTIONS

## 2022-05-30 NOTE — PROGRESS NOTES
"  Subjective:       Patient ID:  Charu Lopez is a 72 y.o. female who presents for   Chief Complaint   Patient presents with    Skin Check     TBSE     History of Present Illness: The patient presents for follow up of skin check.    The patient was last seen on: 11/14/2019 for TBSE and no abnormal lesions were found. Seen by  for "scabies" 1 year ago and treated with permethrin  No h/o nmsc or mm    Patient with new complaint of lesion(s)  Location: left hand  Duration:1 year  Symptoms: loose skin  Relieving factors/Previous treatments: none    Patient with new complaint of lesion(s)  Location: neck  Duration: Sept 1 2021  Symptoms: lump  Relieving factors/Previous treatments: alcohol    Patient with new complaint of lesion(s)  Location: left eye  Duration: 1 year  Symptoms: discoloration  Relieving factors/Previous treatments: none            Review of Systems   Skin: Positive for activity-related sunscreen use. Negative for daily sunscreen use and recent sunburn.   Hematologic/Lymphatic: Does not bruise/bleed easily.        Objective:    Physical Exam   Constitutional: She appears well-developed and well-nourished. No distress.   Neurological: She is alert and oriented to person, place, and time. She is not disoriented.   Psychiatric: She has a normal mood and affect.   Skin:   Areas Examined (abnormalities noted in diagram):   Scalp / Hair Palpated and Inspected  Head / Face Inspection Performed  Neck Inspection Performed  Chest / Axilla Inspection Performed  Abdomen Inspection Performed  Genitals / Buttocks / Groin Inspection Performed  Back Inspection Performed  RUE Inspected  LUE Inspection Performed  RLE Inspected  LLE Inspection Performed  Nails and Digits Inspection Performed                       Diagram Legend     Erythematous scaling macule/papule c/w actinic keratosis       Vascular papule c/w angioma      Pigmented verrucoid papule/plaque c/w seborrheic keratosis      Yellow " umbilicated papule c/w sebaceous hyperplasia      Irregularly shaped tan macule c/w lentigo     1-2 mm smooth white papules consistent with Milia      Movable subcutaneous cyst with punctum c/w epidermal inclusion cyst      Subcutaneous movable cyst c/w pilar cyst      Firm pink to brown papule c/w dermatofibroma      Pedunculated fleshy papule(s) c/w skin tag(s)      Evenly pigmented macule c/w junctional nevus     Mildly variegated pigmented, slightly irregular-bordered macule c/w mildly atypical nevus      Flesh colored to evenly pigmented papule c/w intradermal nevus       Pink pearly papule/plaque c/w basal cell carcinoma      Erythematous hyperkeratotic cursted plaque c/w SCC      Surgical scar with no sign of skin cancer recurrence      Open and closed comedones      Inflammatory papules and pustules      Verrucoid papule consistent consistent with wart     Erythematous eczematous patches and plaques     Dystrophic onycholytic nail with subungual debris c/w onychomycosis     Umbilicated papule    Erythematous-base heme-crusted tan verrucoid plaque consistent with inflamed seborrheic keratosis     Erythematous Silvery Scaling Plaque c/w Psoriasis     See annotation      Assessment / Plan:        AK (actinic keratosis)  Cryosurgery Procedure Note    Verbal consent from the patient is obtained including, but not limited to, risk of hypopigmentation/hyperpigmentation, scar, recurrence of lesion. The patient is aware of the precancerous quality and need for treatment of these lesions. Liquid nitrogen cryosurgery is applied to the 1 actinic keratoses, as detailed in the physical exam, to produce a freeze injury. The patient is aware that blisters may form and is instructed on wound care with gentle cleansing and use of vaseline ointment to keep moist until healed. The patient is supplied a handout on cryosurgery and is instructed to call if lesions do not completely resolve.      Skin tags, multiple  acquired  Reassurance given to patient. No treatment is necessary.   Treatment of benign, asymptomatic lesions may be considered cosmetic.      EIC (epidermal inclusion cyst)   - minor problem and chronic.   Reassurance given to patient. No treatment necessary.       SK (seborrheic keratosis)   - minor problem and chronic.   Reassurance given to patient. No treatment necessary.       Multiple benign nevi   - minor problem and chronic.   Reassurance given to patient. No treatment necessary.       Intertrigo  Cool blow dry after showering 1x/day then apply Zeasorb AF powder.     Xanthelasma - left upper inner eyelid  No h/o hypercholesterolemia  Discussed that 50% are idiopathic    Arthropod bites   Not scabies  Use OTC cortisone prn           Follow up if symptoms worsen or fail to improve.

## 2022-06-14 ENCOUNTER — OFFICE VISIT (OUTPATIENT)
Dept: URGENT CARE | Facility: CLINIC | Age: 72
End: 2022-06-14
Payer: MEDICARE

## 2022-06-14 ENCOUNTER — PATIENT MESSAGE (OUTPATIENT)
Dept: INTERNAL MEDICINE | Facility: CLINIC | Age: 72
End: 2022-06-14
Payer: MEDICARE

## 2022-06-14 VITALS
HEIGHT: 66 IN | DIASTOLIC BLOOD PRESSURE: 84 MMHG | TEMPERATURE: 98 F | BODY MASS INDEX: 30.7 KG/M2 | RESPIRATION RATE: 18 BRPM | WEIGHT: 191 LBS | HEART RATE: 79 BPM | SYSTOLIC BLOOD PRESSURE: 145 MMHG | OXYGEN SATURATION: 96 %

## 2022-06-14 DIAGNOSIS — S09.8XXA BLUNT HEAD TRAUMA, INITIAL ENCOUNTER: Primary | ICD-10-CM

## 2022-06-14 DIAGNOSIS — Z20.7 EXPOSURE TO SCABIES: ICD-10-CM

## 2022-06-14 PROCEDURE — 3008F PR BODY MASS INDEX (BMI) DOCUMENTED: ICD-10-PCS | Mod: CPTII,S$GLB,, | Performed by: STUDENT IN AN ORGANIZED HEALTH CARE EDUCATION/TRAINING PROGRAM

## 2022-06-14 PROCEDURE — 1159F MED LIST DOCD IN RCRD: CPT | Mod: CPTII,S$GLB,, | Performed by: STUDENT IN AN ORGANIZED HEALTH CARE EDUCATION/TRAINING PROGRAM

## 2022-06-14 PROCEDURE — 3079F DIAST BP 80-89 MM HG: CPT | Mod: CPTII,S$GLB,, | Performed by: STUDENT IN AN ORGANIZED HEALTH CARE EDUCATION/TRAINING PROGRAM

## 2022-06-14 PROCEDURE — 1125F AMNT PAIN NOTED PAIN PRSNT: CPT | Mod: CPTII,S$GLB,, | Performed by: STUDENT IN AN ORGANIZED HEALTH CARE EDUCATION/TRAINING PROGRAM

## 2022-06-14 PROCEDURE — 1157F PR ADVANCE CARE PLAN OR EQUIV PRESENT IN MEDICAL RECORD: ICD-10-PCS | Mod: CPTII,S$GLB,, | Performed by: STUDENT IN AN ORGANIZED HEALTH CARE EDUCATION/TRAINING PROGRAM

## 2022-06-14 PROCEDURE — 3079F PR MOST RECENT DIASTOLIC BLOOD PRESSURE 80-89 MM HG: ICD-10-PCS | Mod: CPTII,S$GLB,, | Performed by: STUDENT IN AN ORGANIZED HEALTH CARE EDUCATION/TRAINING PROGRAM

## 2022-06-14 PROCEDURE — 1125F PR PAIN SEVERITY QUANTIFIED, PAIN PRESENT: ICD-10-PCS | Mod: CPTII,S$GLB,, | Performed by: STUDENT IN AN ORGANIZED HEALTH CARE EDUCATION/TRAINING PROGRAM

## 2022-06-14 PROCEDURE — 3008F BODY MASS INDEX DOCD: CPT | Mod: CPTII,S$GLB,, | Performed by: STUDENT IN AN ORGANIZED HEALTH CARE EDUCATION/TRAINING PROGRAM

## 2022-06-14 PROCEDURE — 1160F RVW MEDS BY RX/DR IN RCRD: CPT | Mod: CPTII,S$GLB,, | Performed by: STUDENT IN AN ORGANIZED HEALTH CARE EDUCATION/TRAINING PROGRAM

## 2022-06-14 PROCEDURE — 3077F SYST BP >= 140 MM HG: CPT | Mod: CPTII,S$GLB,, | Performed by: STUDENT IN AN ORGANIZED HEALTH CARE EDUCATION/TRAINING PROGRAM

## 2022-06-14 PROCEDURE — 1157F ADVNC CARE PLAN IN RCRD: CPT | Mod: CPTII,S$GLB,, | Performed by: STUDENT IN AN ORGANIZED HEALTH CARE EDUCATION/TRAINING PROGRAM

## 2022-06-14 PROCEDURE — 99214 PR OFFICE/OUTPT VISIT, EST, LEVL IV, 30-39 MIN: ICD-10-PCS | Mod: S$GLB,,, | Performed by: STUDENT IN AN ORGANIZED HEALTH CARE EDUCATION/TRAINING PROGRAM

## 2022-06-14 PROCEDURE — 3077F PR MOST RECENT SYSTOLIC BLOOD PRESSURE >= 140 MM HG: ICD-10-PCS | Mod: CPTII,S$GLB,, | Performed by: STUDENT IN AN ORGANIZED HEALTH CARE EDUCATION/TRAINING PROGRAM

## 2022-06-14 PROCEDURE — 1160F PR REVIEW ALL MEDS BY PRESCRIBER/CLIN PHARMACIST DOCUMENTED: ICD-10-PCS | Mod: CPTII,S$GLB,, | Performed by: STUDENT IN AN ORGANIZED HEALTH CARE EDUCATION/TRAINING PROGRAM

## 2022-06-14 PROCEDURE — 99214 OFFICE O/P EST MOD 30 MIN: CPT | Mod: S$GLB,,, | Performed by: STUDENT IN AN ORGANIZED HEALTH CARE EDUCATION/TRAINING PROGRAM

## 2022-06-14 PROCEDURE — 1159F PR MEDICATION LIST DOCUMENTED IN MEDICAL RECORD: ICD-10-PCS | Mod: CPTII,S$GLB,, | Performed by: STUDENT IN AN ORGANIZED HEALTH CARE EDUCATION/TRAINING PROGRAM

## 2022-06-14 RX ORDER — PERMETHRIN 50 MG/G
CREAM TOPICAL
Qty: 60 G | Refills: 0 | Status: SHIPPED | OUTPATIENT
Start: 2022-06-14 | End: 2023-06-14 | Stop reason: ALTCHOICE

## 2022-06-14 NOTE — PROGRESS NOTES
"Subjective:       Patient ID: Charu Lopez is a 72 y.o. female.    Vitals:  height is 5' 6" (1.676 m) and weight is 86.6 kg (191 lb). Her temperature is 98.3 °F (36.8 °C). Her blood pressure is 145/84 (abnormal) and her pulse is 79. Her respiration is 18 and oxygen saturation is 96%.     Chief Complaint: Fall    72 yr old women tripped over a short piece of furniture she fell to the wall which had a window and she hit her head as well as her right shoulder hard on the window. The fall happened yesterday about 10:30 A.M. no blood minimal bruising. She hasn't had a headache, blurry vision, nausea or vomiting. No numbness or tingling. She is having soreness in her right shoulder and bilateral knees from the fall also. Patient was concerned about a brain bleed and contacted her PCP's office. States she was advised to come to urgent care or the ED since there was no availability in clinic today.     Fall  The accident occurred 12 to 24 hours ago. The fall occurred while walking. She fell from a height of 3 to 5 ft. Impact surface: platform. There was no blood loss. The point of impact was the head and right shoulder. The pain is present in the head, right shoulder and left knee. The pain is at a severity of 3/10. The pain is mild. She has tried ice for the symptoms. The treatment provided mild relief.     ROS      Objective:      Physical Exam   Constitutional: She is oriented to person, place, and time. She does not appear ill. No distress.   HENT:   Head: Normocephalic.   Pulmonary/Chest: No respiratory distress.   Musculoskeletal: Normal range of motion.         General: No swelling or deformity. Normal range of motion.      Comments: Muscle tenderness over right upper back. No bony tenderness of shoulder, clavicle, or knees.   Neurological: no focal deficit. She is alert and oriented to person, place, and time. She displays no weakness. No cranial nerve deficit. Coordination and gait normal.   Skin: " Skin is warm and dry.   Psychiatric: Her behavior is normal. Mood, judgment and thought content normal.   Nursing note and vitals reviewed.        Assessment:       1. Blunt head trauma, initial encounter    2. Exposure to scabies          Plan:         Blunt head trauma, initial encounter    Exposure to scabies  -     permethrin (ELIMITE) 5 % cream; APPLY TOPICALLY TO THE AFFECTED AREA 1 TIME FOR 1 DOSE  Dispense: 60 g; Refill: 0    I discussed with the patient that geriatric patients (over 65) are at high risk for intracranial hemorrhage and they should be emergently evaluated following blunt head trauma with CT.  The patient has a normal neurological exam and no concerning features in her history at this time.  Although my recommendation is still to go to the emergency department for head CT, she was counseled on signs/symptoms that should emergent evaluation via 911 or private transport to the emergency department.    We also discussed patient's recent history of scabies and concerned that she might be exposed again due to new rash.  She has been treating it with permethrin cream which helps but she is running out.  Refill provided.  Recommended professional scabies infestation evaluation.  All patient questions and concerns were addressed prior to discharge.

## 2022-06-15 ENCOUNTER — NURSE TRIAGE (OUTPATIENT)
Dept: ADMINISTRATIVE | Facility: CLINIC | Age: 72
End: 2022-06-15
Payer: MEDICARE

## 2022-06-15 ENCOUNTER — PATIENT MESSAGE (OUTPATIENT)
Dept: INTERNAL MEDICINE | Facility: CLINIC | Age: 72
End: 2022-06-15
Payer: MEDICARE

## 2022-06-15 ENCOUNTER — HOSPITAL ENCOUNTER (EMERGENCY)
Facility: HOSPITAL | Age: 72
Discharge: HOME | End: 2022-06-15
Attending: EMERGENCY MEDICINE
Payer: MEDICARE

## 2022-06-15 VITALS
RESPIRATION RATE: 18 BRPM | SYSTOLIC BLOOD PRESSURE: 161 MMHG | OXYGEN SATURATION: 98 % | HEART RATE: 69 BPM | DIASTOLIC BLOOD PRESSURE: 81 MMHG | TEMPERATURE: 98 F

## 2022-06-15 DIAGNOSIS — S09.90XA INJURY OF HEAD, INITIAL ENCOUNTER: Primary | ICD-10-CM

## 2022-06-15 PROCEDURE — 99284 EMERGENCY DEPT VISIT MOD MDM: CPT | Mod: 25

## 2022-06-15 PROCEDURE — 99282 PR EMERGENCY DEPT VISIT,LEVEL II: ICD-10-PCS | Mod: ,,, | Performed by: EMERGENCY MEDICINE

## 2022-06-15 PROCEDURE — 99282 EMERGENCY DEPT VISIT SF MDM: CPT | Mod: ,,, | Performed by: EMERGENCY MEDICINE

## 2022-06-15 NOTE — ED NOTES
The patient signed the transfer form, witnessed by RN, dated and timed. Dietary was called and a pec breakfast tray was ordered for the patient. Pt was notified of her pending transfer to  General

## 2022-06-15 NOTE — ED PROVIDER NOTES
Encounter Date: 6/15/2022       History     Chief Complaint   Patient presents with    Fall     Pt reports fall on the back of her head Monday. -blood thinners. -loc. Pt reports only symptom is soreness to the area.     Shoulder Injury     Pt reports right sided shoulder pain.      EDGAR Stein is a 72-year-old female with past medical history of osteopenia who presents with pain on the right side of her head after a fall that occurred on Monday.  She states that it was a mechanical trip and fall.  She hit her right back part of her head on a window.  Did not fall all the way to the ground.  She did not have other significant injuries.  She did not lose consciousness.  She denies blood thinner use.  She has not been having numbness or weakness or imbalance.  She states that she was told by a medical practitioner to come to the emergency department to get a CT scan of her head.    Review of patient's allergies indicates:   Allergen Reactions    Benadryl [diphenhydramine hcl]     Tetracyclines      Stomach upset       Past Medical History:   Diagnosis Date    Allergy birth    benadryl    Cataract     Colon polyps     Endometrioma of ovary     Fever blister     Osteoarthritis-knee, hand 1/21/2020    Osteopenia     Skin disease august 29, 2019     Past Surgical History:   Procedure Laterality Date    BREAST BIOPSY  91    wnl     COLONOSCOPY N/A 2/24/2020    Procedure: COLONOSCOPY;  Surgeon: STEPHANIE Webb MD;  Location: [facility] ([address] [place] [address]);  Service: Endoscopy;  Laterality: N/A;    ENDOMETRIAL BIOPSY  12/12/2012    laparatomy 1994      OOPHORECTOMY  1995    Left ov removal due to endometrioma    SKIN BIOPSY  1990s     Family History   Problem Relation Age of Onset    Cancer Father     Colon cancer Father 58    Diabetes Father     Hypertension Father     Stroke Mother     Dementia Mother     Alzheimer's disease Mother     Alzheimer's disease Sister     Polycystic kidney disease Daughter      No Known Problems Son     Breast cancer Neg Hx     Ovarian cancer Neg Hx     Amblyopia Neg Hx     Strabismus Neg Hx     Thyroid disease Neg Hx     Retinal detachment Neg Hx     Macular degeneration Neg Hx     Glaucoma Neg Hx     Cataracts Neg Hx     Blindness Neg Hx     Melanoma Neg Hx     Psoriasis Neg Hx      Social History     Tobacco Use    Smoking status: Never Smoker    Smokeless tobacco: Never Used   Substance Use Topics    Alcohol use: No    Drug use: Never     Review of Systems  Constitutional: Denies fever  HENT: Denies sore throat  Eyes: Denies eye pain  Respiratory: Denies shortness of breath  CV: Denies chest pain  GI: Denies abdominal pain, N/V  MSK: Denies joint pain  Skin: Denies laceration  Neuro: Denies headache (states only skin pain on the R side of her head)    Physical Exam     Initial Vitals [06/15/22 0349]   BP Pulse Resp Temp SpO2   (!) 161/81 69 18 97.8 °F (36.6 °C) 98 %      MAP       --         Physical Exam  General: Awake and alert, well-nourished  HENT: moist mucous membranes  Eyes: No conjunctival injection  Pulm: CTAB, no increased work of breathing  CV: Regular rate and rhythm, no murmur noted  Abdomen: Nondistended, non-tender to palpation  MSK: No LE edema  Skin: No rash noted  Neuro: GCS 15, normal mentation, no nystagmus, CN II-XII intact, normal sensation to fine touch in arms and legs, 5/5 strength bilaterally with elbow flexion/extension, hip flexion, knee flexion/extension, and ankle plantar/dorsiflexion.  Normal finger to nose bilaterally.  Stable gait.  Stable tandem gait.  Psychiatric: Cooperative    ED Course   Procedures  Labs Reviewed - No data to display       Imaging Results          CT Head Without Contrast (Final result)  Result time 06/15/22 07:30:08    Final result by El Hilario MD (06/15/22 07:30:08)                 Impression:      No acute intracranial findings.      Electronically signed by: El  Sulaiman  Date:    06/15/2022  Time:    07:30             Narrative:    EXAMINATION:  CT HEAD WITHOUT CONTRAST    CLINICAL HISTORY:  Head trauma, minor (Age >= 65y);    TECHNIQUE:  Low dose axial images were obtained through the head.  Coronal and sagittal reformations were also performed. Contrast was not administered.    COMPARISON:  None.    FINDINGS:  Blood: No acute intracranial hemorrhage.    Parenchyma: No definite loss of gray-white differentiation to suggest acute or subacute transcortical infarct. Generalized pattern of age-related parenchymal volume loss.  Nonspecific areas of white matter hypoattenuation may relate to sequela of chronic small vessel ischemic disease.    Ventricles/Extra-axial spaces: No abnormal extra-axial fluid collection. Basal cisterns are patent.    Vessels: Suggested trace vascular calcifications.    Orbits: Unremarkable.    Scalp: Unremarkable.    Skull: There are no depressed skull fractures or destructive bone lesions.    Sinuses and mastoids: Scattered paranasal sinus mucosal thickening with possible tiny retention cysts.    Other findings: None                                 Medications - No data to display  Medical Decision Making:   Initial Assessment:   Well appearing, neuro exam reassuring.  Will CT head given age and head trauma.  Differential Diagnosis:   Intracranial bleed, concussion, head trauma  ED Management:  CT head without acute intracranial findings.  Ok for discharge and follow up with PCP.  Discharged with return precautions.                      Clinical Impression:   Final diagnoses:  [S09.90XA] Injury of head, initial encounter (Primary)          ED Disposition Condition    Discharge Stable        ED Prescriptions     None        Follow-up Information     Follow up With Specialties Details Why Contact Janelle Garibay, DO Internal Medicine  As needed [address] [facility] [address]  [phone]             Chencho Baumann  MD  06/16/22 8243

## 2022-06-15 NOTE — TELEPHONE ENCOUNTER
"Pt with head injury from a few days ago, was [place] [facility] and recommended go to ED and get a CT scan.  Pt triage tonight states go to ED now.  All questions answered.     Reason for Disposition   Large swelling or bruise > 2 inches (5 cm)    Additional Information   Negative: [1] ACUTE NEURO SYMPTOM AND [2] present now  (DEFINITION: difficult to awaken OR confused thinking and talking OR slurred speech OR weakness of arms OR unsteady walking)   Negative: Knocked out (unconscious) > 1 minute   Negative: Seizure (convulsion) occurred  (Exception: prior history of seizures and now alert and without Acute Neuro Symptoms)   Negative: Penetrating head injury (e.g., knife, gun shot wound, metal object)   Negative: [1] Major bleeding (e.g., actively dripping or spurting) AND [2] can't be stopped   Negative: [1] Dangerous mechanism of injury (e.g., MVA, diving, trampoline, contact sports, fall > 10 feet or 3 meters) AND [2] NECK pain AND [3] began < 1 hour after injury   Negative: Sounds like a life-threatening emergency to the triager   Negative: [1] Diagnosed with concussion AND [2] within last 14 days   Negative: Can't remember what happened (amnesia)   Negative: Vomiting once or more   Negative: [1] Loss of vision or double vision AND [2] present now   Negative: Watery or blood-tinged fluid dripping from the NOSE or EARS now  (Exception: tears from crying or nosebleed from nasal trauma)   Negative: [1] One or two "black eyes" (bruising, purple color of eyelids) AND [2] onset within 24 hours of head injury    Protocols used: HEAD INJURY-A-      "

## 2022-07-03 ENCOUNTER — OFFICE VISIT (OUTPATIENT)
Dept: URGENT CARE | Facility: CLINIC | Age: 72
End: 2022-07-03
Payer: MEDICARE

## 2022-07-03 VITALS
OXYGEN SATURATION: 96 % | SYSTOLIC BLOOD PRESSURE: 121 MMHG | HEART RATE: 108 BPM | RESPIRATION RATE: 16 BRPM | BODY MASS INDEX: 30.69 KG/M2 | WEIGHT: 190.94 LBS | DIASTOLIC BLOOD PRESSURE: 76 MMHG | TEMPERATURE: 99 F | HEIGHT: 66 IN

## 2022-07-03 DIAGNOSIS — U07.1 COVID-19 VIRUS DETECTED: ICD-10-CM

## 2022-07-03 DIAGNOSIS — U07.1 COVID: Primary | ICD-10-CM

## 2022-07-03 LAB
CTP QC/QA: YES
SARS-COV-2 RDRP RESP QL NAA+PROBE: POSITIVE

## 2022-07-03 PROCEDURE — 1159F MED LIST DOCD IN RCRD: CPT | Mod: CPTII,S$GLB,, | Performed by: NURSE PRACTITIONER

## 2022-07-03 PROCEDURE — 3078F DIAST BP <80 MM HG: CPT | Mod: CPTII,S$GLB,, | Performed by: NURSE PRACTITIONER

## 2022-07-03 PROCEDURE — 3008F PR BODY MASS INDEX (BMI) DOCUMENTED: ICD-10-PCS | Mod: CPTII,S$GLB,, | Performed by: NURSE PRACTITIONER

## 2022-07-03 PROCEDURE — U0002: ICD-10-PCS | Mod: QW,S$GLB,, | Performed by: NURSE PRACTITIONER

## 2022-07-03 PROCEDURE — 99213 OFFICE O/P EST LOW 20 MIN: CPT | Mod: S$GLB,,, | Performed by: NURSE PRACTITIONER

## 2022-07-03 PROCEDURE — 3074F SYST BP LT 130 MM HG: CPT | Mod: CPTII,S$GLB,, | Performed by: NURSE PRACTITIONER

## 2022-07-03 PROCEDURE — 1160F PR REVIEW ALL MEDS BY PRESCRIBER/CLIN PHARMACIST DOCUMENTED: ICD-10-PCS | Mod: CPTII,S$GLB,, | Performed by: NURSE PRACTITIONER

## 2022-07-03 PROCEDURE — 3078F PR MOST RECENT DIASTOLIC BLOOD PRESSURE < 80 MM HG: ICD-10-PCS | Mod: CPTII,S$GLB,, | Performed by: NURSE PRACTITIONER

## 2022-07-03 PROCEDURE — 3074F PR MOST RECENT SYSTOLIC BLOOD PRESSURE < 130 MM HG: ICD-10-PCS | Mod: CPTII,S$GLB,, | Performed by: NURSE PRACTITIONER

## 2022-07-03 PROCEDURE — 1159F PR MEDICATION LIST DOCUMENTED IN MEDICAL RECORD: ICD-10-PCS | Mod: CPTII,S$GLB,, | Performed by: NURSE PRACTITIONER

## 2022-07-03 PROCEDURE — 1157F ADVNC CARE PLAN IN RCRD: CPT | Mod: CPTII,S$GLB,, | Performed by: NURSE PRACTITIONER

## 2022-07-03 PROCEDURE — 3008F BODY MASS INDEX DOCD: CPT | Mod: CPTII,S$GLB,, | Performed by: NURSE PRACTITIONER

## 2022-07-03 PROCEDURE — 1157F PR ADVANCE CARE PLAN OR EQUIV PRESENT IN MEDICAL RECORD: ICD-10-PCS | Mod: CPTII,S$GLB,, | Performed by: NURSE PRACTITIONER

## 2022-07-03 PROCEDURE — U0002 COVID-19 LAB TEST NON-CDC: HCPCS | Mod: QW,S$GLB,, | Performed by: NURSE PRACTITIONER

## 2022-07-03 PROCEDURE — 1126F AMNT PAIN NOTED NONE PRSNT: CPT | Mod: CPTII,S$GLB,, | Performed by: NURSE PRACTITIONER

## 2022-07-03 PROCEDURE — 99213 PR OFFICE/OUTPT VISIT, EST, LEVL III, 20-29 MIN: ICD-10-PCS | Mod: S$GLB,,, | Performed by: NURSE PRACTITIONER

## 2022-07-03 PROCEDURE — 1160F RVW MEDS BY RX/DR IN RCRD: CPT | Mod: CPTII,S$GLB,, | Performed by: NURSE PRACTITIONER

## 2022-07-03 PROCEDURE — 1126F PR PAIN SEVERITY QUANTIFIED, NO PAIN PRESENT: ICD-10-PCS | Mod: CPTII,S$GLB,, | Performed by: NURSE PRACTITIONER

## 2022-07-03 RX ORDER — BENZONATATE 200 MG/1
200 CAPSULE ORAL 3 TIMES DAILY PRN
Qty: 30 CAPSULE | Refills: 0 | Status: SHIPPED | OUTPATIENT
Start: 2022-07-03 | End: 2022-07-13

## 2022-07-03 NOTE — PROGRESS NOTES
"Subjective:       Patient ID: Charu Lopez is a 72 y.o. female.    Vitals:  height is 5' 6" (1.676 m) and weight is 86.6 kg (190 lb 14.7 oz). Her temperature is 98.7 °F (37.1 °C). Her blood pressure is 121/76 and her pulse is 108. Her respiration is 16 and oxygen saturation is 96%.     Chief Complaint: Cough    71 y/o presents to  today with c/o fatigue, fever with the highest reading being 100.8, cough productive of clear sputum, and a runny nose x2 days. Patient tried taking Tyleol to help with symptoms and had some relief. Denies chest pain, SOB, wheeze, nausea, vomiting, and diarrhea.     Cough  This is a new problem. The current episode started in the past 7 days. The problem has been gradually worsening. The problem occurs constantly. The cough is productive of sputum. Associated symptoms include a fever, nasal congestion and postnasal drip. Nothing aggravates the symptoms. The treatment provided no relief.       Constitution: Positive for fever.   HENT: Positive for postnasal drip.    Respiratory: Positive for cough.        Objective:      Physical Exam   Constitutional: She is oriented to person, place, and time. She appears well-developed. She is cooperative.  Non-toxic appearance. She does not appear ill. No distress.   HENT:   Head: Normocephalic and atraumatic.   Ears:   Right Ear: Hearing normal.   Left Ear: Hearing normal.   Nose: Congestion present. No mucosal edema, rhinorrhea or nasal deformity. No epistaxis. Right sinus exhibits no maxillary sinus tenderness and no frontal sinus tenderness. Left sinus exhibits no maxillary sinus tenderness and no frontal sinus tenderness.   Mouth/Throat: Uvula is midline, oropharynx is clear and moist and mucous membranes are normal. Mucous membranes are moist. No trismus in the jaw. Normal dentition. No uvula swelling. No oropharyngeal exudate, posterior oropharyngeal edema or posterior oropharyngeal erythema. Oropharynx is clear.   Eyes: " Conjunctivae and lids are normal. No scleral icterus.   Neck: Trachea normal and phonation normal. Neck supple. No edema present. No erythema present. No neck rigidity present.   Cardiovascular: Normal rate, regular rhythm and normal heart sounds.   Pulmonary/Chest: Effort normal and breath sounds normal. No respiratory distress. She has no decreased breath sounds. She has no rhonchi.   Abdominal: Normal appearance.   Musculoskeletal: Normal range of motion.         General: No deformity. Normal range of motion.   Neurological: She is alert and oriented to person, place, and time. She exhibits normal muscle tone. Coordination normal.   Skin: Skin is warm, dry, intact, not diaphoretic and not pale.   Psychiatric: Her speech is normal and behavior is normal. Judgment and thought content normal.   Nursing note and vitals reviewed.        Results for orders placed or performed in visit on 07/03/22   POCT COVID-19 Rapid Screening   Result Value Ref Range    POC Rapid COVID Positive (A) Negative     Acceptable Yes      Assessment:       1. COVID          Plan:         COVID  -     POCT COVID-19 Rapid Screening  -     nirmatrelvir-ritonavir 150 mg x 2- 100 mg copackaged tablets (EUA); Take 3 tablets by mouth 2 (two) times daily for 5 days. Each dose contains 2 nirmatrelvir (pink tablets) and 1 ritonavir (white tablet). Take all 3 tablets together  Dispense: 30 tablet; Refill: 0  -     benzonatate (TESSALON) 200 MG capsule; Take 1 capsule (200 mg total) by mouth 3 (three) times daily as needed for Cough.  Dispense: 30 capsule; Refill: 0         3: covid risk score is 3         Patient Instructions   Oral fluids  Rest  Steam (hot showers, hot tea)  Blow nose often  Droplet and contact precautions  Self quarantine x 5 days-ok to come out of quarantine as long as symptoms are improving on day 6  Continue to wear mask for 10 days  Follow up with worsening symptoms  Seek ER care with symptoms such as wheeze,  respiratory distress, lethargy, dehydration

## 2022-07-03 NOTE — PATIENT INSTRUCTIONS
Oral fluids  Rest  Steam (hot showers, hot tea)  Blow nose often  Droplet and contact precautions  Self quarantine x 5 days-ok to come out of quarantine as long as symptoms are improving on day 6  Continue to wear mask for 10 days  Follow up with worsening symptoms  Seek ER care with symptoms such as wheeze, respiratory distress, lethargy, dehydration

## 2022-07-19 PROBLEM — D72.819 LEUKOCYTOPENIA: Status: RESOLVED | Noted: 2020-01-23 | Resolved: 2022-07-19

## 2022-07-19 PROBLEM — R19.5 POSITIVE FIT (FECAL IMMUNOCHEMICAL TEST): Status: RESOLVED | Noted: 2020-01-21 | Resolved: 2022-07-19

## 2022-07-19 PROBLEM — R19.8 STRAINING WITH STOOLS: Status: RESOLVED | Noted: 2021-10-14 | Resolved: 2022-07-19

## 2022-07-21 ENCOUNTER — TELEPHONE (OUTPATIENT)
Dept: INTERNAL MEDICINE | Facility: CLINIC | Age: 72
End: 2022-07-21

## 2022-07-21 ENCOUNTER — OFFICE VISIT (OUTPATIENT)
Dept: INTERNAL MEDICINE | Facility: CLINIC | Age: 72
End: 2022-07-21
Payer: MEDICARE

## 2022-07-21 VITALS
WEIGHT: 167.69 LBS | OXYGEN SATURATION: 96 % | HEART RATE: 74 BPM | RESPIRATION RATE: 15 BRPM | BODY MASS INDEX: 26.95 KG/M2 | SYSTOLIC BLOOD PRESSURE: 120 MMHG | DIASTOLIC BLOOD PRESSURE: 68 MMHG | HEIGHT: 66 IN

## 2022-07-21 DIAGNOSIS — M85.851 OSTEOPENIA OF RIGHT HIP: ICD-10-CM

## 2022-07-21 DIAGNOSIS — M15.9 OSTEOARTHRITIS OF MULTIPLE JOINTS, UNSPECIFIED OSTEOARTHRITIS TYPE: ICD-10-CM

## 2022-07-21 DIAGNOSIS — R53.83 FATIGUE, UNSPECIFIED TYPE: ICD-10-CM

## 2022-07-21 DIAGNOSIS — Z12.31 ENCOUNTER FOR SCREENING MAMMOGRAM FOR MALIGNANT NEOPLASM OF BREAST: ICD-10-CM

## 2022-07-21 DIAGNOSIS — N81.6 RECTOCELE, FEMALE: ICD-10-CM

## 2022-07-21 DIAGNOSIS — J84.10 CALCIFIED GRANULOMA OF LUNG: ICD-10-CM

## 2022-07-21 DIAGNOSIS — K59.09 CHRONIC CONSTIPATION: ICD-10-CM

## 2022-07-21 DIAGNOSIS — Z00.00 ANNUAL PHYSICAL EXAM: Primary | ICD-10-CM

## 2022-07-21 DIAGNOSIS — E66.3 OVERWEIGHT (BMI 25.0-29.9): ICD-10-CM

## 2022-07-21 DIAGNOSIS — Z80.0 FAMILY HISTORY OF COLON CANCER: ICD-10-CM

## 2022-07-21 DIAGNOSIS — Z00.00 ENCOUNTER FOR PREVENTIVE HEALTH EXAMINATION: ICD-10-CM

## 2022-07-21 DIAGNOSIS — N81.4 UTEROVAGINAL PROLAPSE: ICD-10-CM

## 2022-07-21 DIAGNOSIS — H25.13 AGE-RELATED NUCLEAR CATARACT OF BOTH EYES: Primary | ICD-10-CM

## 2022-07-21 DIAGNOSIS — E78.5 DYSLIPIDEMIA: ICD-10-CM

## 2022-07-21 DIAGNOSIS — N81.11 CYSTOCELE, MIDLINE: ICD-10-CM

## 2022-07-21 DIAGNOSIS — R94.4 DECREASED CALCULATED GLOMERULAR FILTRATION RATE (GFR): ICD-10-CM

## 2022-07-21 PROCEDURE — 3288F PR FALLS RISK ASSESSMENT DOCUMENTED: ICD-10-PCS | Mod: CPTII,S$GLB,, | Performed by: NURSE PRACTITIONER

## 2022-07-21 PROCEDURE — 3288F FALL RISK ASSESSMENT DOCD: CPT | Mod: CPTII,S$GLB,, | Performed by: NURSE PRACTITIONER

## 2022-07-21 PROCEDURE — G0439 PPPS, SUBSEQ VISIT: HCPCS | Mod: S$GLB,,, | Performed by: NURSE PRACTITIONER

## 2022-07-21 PROCEDURE — 1160F RVW MEDS BY RX/DR IN RCRD: CPT | Mod: CPTII,S$GLB,, | Performed by: NURSE PRACTITIONER

## 2022-07-21 PROCEDURE — 1157F ADVNC CARE PLAN IN RCRD: CPT | Mod: CPTII,S$GLB,, | Performed by: NURSE PRACTITIONER

## 2022-07-21 PROCEDURE — 99999 PR PBB SHADOW E&M-EST. PATIENT-LVL V: ICD-10-PCS | Mod: PBBFAC,,, | Performed by: NURSE PRACTITIONER

## 2022-07-21 PROCEDURE — 1100F PTFALLS ASSESS-DOCD GE2>/YR: CPT | Mod: CPTII,S$GLB,, | Performed by: NURSE PRACTITIONER

## 2022-07-21 PROCEDURE — 1170F FXNL STATUS ASSESSED: CPT | Mod: CPTII,S$GLB,, | Performed by: NURSE PRACTITIONER

## 2022-07-21 PROCEDURE — 1159F MED LIST DOCD IN RCRD: CPT | Mod: CPTII,S$GLB,, | Performed by: NURSE PRACTITIONER

## 2022-07-21 PROCEDURE — 99999 PR PBB SHADOW E&M-EST. PATIENT-LVL V: CPT | Mod: PBBFAC,,, | Performed by: NURSE PRACTITIONER

## 2022-07-21 PROCEDURE — 1126F PR PAIN SEVERITY QUANTIFIED, NO PAIN PRESENT: ICD-10-PCS | Mod: CPTII,S$GLB,, | Performed by: NURSE PRACTITIONER

## 2022-07-21 PROCEDURE — 1126F AMNT PAIN NOTED NONE PRSNT: CPT | Mod: CPTII,S$GLB,, | Performed by: NURSE PRACTITIONER

## 2022-07-21 PROCEDURE — 1170F PR FUNCTIONAL STATUS ASSESSED: ICD-10-PCS | Mod: CPTII,S$GLB,, | Performed by: NURSE PRACTITIONER

## 2022-07-21 PROCEDURE — G0439 PR MEDICARE ANNUAL WELLNESS SUBSEQUENT VISIT: ICD-10-PCS | Mod: S$GLB,,, | Performed by: NURSE PRACTITIONER

## 2022-07-21 PROCEDURE — 1100F PR PT FALLS ASSESS DOC 2+ FALLS/FALL W/INJURY/YR: ICD-10-PCS | Mod: CPTII,S$GLB,, | Performed by: NURSE PRACTITIONER

## 2022-07-21 PROCEDURE — 1160F PR REVIEW ALL MEDS BY PRESCRIBER/CLIN PHARMACIST DOCUMENTED: ICD-10-PCS | Mod: CPTII,S$GLB,, | Performed by: NURSE PRACTITIONER

## 2022-07-21 PROCEDURE — 3008F PR BODY MASS INDEX (BMI) DOCUMENTED: ICD-10-PCS | Mod: CPTII,S$GLB,, | Performed by: NURSE PRACTITIONER

## 2022-07-21 PROCEDURE — 1159F PR MEDICATION LIST DOCUMENTED IN MEDICAL RECORD: ICD-10-PCS | Mod: CPTII,S$GLB,, | Performed by: NURSE PRACTITIONER

## 2022-07-21 PROCEDURE — 1157F PR ADVANCE CARE PLAN OR EQUIV PRESENT IN MEDICAL RECORD: ICD-10-PCS | Mod: CPTII,S$GLB,, | Performed by: NURSE PRACTITIONER

## 2022-07-21 PROCEDURE — 3008F BODY MASS INDEX DOCD: CPT | Mod: CPTII,S$GLB,, | Performed by: NURSE PRACTITIONER

## 2022-07-21 NOTE — PATIENT INSTRUCTIONS
Counseling and Referral of Other Preventative  (Italic type indicates deductible and co-insurance are waived)    Patient Name: Charu Lopez  Today's Date: 7/21/2022    Health Maintenance       Date Due Completion Date    Mammogram Given external order   6/24/2021    COVID-19 Vaccine (4 - Booster for Pfizer series) 07/26/2022 (Originally 2/28/2022)     10/29/2021    Shingles Vaccine (1 of 2) 07/21/2023 (Originally 1/11/2002) ---    Influenza Vaccine (1) 09/01/2022   ---    Colorectal Cancer Screening 02/24/2023 2/24/2020        DEXA Scan 08/06/2023 8/6/2021        Lipid Panel 08/26/2022 8/26/2021    TETANUS VACCINE 01/05/2028 1/5/2018        Annual PCP & annual labs due in October          Orders Placed This Encounter   Procedures    Mammo Digital Screening Bilat w/ Lorenzo     The following information is provided to all patients.  This information is to help you find resources for any of the problems found today that may be affecting your health:                Living healthy guide: www.[facility].[place].gov      Understanding Diabetes: www.diabetes.org      Eating healthy: www.cdc.gov/healthyweight      [facility] home safety checklist: www.cdc.gov/steadi/patient.html      Agency on Aging: www.goea.[place].gov      Alcoholics anonymous (AA): www.aa.org      Physical Activity: www.fred.nih.gov/lb5nslp      Tobacco use: www.quitwithusla.org

## 2022-07-21 NOTE — TELEPHONE ENCOUNTER
----- Message from Fay Collins sent at 7/21/2022 12:37 PM CDT -----  Patient scheduled their Annual Physical    Date of doctor appointment:  10/28/2022    Comments: If labs are needed please contact to schedule.    Thank you

## 2022-07-21 NOTE — PROGRESS NOTES
"Charu Lopez presented for a  Medicare AWV and comprehensive Health Risk Assessment today. The following components were reviewed and updated:    · Medical history  · Family History  · Social history  · Allergies and Current Medications  · Health Risk Assessment  · Health Maintenance  · Care Team     ** See Completed Assessments for Annual Wellness Visit within the encounter summary.**       The following assessments were completed:  · Living Situation  · CAGE  · Depression Screening  · Timed Get Up and Go  · Whisper Test  · Cognitive Function Screening  · Nutrition Screening  · ADL Screening  · PAQ Screening    Vitals:    07/21/22 1009   BP: 120/68   BP Location: Left arm   Pulse: 74   Resp: 15   SpO2: 96%   Weight: 76.1 kg (167 lb 11.2 oz)   Height: 5' 6" (1.676 m)     Body mass index is 27.07 kg/m².  Physical Exam  Constitutional:       Appearance: She is well-developed.      Comments: In appearance than age   HENT:      Head: Normocephalic and atraumatic.      Comments: Wears face mask     Right Ear: External ear normal.      Left Ear: External ear normal.   Eyes:      General: No scleral icterus.  Neck:      Vascular: No carotid bruit.   Cardiovascular:      Rate and Rhythm: Normal rate and regular rhythm.   Pulmonary:      Breath sounds: Normal breath sounds.   Abdominal:      Palpations: Abdomen is soft.   Musculoskeletal:         General: Normal range of motion.   Skin:     General: Skin is warm and dry.   Neurological:      Mental Status: She is alert and oriented to person, place, and time.      Motor: No abnormal muscle tone.      Deep Tendon Reflexes: Reflexes are normal and symmetric.   Psychiatric:         Mood and Affect: Mood normal.         Behavior: Behavior normal.         Thought Content: Thought content normal.         Judgment: Judgment normal.           Lab Results   Component Value Date    HGBA1C 5.8 03/22/2017    CHOL 105 (L) 08/26/2021    HDL 61 08/26/2021    LDLCALC 38.0 (L) " 08/26/2021    TRIG 30 08/26/2021    CHOLHDL 58.1 (H) 08/26/2021      Results for orders placed in visit on 08/06/21    DXA Bone Density Spine And Hip    Narrative  EXAMINATION:  DEXA BONE DENSITY SPINE HIP    CLINICAL HISTORY:  Other specified disorders of bone density and structure, unspecified thigh.  70 y/o female with no history of fractures.  She had menopausal symptoms at 48 y/o.  She is taking Vit D and Multivitamin.  She does not exercise or smoke.    TECHNIQUE:  DXA specification: [employer] A ([id number])    Bone Mineral Density scanning was performed over the hip and lumbar spine.    Review of the images confirms satisfactory positioning and technique.    COMPARISON:  [facility]    FINDINGS:  Lumbar spine (L1-L4):              BMD is 1.055 g/cm2, T-score is 0.1, and Z-score is 2.2.    Total hip:                                BMD is 0.951 g/cm2, T-score is 0.1, and Z-score is 1.5.    Femoral neck:                          BMD is 0.763 g/cm2, T-score is -0.8, and Z-score is 1.0.    Distal 1/3 radius:                      Not applicable    Impression  *Normal bone mineral density  *  RECOMMENDATIONS:  *Daily calcium intake 0824-2077 mg, dietary sources preferred; Vitamin D 7229-2753 IU daily.  *Weight bearing exercise and fall precautions.  *No additional pharmacologic therapy recommended at this time.  *Repeat BMD in 2 years    EXPLANATION OF RESULTS:  T-score compares these results to the average bone density of a 20-29 year-old of the same gender.    Z-score compares this result to the average bone density to people of the same age, gender, and race.    The amounts indicate the number of standard deviations above or below the mean.    * Osteoporosis is generally defined as having a T-score between less than or equal to -2.5.    * Low bone mass (osteopenia) is generally defined as having a T-score between -1.0 and -2.5.    * The normal range is  generally defined as having a T-score greater than or equal to -1.0.    * Calculated FRAX scores for fracture risk prediction may not be accurate in the setting of certain clinical factors such as pharmacologic therapy for osteoporosis, prior fragility fractures, high dose glucocorticoid use.      Electronically signed by: Jaycee Hu MD  Date:    08/16/2021  Time:    12:31    No results found for this or any previous visit.                Diagnoses and health risks identified today and associated recommendations/orders:    1. Age-related nuclear cataract of both eyes  Stable- followed by ophth    2. Osteopenia of right hip  Stable- followed by PCP    3. Osteoarthritis of multiple joints, unspecified osteoarthritis type  Stable- followed by PCP    4. Decreased calculated glomerular filtration rate (GFR)  Stable- followed by PCP    5. Cystocele, midline  Stable- followed by PCP,GYN    6. Chronic constipation  Stable- followed by PCP    7. Calcified granuloma of lung  Stable- followed by PCP    8. Rectocele, female  Stable- followed by PCP,GYN    9. Uterovaginal prolapse  Stable- followed by PCP    10. Family history of colon cancer  Stable- followed by PCP    11. Encounter for screening mammogram for malignant neoplasm of breast  Stable- followed by PCP  - Mammo Digital Screening Bilat w/ Lorenzo; Future  - Mammo Digital Screening Bilat w/ Lorenzo    12. Overweight (BMI 25.0-29.9)  Improving- BMI information provided-followed by PCP    13. Encounter for preventive health examination  Here for Health Risk Assessment/Annual Wellness Visit.  Health maintenance reviewed and updated. Follow up in one year.         Provided Charu Brandtne with a 5-10 year written screening schedule and personal prevention plan. Recommendations were developed using the USPSTF age appropriate recommendations. Education, counseling, and referrals were provided as needed. After Visit Summary printed and given to patient which includes a list of  additional screenings\tests needed.    Follow up in about 1 year (around 7/21/2023).    Ange Ratliff NP   I offered to discuss advanced care planning, including how to pick a person who would make decisions for you if you were unable to make them for yourself, called a health care power of , and what kind of decisions you might make such as use of life sustaining treatments such as ventilators and tube feeding when faced with a life limiting illness recorded on a living will that they will need to know. (How you want to be cared for as you near the end of your natural life)     X Patient is interested in learning more about how to make advanced directives.  I provided them paperwork and offered to discuss this with them.

## 2022-08-03 ENCOUNTER — PATIENT MESSAGE (OUTPATIENT)
Dept: INTERNAL MEDICINE | Facility: CLINIC | Age: 72
End: 2022-08-03
Payer: MEDICARE

## 2022-08-08 ENCOUNTER — PATIENT MESSAGE (OUTPATIENT)
Dept: INTERNAL MEDICINE | Facility: CLINIC | Age: 72
End: 2022-08-08
Payer: MEDICARE

## 2022-08-24 ENCOUNTER — PATIENT MESSAGE (OUTPATIENT)
Dept: ADMINISTRATIVE | Facility: HOSPITAL | Age: 72
End: 2022-08-24
Payer: MEDICARE

## 2022-09-15 ENCOUNTER — PATIENT MESSAGE (OUTPATIENT)
Dept: INTERNAL MEDICINE | Facility: CLINIC | Age: 72
End: 2022-09-15
Payer: MEDICARE

## 2022-09-15 ENCOUNTER — TELEPHONE (OUTPATIENT)
Dept: INTERNAL MEDICINE | Facility: CLINIC | Age: 72
End: 2022-09-15
Payer: MEDICARE

## 2022-09-19 ENCOUNTER — TELEPHONE (OUTPATIENT)
Dept: INTERNAL MEDICINE | Facility: CLINIC | Age: 72
End: 2022-09-19
Payer: MEDICARE

## 2022-10-10 ENCOUNTER — PATIENT MESSAGE (OUTPATIENT)
Dept: ADMINISTRATIVE | Facility: HOSPITAL | Age: 72
End: 2022-10-10
Payer: MEDICARE

## 2022-10-20 ENCOUNTER — LAB VISIT (OUTPATIENT)
Dept: LAB | Facility: HOSPITAL | Age: 72
End: 2022-10-20
Attending: INTERNAL MEDICINE
Payer: MEDICARE

## 2022-10-20 DIAGNOSIS — R53.83 FATIGUE, UNSPECIFIED TYPE: ICD-10-CM

## 2022-10-20 DIAGNOSIS — Z00.00 ANNUAL PHYSICAL EXAM: ICD-10-CM

## 2022-10-20 DIAGNOSIS — E78.5 DYSLIPIDEMIA: ICD-10-CM

## 2022-10-20 LAB
AMORPH CRY UR QL COMP ASSIST: NORMAL
MICROSCOPIC COMMENT: NORMAL

## 2022-10-20 PROCEDURE — 81001 URINALYSIS AUTO W/SCOPE: CPT | Performed by: INTERNAL MEDICINE

## 2022-10-28 ENCOUNTER — OFFICE VISIT (OUTPATIENT)
Dept: INTERNAL MEDICINE | Facility: CLINIC | Age: 72
End: 2022-10-28
Payer: MEDICARE

## 2022-10-28 DIAGNOSIS — Z00.00 ANNUAL PHYSICAL EXAM: Primary | ICD-10-CM

## 2022-10-28 DIAGNOSIS — R07.9 CHEST PAIN, UNSPECIFIED TYPE: ICD-10-CM

## 2022-10-28 PROBLEM — R94.4 DECREASED CALCULATED GLOMERULAR FILTRATION RATE (GFR): Status: RESOLVED | Noted: 2020-01-23 | Resolved: 2022-10-28

## 2022-10-28 PROCEDURE — 99397 PER PM REEVAL EST PAT 65+ YR: CPT | Mod: S$GLB,,, | Performed by: INTERNAL MEDICINE

## 2022-10-28 PROCEDURE — 3079F PR MOST RECENT DIASTOLIC BLOOD PRESSURE 80-89 MM HG: ICD-10-PCS | Mod: CPTII,S$GLB,, | Performed by: INTERNAL MEDICINE

## 2022-10-28 PROCEDURE — 1159F MED LIST DOCD IN RCRD: CPT | Mod: CPTII,S$GLB,, | Performed by: INTERNAL MEDICINE

## 2022-10-28 PROCEDURE — 1126F PR PAIN SEVERITY QUANTIFIED, NO PAIN PRESENT: ICD-10-PCS | Mod: CPTII,S$GLB,, | Performed by: INTERNAL MEDICINE

## 2022-10-28 PROCEDURE — 1126F AMNT PAIN NOTED NONE PRSNT: CPT | Mod: CPTII,S$GLB,, | Performed by: INTERNAL MEDICINE

## 2022-10-28 PROCEDURE — 99999 PR PBB SHADOW E&M-EST. PATIENT-LVL III: CPT | Mod: PBBFAC,,, | Performed by: INTERNAL MEDICINE

## 2022-10-28 PROCEDURE — 93005 EKG 12-LEAD: ICD-10-PCS | Mod: S$GLB,,, | Performed by: INTERNAL MEDICINE

## 2022-10-28 PROCEDURE — 3074F SYST BP LT 130 MM HG: CPT | Mod: CPTII,S$GLB,, | Performed by: INTERNAL MEDICINE

## 2022-10-28 PROCEDURE — 93010 EKG 12-LEAD: ICD-10-PCS | Mod: S$GLB,,, | Performed by: INTERNAL MEDICINE

## 2022-10-28 PROCEDURE — 1159F PR MEDICATION LIST DOCUMENTED IN MEDICAL RECORD: ICD-10-PCS | Mod: CPTII,S$GLB,, | Performed by: INTERNAL MEDICINE

## 2022-10-28 PROCEDURE — 1157F ADVNC CARE PLAN IN RCRD: CPT | Mod: CPTII,S$GLB,, | Performed by: INTERNAL MEDICINE

## 2022-10-28 PROCEDURE — 3074F PR MOST RECENT SYSTOLIC BLOOD PRESSURE < 130 MM HG: ICD-10-PCS | Mod: CPTII,S$GLB,, | Performed by: INTERNAL MEDICINE

## 2022-10-28 PROCEDURE — 3288F PR FALLS RISK ASSESSMENT DOCUMENTED: ICD-10-PCS | Mod: CPTII,S$GLB,, | Performed by: INTERNAL MEDICINE

## 2022-10-28 PROCEDURE — 1100F PTFALLS ASSESS-DOCD GE2>/YR: CPT | Mod: CPTII,S$GLB,, | Performed by: INTERNAL MEDICINE

## 2022-10-28 PROCEDURE — 1157F PR ADVANCE CARE PLAN OR EQUIV PRESENT IN MEDICAL RECORD: ICD-10-PCS | Mod: CPTII,S$GLB,, | Performed by: INTERNAL MEDICINE

## 2022-10-28 PROCEDURE — 1160F RVW MEDS BY RX/DR IN RCRD: CPT | Mod: CPTII,S$GLB,, | Performed by: INTERNAL MEDICINE

## 2022-10-28 PROCEDURE — 99999 PR PBB SHADOW E&M-EST. PATIENT-LVL III: ICD-10-PCS | Mod: PBBFAC,,, | Performed by: INTERNAL MEDICINE

## 2022-10-28 PROCEDURE — 3079F DIAST BP 80-89 MM HG: CPT | Mod: CPTII,S$GLB,, | Performed by: INTERNAL MEDICINE

## 2022-10-28 PROCEDURE — 1100F PR PT FALLS ASSESS DOC 2+ FALLS/FALL W/INJURY/YR: ICD-10-PCS | Mod: CPTII,S$GLB,, | Performed by: INTERNAL MEDICINE

## 2022-10-28 PROCEDURE — 93005 ELECTROCARDIOGRAM TRACING: CPT | Mod: S$GLB,,, | Performed by: INTERNAL MEDICINE

## 2022-10-28 PROCEDURE — 1160F PR REVIEW ALL MEDS BY PRESCRIBER/CLIN PHARMACIST DOCUMENTED: ICD-10-PCS | Mod: CPTII,S$GLB,, | Performed by: INTERNAL MEDICINE

## 2022-10-28 PROCEDURE — 99397 PR PREVENTIVE VISIT,EST,65 & OVER: ICD-10-PCS | Mod: S$GLB,,, | Performed by: INTERNAL MEDICINE

## 2022-10-28 PROCEDURE — 93010 ELECTROCARDIOGRAM REPORT: CPT | Mod: S$GLB,,, | Performed by: INTERNAL MEDICINE

## 2022-10-28 PROCEDURE — 3288F FALL RISK ASSESSMENT DOCD: CPT | Mod: CPTII,S$GLB,, | Performed by: INTERNAL MEDICINE

## 2022-10-28 NOTE — PROGRESS NOTES
"Subjective:       Patient ID: Charu Lopez is a 72 y.o. female.    Chief Complaint: Annual Exam    HPI  72 y.o. Female here for annual exam.     Pt c/o a few months of intermittent symptoms of anterior chest wall "twinges" They seem to last a few seconds at a time and usually at rest. No additional cardiopulmonary symptoms, hx of cardiac disease and normal stress test in 2017. No relation to eating.      Vaccines: Influenza (declined); Tetanus (2018); Pneumovax (declined); Shingrix (will consider)  Eye exam: declined  Mammogram: [facility] at [facility]  Gyn exam: 10/21  Colonoscopy: 2/20  DEXA: 8/21(NL)     Exercise: walks  Diet: regular      Past Medical History:  birth: Allergy      Comment:  benadryl  No date: Cataract  No date: Colon polyps  No date: Endometrioma of ovary  No date: Fever blister  1/21/2020: Osteoarthritis-knee, hand  No date: Osteopenia  august 29, 2019: Skin disease  Past Surgical History:  89: BREAST BIOPSY      Comment:  wnl   2/24/2020: COLONOSCOPY; N/A      Comment:  Procedure: COLONOSCOPY;  Surgeon: STEPHANIE Webb MD;                 Location: [address];  Service: Endoscopy;                 Laterality: N/A;  12/12/2012: ENDOMETRIAL BIOPSY  No date: laparatomy 1994 1995: OOPHORECTOMY      Comment:  Left ov removal due to endometrioma  1990s: SKIN BIOPSY  Social History    Socioeconomic History      Marital status: Single      Spouse name: Not on file      Number of children: 2      Years of education: Not on file      Highest education level: Not on file    Occupational History      Occupation:     Tobacco Use      Smoking status: Never Smoker      Smokeless tobacco: Never Used    Substance and Sexual Activity      Alcohol use: No      Drug use: Never      Sexual activity: Not Currently        Comment: , menarche 14,  age     Other Topics      Concerns:        Are you pregnant or think you may be?: No        Breast-feeding: No    Social " History Narrative      Not on file     Social Determinants of Health  Financial Resource Strain:       Difficulty of Paying Living Expenses:   Food Insecurity:       Worried About Running Out of Food in the Last Year:       Ran Out of Food in the Last Year:   Transportation Needs:       Lack of Transportation (Medical):       Lack of Transportation (Non-Medical):   Physical Activity:       Days of Exercise per Week:       Minutes of Exercise per Session:   Stress:       Feeling of Stress :   Social Connections:       Frequency of Communication with Friends and Family:       Frequency of Social Gatherings with Friends and Family:       Attends [employer] Services:       Active Member of Clubs or Organizations:       Attends Club or Organization Meetings:       Marital Status:   Review of patient's allergies indicates:   -- Benadryl [diphenhydramine hcl]    -- Tetracyclines     --  Stomach upset  Review of Systems   Constitutional:  Negative for activity change, appetite change, chills, diaphoresis, fatigue, fever and unexpected weight change.   HENT:  Negative for postnasal drip, rhinorrhea, sinus pressure/congestion, sneezing, sore throat, trouble swallowing and voice change.    Respiratory:  Negative for cough, shortness of breath and wheezing.    Cardiovascular:  Negative for chest pain, palpitations and leg swelling.   Gastrointestinal:  Negative for abdominal pain, blood in stool, constipation, diarrhea, nausea and vomiting.   Genitourinary:  Negative for dysuria.   Musculoskeletal:  Negative for arthralgias and myalgias.   Integumentary:  Negative for rash and wound.   Allergic/Immunologic: Negative for environmental allergies and food allergies.   Hematological:  Negative for adenopathy. Does not bruise/bleed easily.       Objective:      Physical Exam  Constitutional:       General: She is not in acute distress.     Appearance: Normal appearance. She is well-developed. She is not diaphoretic.   HENT:       Head: Normocephalic and atraumatic.      Right Ear: External ear normal.      Left Ear: External ear normal.      Nose: Nose normal.      Mouth/Throat:      Pharynx: No oropharyngeal exudate.   Eyes:      General: No scleral icterus.        Right eye: No discharge.         Left eye: No discharge.      Conjunctiva/sclera: Conjunctivae normal.      Pupils: Pupils are equal, round, and reactive to light.   Neck:      Vascular: No JVD.   Cardiovascular:      Rate and Rhythm: Normal rate and regular rhythm.      Pulses: Normal pulses.      Heart sounds: Normal heart sounds.   Pulmonary:      Effort: Pulmonary effort is normal. No respiratory distress.      Breath sounds: Normal breath sounds. No wheezing, rhonchi or rales.   Abdominal:      General: Bowel sounds are normal. There is no distension.      Palpations: Abdomen is soft.      Tenderness: There is no abdominal tenderness. There is no guarding or rebound.   Musculoskeletal:      Cervical back: Neck supple.      Right lower leg: No edema.      Left lower leg: No edema.   Lymphadenopathy:      Cervical: No cervical adenopathy.   Skin:     General: Skin is warm and dry.      Coloration: Skin is not pale.      Findings: No rash.   Neurological:      General: No focal deficit present.      Mental Status: She is alert and oriented to person, place, and time.      Gait: Gait normal.   Psychiatric:         Behavior: Behavior normal.         Thought Content: Thought content normal.       Assessment:       Problem List Items Addressed This Visit    None  Visit Diagnoses       Annual physical exam    -  Primary    Chest pain, unspecified type        Relevant Orders    EKG 12-lead            Plan:    Blood work reviewed with pt     Check EKG

## 2023-01-17 DIAGNOSIS — R07.9 CHEST PAIN, UNSPECIFIED TYPE: Primary | ICD-10-CM

## 2023-01-26 ENCOUNTER — HOSPITAL ENCOUNTER (OUTPATIENT)
Dept: RADIOLOGY | Facility: HOSPITAL | Age: 73
Discharge: HOME | End: 2023-01-26
Attending: INTERNAL MEDICINE
Payer: MEDICARE

## 2023-01-26 ENCOUNTER — HOSPITAL ENCOUNTER (OUTPATIENT)
Dept: CARDIOLOGY | Facility: CLINIC | Age: 73
Discharge: HOME | End: 2023-01-26
Payer: MEDICARE

## 2023-01-26 ENCOUNTER — OFFICE VISIT (OUTPATIENT)
Dept: CARDIOLOGY | Facility: CLINIC | Age: 73
End: 2023-01-26
Payer: MEDICARE

## 2023-01-26 VITALS
OXYGEN SATURATION: 98 % | DIASTOLIC BLOOD PRESSURE: 72 MMHG | HEART RATE: 70 BPM | BODY MASS INDEX: 28.75 KG/M2 | HEIGHT: 63 IN | SYSTOLIC BLOOD PRESSURE: 135 MMHG | WEIGHT: 162.25 LBS

## 2023-01-26 DIAGNOSIS — Z13.6 ENCOUNTER FOR SCREENING FOR CARDIOVASCULAR DISORDERS: ICD-10-CM

## 2023-01-26 DIAGNOSIS — E66.3 OVERWEIGHT (BMI 25.0-29.9): ICD-10-CM

## 2023-01-26 DIAGNOSIS — R07.89 ATYPICAL CHEST PAIN: Primary | ICD-10-CM

## 2023-01-26 DIAGNOSIS — R07.9 CHEST PAIN, UNSPECIFIED TYPE: ICD-10-CM

## 2023-01-26 DIAGNOSIS — R07.89 ATYPICAL CHEST PAIN: ICD-10-CM

## 2023-01-26 PROCEDURE — 75571 CT HRT W/O DYE W/CA TEST: CPT | Mod: TC,HCNC

## 2023-01-26 PROCEDURE — 1157F PR ADVANCE CARE PLAN OR EQUIV PRESENT IN MEDICAL RECORD: ICD-10-PCS | Mod: HCNC,CPTII,S$GLB, | Performed by: INTERNAL MEDICINE

## 2023-01-26 PROCEDURE — 99999 PR PBB SHADOW E&M-EST. PATIENT-LVL IV: CPT | Mod: PBBFAC,HCNC,, | Performed by: INTERNAL MEDICINE

## 2023-01-26 PROCEDURE — 3008F BODY MASS INDEX DOCD: CPT | Mod: HCNC,CPTII,S$GLB, | Performed by: INTERNAL MEDICINE

## 2023-01-26 PROCEDURE — 1101F PT FALLS ASSESS-DOCD LE1/YR: CPT | Mod: HCNC,CPTII,S$GLB, | Performed by: INTERNAL MEDICINE

## 2023-01-26 PROCEDURE — 99999 PR PBB SHADOW E&M-EST. PATIENT-LVL IV: ICD-10-PCS | Mod: PBBFAC,HCNC,, | Performed by: INTERNAL MEDICINE

## 2023-01-26 PROCEDURE — 99203 PR OFFICE/OUTPT VISIT, NEW, LEVL III, 30-44 MIN: ICD-10-PCS | Mod: HCNC,25,S$GLB, | Performed by: INTERNAL MEDICINE

## 2023-01-26 PROCEDURE — 99203 OFFICE O/P NEW LOW 30 MIN: CPT | Mod: HCNC,25,S$GLB, | Performed by: INTERNAL MEDICINE

## 2023-01-26 PROCEDURE — 3075F PR MOST RECENT SYSTOLIC BLOOD PRESS GE 130-139MM HG: ICD-10-PCS | Mod: HCNC,CPTII,S$GLB, | Performed by: INTERNAL MEDICINE

## 2023-01-26 PROCEDURE — 93000 ELECTROCARDIOGRAM COMPLETE: CPT | Mod: HCNC,S$GLB,, | Performed by: INTERNAL MEDICINE

## 2023-01-26 PROCEDURE — 3075F SYST BP GE 130 - 139MM HG: CPT | Mod: HCNC,CPTII,S$GLB, | Performed by: INTERNAL MEDICINE

## 2023-01-26 PROCEDURE — 75571 CT HRT W/O DYE W/CA TEST: CPT | Mod: 26,HCNC,, | Performed by: RADIOLOGY

## 2023-01-26 PROCEDURE — 3288F PR FALLS RISK ASSESSMENT DOCUMENTED: ICD-10-PCS | Mod: HCNC,CPTII,S$GLB, | Performed by: INTERNAL MEDICINE

## 2023-01-26 PROCEDURE — 3008F PR BODY MASS INDEX (BMI) DOCUMENTED: ICD-10-PCS | Mod: HCNC,CPTII,S$GLB, | Performed by: INTERNAL MEDICINE

## 2023-01-26 PROCEDURE — 1159F MED LIST DOCD IN RCRD: CPT | Mod: HCNC,CPTII,S$GLB, | Performed by: INTERNAL MEDICINE

## 2023-01-26 PROCEDURE — 1160F PR REVIEW ALL MEDS BY PRESCRIBER/CLIN PHARMACIST DOCUMENTED: ICD-10-PCS | Mod: HCNC,CPTII,S$GLB, | Performed by: INTERNAL MEDICINE

## 2023-01-26 PROCEDURE — 75571 CT CALCIUM SCORING CARDIAC: ICD-10-PCS | Mod: 26,HCNC,, | Performed by: RADIOLOGY

## 2023-01-26 PROCEDURE — 1126F AMNT PAIN NOTED NONE PRSNT: CPT | Mod: HCNC,CPTII,S$GLB, | Performed by: INTERNAL MEDICINE

## 2023-01-26 PROCEDURE — 1101F PR PT FALLS ASSESS DOC 0-1 FALLS W/OUT INJ PAST YR: ICD-10-PCS | Mod: HCNC,CPTII,S$GLB, | Performed by: INTERNAL MEDICINE

## 2023-01-26 PROCEDURE — 3288F FALL RISK ASSESSMENT DOCD: CPT | Mod: HCNC,CPTII,S$GLB, | Performed by: INTERNAL MEDICINE

## 2023-01-26 PROCEDURE — 3078F DIAST BP <80 MM HG: CPT | Mod: HCNC,CPTII,S$GLB, | Performed by: INTERNAL MEDICINE

## 2023-01-26 PROCEDURE — 1159F PR MEDICATION LIST DOCUMENTED IN MEDICAL RECORD: ICD-10-PCS | Mod: HCNC,CPTII,S$GLB, | Performed by: INTERNAL MEDICINE

## 2023-01-26 PROCEDURE — 93000 EKG 12-LEAD: ICD-10-PCS | Mod: HCNC,S$GLB,, | Performed by: INTERNAL MEDICINE

## 2023-01-26 PROCEDURE — 1126F PR PAIN SEVERITY QUANTIFIED, NO PAIN PRESENT: ICD-10-PCS | Mod: HCNC,CPTII,S$GLB, | Performed by: INTERNAL MEDICINE

## 2023-01-26 PROCEDURE — 1160F RVW MEDS BY RX/DR IN RCRD: CPT | Mod: HCNC,CPTII,S$GLB, | Performed by: INTERNAL MEDICINE

## 2023-01-26 PROCEDURE — 3078F PR MOST RECENT DIASTOLIC BLOOD PRESSURE < 80 MM HG: ICD-10-PCS | Mod: HCNC,CPTII,S$GLB, | Performed by: INTERNAL MEDICINE

## 2023-01-26 PROCEDURE — 1157F ADVNC CARE PLAN IN RCRD: CPT | Mod: HCNC,CPTII,S$GLB, | Performed by: INTERNAL MEDICINE

## 2023-01-26 NOTE — PROGRESS NOTES
"Subjective:   Patient ID:  Charu Lopez is a 73 y.o. female who presents for evaluation of CVD    HPI: She is here for CVD risk The patient has no SOB, TIA, palpitations, syncope or pre-syncope.PCP in October said "Pt c/o a few months of intermittent symptoms of anterior chest wall "twinges" or sharp pain They seem to last a few seconds at a time and usually at rest. No additional cardiopulmonary symptoms, hx of cardiac disease and normal stress test in 2017. No relation to eating". /80 in primary recently.Some left arm pain, some pain and discoloration at times right index finget and right calf      Review of Systems   Constitutional: Negative for chills, decreased appetite, diaphoresis, fever, malaise/fatigue, night sweats, weight gain and weight loss.   HENT:  Negative for congestion, hoarse voice, nosebleeds, sore throat and tinnitus.    Eyes:  Negative for blurred vision, double vision, vision loss in left eye, vision loss in right eye, visual disturbance and visual halos.   Cardiovascular:  Positive for chest pain. Negative for claudication, cyanosis, dyspnea on exertion, irregular heartbeat, leg swelling, near-syncope, orthopnea, palpitations, paroxysmal nocturnal dyspnea and syncope.   Respiratory:  Negative for cough, hemoptysis, shortness of breath, sleep disturbances due to breathing, snoring, sputum production and wheezing.    Endocrine: Negative for cold intolerance, heat intolerance, polydipsia, polyphagia and polyuria.   Hematologic/Lymphatic: Negative for adenopathy and bleeding problem. Does not bruise/bleed easily.   Skin:  Negative for color change, dry skin, flushing, itching, nail changes, poor wound healing, rash, skin cancer, suspicious lesions and unusual hair distribution.   Musculoskeletal:  Negative for arthritis, back pain, falls, gout, joint pain, joint swelling, muscle cramps, muscle weakness, myalgias and stiffness.   Gastrointestinal:  Negative for abdominal " "pain, anorexia, change in bowel habit, constipation, diarrhea, dysphagia, heartburn, hematemesis, hematochezia, melena and vomiting.   Genitourinary:  Negative for decreased libido, dysuria, hematuria, hesitancy and urgency.   Neurological:  Negative for excessive daytime sleepiness, dizziness, focal weakness, headaches, light-headedness, loss of balance, numbness, paresthesias, seizures, sensory change, tremors, vertigo and weakness.   Psychiatric/Behavioral:  Negative for altered mental status, depression, hallucinations, memory loss, substance abuse and suicidal ideas. The patient does not have insomnia and is not nervous/anxious.    Allergic/Immunologic: Negative for environmental allergies and hives.     Objective: /72 (BP Location: Left arm, Patient Position: Sitting, BP Method: Medium (Automatic))   Pulse 70   Ht 5' 2.5" (1.588 m)   Wt 73.6 kg (162 lb 4.1 oz)   SpO2 98%   BMI 29.20 kg/m²      Physical Exam  Constitutional:       Appearance: She is well-developed.   HENT:      Head: Normocephalic.   Eyes:      Pupils: Pupils are equal, round, and reactive to light.   Neck:      Thyroid: No thyromegaly.      Vascular: Normal carotid pulses. No carotid bruit, hepatojugular reflux or JVD.   Cardiovascular:      Rate and Rhythm: Normal rate and regular rhythm.      Pulses: Intact distal pulses.      Heart sounds: Normal heart sounds. No murmur heard.    No friction rub. No gallop.   Pulmonary:      Effort: Pulmonary effort is normal. No tachypnea or respiratory distress.      Breath sounds: Normal breath sounds. No wheezing or rales.   Chest:      Chest wall: No tenderness.   Abdominal:      General: Bowel sounds are normal. There is no distension.      Palpations: Abdomen is soft. There is no mass.      Tenderness: There is no abdominal tenderness. There is no guarding or rebound.   Musculoskeletal:         General: No tenderness. Normal range of motion.      Cervical back: Normal range of motion. "   Lymphadenopathy:      Cervical: No cervical adenopathy.   Skin:     General: Skin is warm.      Findings: No erythema or rash.   Neurological:      Mental Status: She is alert and oriented to person, place, and time.      Cranial Nerves: No cranial nerve deficit.      Coordination: Coordination normal.   Psychiatric:         Behavior: Behavior normal.         Thought Content: Thought content normal.         Judgment: Judgment normal.     Assessment:     1. Atypical chest pain    2. Overweight (BMI 25.0-29.9)    3. Encounter for screening for cardiovascular disorders        Plan:   Discussed diet , achieving and maintaining ideal body weight, and exercise.   We reviewed meds in detail.  Reassured-Discussed goals, options, plan.  Could do CAC and /or stress    Charu Stein was seen today for establish care, follow-up, chest pain, palpitations and leg swelling.    Diagnoses and all orders for this visit:    Atypical chest pain  -     CT Cardiac Scoring; Future; Expected date: 01/27/2023    Overweight (BMI 25.0-29.9)  -     CT Cardiac Scoring; Future; Expected date: 01/27/2023    Encounter for screening for cardiovascular disorders  -     CT Cardiac Scoring; Future; Expected date: 01/27/2023            Follow up for CAC today if possible cash pay.

## 2023-01-26 NOTE — PATIENT INSTRUCTIONS
Discussed diet , achieving and maintaining ideal body weight, and exercise.   We reviewed meds in detail.  Reassured-Discussed goals, options, plan.  Could do CAC and /or stress

## 2023-01-27 ENCOUNTER — PATIENT MESSAGE (OUTPATIENT)
Dept: CARDIOLOGY | Facility: CLINIC | Age: 73
End: 2023-01-27
Payer: MEDICARE

## 2023-01-27 PROBLEM — R91.1 INCIDENTAL PULMONARY NODULE: Status: ACTIVE | Noted: 2023-01-27

## 2023-01-31 ENCOUNTER — TELEPHONE (OUTPATIENT)
Dept: INTERNAL MEDICINE | Facility: CLINIC | Age: 73
End: 2023-01-31
Payer: MEDICARE

## 2023-01-31 DIAGNOSIS — R91.1 LUNG NODULE: Primary | ICD-10-CM

## 2023-01-31 NOTE — TELEPHONE ENCOUNTER
----- Message from Marcelino Thomas MD sent at 1/27/2023  7:46 AM CST -----  Your results show perfect 0 which is terrific for age 73 . There was a moderately enlarged pulmonary nodule in the right upper lung. We see a lot of pulmonary nodules and do not even worry about ones under 6 mm in a non-smoker and do repeat scans for the bigger ones . I am happy to arrange the repeat lung scans or if she prefers I can leave this to Dr Luis or since her nodule is described as a bit more complicated than the usual small nodule, we can send you to a pulmonary doctor to coordinate . If we order ourselves , we could get repeat CT chest at 9 and 21 months .By the way, so far I have never picked up a lung cancer from all the lung nodules I have identified on these scans even from several with more complicated looking nodules than this one , so I strongly predict that this will also not prove to be anything , but we still should not ignore.      Please contact me if you have any additional concerns.    Sincerely,    Marcelino Thomas

## 2023-01-31 NOTE — TELEPHONE ENCOUNTER
Advise pt I want her to see Pulmonary for the lung nodule seen on CT calcium score(done by cardio) on 1/26/23

## 2023-02-01 NOTE — TELEPHONE ENCOUNTER
Patient instructed to follow up with pulmonary. Pt would like to see Dr Boyer. Phone number for scheduling provided.

## 2023-02-07 DIAGNOSIS — Z00.00 ENCOUNTER FOR MEDICARE ANNUAL WELLNESS EXAM: ICD-10-CM

## 2023-02-09 DIAGNOSIS — Z00.00 ENCOUNTER FOR MEDICARE ANNUAL WELLNESS EXAM: ICD-10-CM

## 2023-02-13 ENCOUNTER — PATIENT MESSAGE (OUTPATIENT)
Dept: OPTOMETRY | Facility: CLINIC | Age: 73
End: 2023-02-13
Payer: MEDICARE

## 2023-02-15 ENCOUNTER — TELEPHONE (OUTPATIENT)
Dept: OPTOMETRY | Facility: CLINIC | Age: 73
End: 2023-02-15
Payer: MEDICARE

## 2023-02-16 ENCOUNTER — OFFICE VISIT (OUTPATIENT)
Dept: PULMONOLOGY | Facility: CLINIC | Age: 73
End: 2023-02-16
Payer: MEDICARE

## 2023-02-16 VITALS
SYSTOLIC BLOOD PRESSURE: 130 MMHG | HEIGHT: 62 IN | OXYGEN SATURATION: 97 % | WEIGHT: 164 LBS | DIASTOLIC BLOOD PRESSURE: 70 MMHG | HEART RATE: 90 BPM | BODY MASS INDEX: 30.18 KG/M2

## 2023-02-16 DIAGNOSIS — R91.1 SOLITARY PULMONARY NODULE: Primary | ICD-10-CM

## 2023-02-16 DIAGNOSIS — R91.1 INCIDENTAL PULMONARY NODULE: ICD-10-CM

## 2023-02-16 DIAGNOSIS — R91.1 LUNG NODULE: ICD-10-CM

## 2023-02-16 DIAGNOSIS — J92.0 PLEURAL PLAQUE DUE TO ASBESTOS EXPOSURE: ICD-10-CM

## 2023-02-16 DIAGNOSIS — R09.82 POST-NASAL DRIP: ICD-10-CM

## 2023-02-16 PROCEDURE — 99999 PR PBB SHADOW E&M-EST. PATIENT-LVL IV: ICD-10-PCS | Mod: PBBFAC,HCNC,, | Performed by: INTERNAL MEDICINE

## 2023-02-16 PROCEDURE — 3078F DIAST BP <80 MM HG: CPT | Mod: HCNC,CPTII,S$GLB, | Performed by: INTERNAL MEDICINE

## 2023-02-16 PROCEDURE — 3008F PR BODY MASS INDEX (BMI) DOCUMENTED: ICD-10-PCS | Mod: HCNC,CPTII,S$GLB, | Performed by: INTERNAL MEDICINE

## 2023-02-16 PROCEDURE — 1101F PT FALLS ASSESS-DOCD LE1/YR: CPT | Mod: HCNC,CPTII,S$GLB, | Performed by: INTERNAL MEDICINE

## 2023-02-16 PROCEDURE — 1157F ADVNC CARE PLAN IN RCRD: CPT | Mod: HCNC,CPTII,S$GLB, | Performed by: INTERNAL MEDICINE

## 2023-02-16 PROCEDURE — 1126F PR PAIN SEVERITY QUANTIFIED, NO PAIN PRESENT: ICD-10-PCS | Mod: HCNC,CPTII,S$GLB, | Performed by: INTERNAL MEDICINE

## 2023-02-16 PROCEDURE — 3288F FALL RISK ASSESSMENT DOCD: CPT | Mod: HCNC,CPTII,S$GLB, | Performed by: INTERNAL MEDICINE

## 2023-02-16 PROCEDURE — 3288F PR FALLS RISK ASSESSMENT DOCUMENTED: ICD-10-PCS | Mod: HCNC,CPTII,S$GLB, | Performed by: INTERNAL MEDICINE

## 2023-02-16 PROCEDURE — 99204 OFFICE O/P NEW MOD 45 MIN: CPT | Mod: HCNC,S$GLB,, | Performed by: INTERNAL MEDICINE

## 2023-02-16 PROCEDURE — 1159F PR MEDICATION LIST DOCUMENTED IN MEDICAL RECORD: ICD-10-PCS | Mod: HCNC,CPTII,S$GLB, | Performed by: INTERNAL MEDICINE

## 2023-02-16 PROCEDURE — 3078F PR MOST RECENT DIASTOLIC BLOOD PRESSURE < 80 MM HG: ICD-10-PCS | Mod: HCNC,CPTII,S$GLB, | Performed by: INTERNAL MEDICINE

## 2023-02-16 PROCEDURE — 1157F PR ADVANCE CARE PLAN OR EQUIV PRESENT IN MEDICAL RECORD: ICD-10-PCS | Mod: HCNC,CPTII,S$GLB, | Performed by: INTERNAL MEDICINE

## 2023-02-16 PROCEDURE — 3008F BODY MASS INDEX DOCD: CPT | Mod: HCNC,CPTII,S$GLB, | Performed by: INTERNAL MEDICINE

## 2023-02-16 PROCEDURE — 1159F MED LIST DOCD IN RCRD: CPT | Mod: HCNC,CPTII,S$GLB, | Performed by: INTERNAL MEDICINE

## 2023-02-16 PROCEDURE — 1126F AMNT PAIN NOTED NONE PRSNT: CPT | Mod: HCNC,CPTII,S$GLB, | Performed by: INTERNAL MEDICINE

## 2023-02-16 PROCEDURE — 99204 PR OFFICE/OUTPT VISIT, NEW, LEVL IV, 45-59 MIN: ICD-10-PCS | Mod: HCNC,S$GLB,, | Performed by: INTERNAL MEDICINE

## 2023-02-16 PROCEDURE — 99999 PR PBB SHADOW E&M-EST. PATIENT-LVL IV: CPT | Mod: PBBFAC,HCNC,, | Performed by: INTERNAL MEDICINE

## 2023-02-16 PROCEDURE — 3075F PR MOST RECENT SYSTOLIC BLOOD PRESS GE 130-139MM HG: ICD-10-PCS | Mod: HCNC,CPTII,S$GLB, | Performed by: INTERNAL MEDICINE

## 2023-02-16 PROCEDURE — 1160F PR REVIEW ALL MEDS BY PRESCRIBER/CLIN PHARMACIST DOCUMENTED: ICD-10-PCS | Mod: HCNC,CPTII,S$GLB, | Performed by: INTERNAL MEDICINE

## 2023-02-16 PROCEDURE — 1160F RVW MEDS BY RX/DR IN RCRD: CPT | Mod: HCNC,CPTII,S$GLB, | Performed by: INTERNAL MEDICINE

## 2023-02-16 PROCEDURE — 3075F SYST BP GE 130 - 139MM HG: CPT | Mod: HCNC,CPTII,S$GLB, | Performed by: INTERNAL MEDICINE

## 2023-02-16 PROCEDURE — 1101F PR PT FALLS ASSESS DOC 0-1 FALLS W/OUT INJ PAST YR: ICD-10-PCS | Mod: HCNC,CPTII,S$GLB, | Performed by: INTERNAL MEDICINE

## 2023-02-16 NOTE — PROGRESS NOTES
"Subjective:       Patient ID: Charu Lopez is a 73 y.o. female.    Chief Complaint: Pulmonary Nodules    73 year old lifetime nonsmoker who was exposed to asbestos from her father.  Father had asbestosis and  colon cancer.  Described chest pain that is intermittent and at rest.  Occasional discomfort    Review of Systems   Constitutional:  Negative for fever, chills and night sweats.   HENT:  Negative for sore throat and trouble swallowing.    Respiratory:  Positive for sputum production (occastionally cough up a pellet). Negative for hemoptysis.    Cardiovascular:  Positive for leg swelling (in january had a pain in her calf that went away). Negative for palpitations.   Genitourinary:  Negative for difficulty urinating.   Endocrine:  Negative for cold intolerance and heat intolerance.    Musculoskeletal:  Negative for arthralgias.   Gastrointestinal:  Negative for acid reflux.   Neurological:  Negative for headaches.   Hematological:  Negative for adenopathy.   Psychiatric/Behavioral:  Negative for confusion.      Objective:      Vitals:    23 1356   BP: 130/70   BP Location: Right arm   Patient Position: Sitting   Pulse: 90   SpO2: 97%   Weight: 74.4 kg (164 lb 0.4 oz)   Height: 5' 2" (1.575 m)      Physical Exam   Constitutional: She is oriented to person, place, and time. She appears well-developed and well-nourished.   HENT:   Head: Normocephalic.   Nose: Nose normal.   Cardiovascular: Normal rate.   Pulmonary/Chest: She has no wheezes. She has no rales.   Musculoskeletal:      Cervical back: Normal range of motion and neck supple.   Neurological: She is alert and oriented to person, place, and time.   Skin: Skin is warm and dry.   Psychiatric: She has a normal mood and affect.     Personal Diagnostic Review  CT of chest performed on 2023 without contrast revealed calcified pleural plaques and 8 mm nodule with tree in bud pattern.     Remote CXR 2013 with calcified pleural " plaques  No flowsheet data found.      Assessment:       1. Solitary pulmonary nodule    2. Lung nodule    3. Incidental pulmonary nodule    4. 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 Post-nasal drip          Outpatient Encounter Medications as of 2/16/2023   Medication Sig Dispense Refill    multivitamin (THERAGRAN) per tablet Take 1 tablet by mouth once daily.      permethrin (ELIMITE) 5 % cream APPLY TOPICALLY TO THE AFFECTED AREA 1 TIME FOR 1 DOSE 60 g 0    psyllium 0.52 gram capsule Take 0.52 g by mouth once daily.       No facility-administered encounter medications on file as of 2/16/2023.     Orders Placed This Encounter   Procedures    CT Chest Without Contrast     Standing Status:   Future     Standing Expiration Date:   2/16/2024     Order Specific Question:   May the Radiologist modify the order per protocol to meet the clinical needs of the patient?     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          Incidental pulmonary nodule    Overview     8 mm.  Low risk, three month follow up.         Post-nasal drip    Overview     Simply saline mist to irrigate sinuses daily  flonase 2 sprays per nostril daily.          Other Visit Diagnoses       Solitary pulmonary nodule    -  Primary    Relevant Orders    CT Chest Without Contrast    Lung nodule

## 2023-02-22 ENCOUNTER — PATIENT MESSAGE (OUTPATIENT)
Dept: INTERNAL MEDICINE | Facility: CLINIC | Age: 73
End: 2023-02-22
Payer: MEDICARE

## 2023-02-22 VITALS
SYSTOLIC BLOOD PRESSURE: 120 MMHG | OXYGEN SATURATION: 99 % | BODY MASS INDEX: 25.73 KG/M2 | DIASTOLIC BLOOD PRESSURE: 80 MMHG | HEART RATE: 72 BPM | HEIGHT: 66 IN | TEMPERATURE: 97 F | WEIGHT: 160.13 LBS

## 2023-02-23 ENCOUNTER — PATIENT MESSAGE (OUTPATIENT)
Dept: SURGERY | Facility: CLINIC | Age: 73
End: 2023-02-23
Payer: MEDICARE

## 2023-02-23 ENCOUNTER — TELEPHONE (OUTPATIENT)
Dept: SURGERY | Facility: CLINIC | Age: 73
End: 2023-02-23
Payer: MEDICARE

## 2023-02-23 DIAGNOSIS — Z86.010 HISTORY OF COLON POLYPS: Primary | ICD-10-CM

## 2023-02-23 NOTE — TELEPHONE ENCOUNTER
----- Message from Siddhartha Bang sent at 2/23/2023  9:19 AM CST -----  Contact: @[phone]  Pt is calling in from a missed call, please call to discuss further. Pt states that June 12 is a good day for her.

## 2023-02-23 NOTE — TELEPHONE ENCOUNTER
----- Message from Ivis Graves sent at 2/23/2023  8:25 AM CST -----  Regarding: Colonoscopy  Contact: Pt @ [phone]  Pt is calling to schedule a colonoscopy. Asking for a call back

## 2023-02-23 NOTE — TELEPHONE ENCOUNTER
Spoke with patient, soonest endoscopy telephone appt scheduled for June 26th.  Patient would like colonoscopy on June 12th (Monday). I explained that Dr Webb is in [place] on Mondays. Patient stated that she had seen Dr Miranda and inquired about her doing the scope. Dr Miranda does colonoscopies on Mondays and patient would like to see if she can have her colonoscopy with Dr Miranda on June 12th. Message sent to endoscopy schedulers.

## 2023-03-02 ENCOUNTER — TELEPHONE (OUTPATIENT)
Dept: INTERNAL MEDICINE | Facility: CLINIC | Age: 73
End: 2023-03-02
Payer: MEDICARE

## 2023-03-02 NOTE — TELEPHONE ENCOUNTER
----- Message from Ella Alexandre sent at 3/2/2023 11:43 AM CST -----  Contact: [phone]  Patient would like AWV on 7/21/23.  Please call and advise.    Thank you and have a great day.

## 2023-03-03 ENCOUNTER — OFFICE VISIT (OUTPATIENT)
Dept: OPTOMETRY | Facility: CLINIC | Age: 73
End: 2023-03-03
Payer: COMMERCIAL

## 2023-03-03 DIAGNOSIS — H04.123 DRY EYES, BILATERAL: ICD-10-CM

## 2023-03-03 DIAGNOSIS — H43.812 POSTERIOR VITREOUS DETACHMENT OF LEFT EYE: ICD-10-CM

## 2023-03-03 DIAGNOSIS — H52.4 MYOPIA WITH ASTIGMATISM AND PRESBYOPIA, BILATERAL: Primary | ICD-10-CM

## 2023-03-03 DIAGNOSIS — H57.12 PAIN OF LEFT EYE: ICD-10-CM

## 2023-03-03 DIAGNOSIS — H52.203 MYOPIA WITH ASTIGMATISM AND PRESBYOPIA, BILATERAL: Primary | ICD-10-CM

## 2023-03-03 DIAGNOSIS — H53.19 VITREOUS FLASHES OF BOTH EYES: ICD-10-CM

## 2023-03-03 DIAGNOSIS — H25.13 NUCLEAR SCLEROSIS, BILATERAL: ICD-10-CM

## 2023-03-03 DIAGNOSIS — H52.13 MYOPIA WITH ASTIGMATISM AND PRESBYOPIA, BILATERAL: Primary | ICD-10-CM

## 2023-03-03 PROCEDURE — 92015 DETERMINE REFRACTIVE STATE: CPT | Mod: S$GLB,,, | Performed by: OPTOMETRIST

## 2023-03-03 PROCEDURE — 92015 PR REFRACTION: ICD-10-PCS | Mod: S$GLB,,, | Performed by: OPTOMETRIST

## 2023-03-03 PROCEDURE — 99999 PR PBB SHADOW E&M-EST. PATIENT-LVL III: CPT | Mod: PBBFAC,,, | Performed by: OPTOMETRIST

## 2023-03-03 PROCEDURE — 99999 PR PBB SHADOW E&M-EST. PATIENT-LVL III: ICD-10-PCS | Mod: PBBFAC,,, | Performed by: OPTOMETRIST

## 2023-03-03 PROCEDURE — 92014 COMPRE OPH EXAM EST PT 1/>: CPT | Mod: S$GLB,,, | Performed by: OPTOMETRIST

## 2023-03-03 PROCEDURE — 92014 PR EYE EXAM, EST PATIENT,COMPREHESV: ICD-10-PCS | Mod: S$GLB,,, | Performed by: OPTOMETRIST

## 2023-03-03 NOTE — PROGRESS NOTES
EDGAR    EARL: 02/22 with Dr. Weaver  Chief complaint (CC): Patient is here for annual eye exam today.  Patient   hasn't noticed any vision changes since the last exam.  Glasses still seem   fine Patient states she still  sees flashes same as last exam, no flashes.   Eyes sometimes ache.  OS sometimes hurts when she rubs it.  Glasses? + 1 yr. old  Contacts? -  H/o eye surgery, injections or laser: -  H/o eye injury: -  Known eye conditions? See above  Family h/o eye conditions? -  Eye gtts? AT's BID OU      (-) Flashes (-)  Floaters (-) Mucous   (+)  Tearing (-) Itching (+) Burning   (-) Headaches (+) Eye Pain/discomfort (+) Irritation   (-)  Redness (-) Double vision (-) Blurry vision    Diabetic? -  A1c? -      Last edited by Jill Daly on 3/3/2023 10:19 AM.            Assessment /Plan     For exam results, see Encounter Report.      Myopia with astigmatism and presbyopia, bilateral  SRx released to patient. Patient educated on lens options. Normal ocular health. RTC 1 year for routine exam.     Vitreous flashes of both eyes  Posterior vitreous detachment of left eye  Flashes reported OS only. No e/o h/b/t 360 degrees OU. Monitor for worsening of symptoms or S/Sx of RD.     Nuclear sclerosis, bilateral  Nuclear sclerotic cataract - not visually significant. Observe.    Dry eyes, bilateral  Recommend Systane Ultra or Refresh Optive BID-TID OU to aid with symptoms of dry eyes.    Eye pain, left   Pt reports OS feels achy at times. No e/o ocular pathology. Question of sinus involvement.

## 2023-03-06 ENCOUNTER — PATIENT MESSAGE (OUTPATIENT)
Dept: OPTOMETRY | Facility: CLINIC | Age: 73
End: 2023-03-06
Payer: MEDICARE

## 2023-03-17 ENCOUNTER — PATIENT MESSAGE (OUTPATIENT)
Dept: INFECTIOUS DISEASES | Facility: CLINIC | Age: 73
End: 2023-03-17
Payer: MEDICARE

## 2023-04-25 ENCOUNTER — PATIENT MESSAGE (OUTPATIENT)
Dept: INTERNAL MEDICINE | Facility: CLINIC | Age: 73
End: 2023-04-25
Payer: MEDICARE

## 2023-04-28 ENCOUNTER — HOSPITAL ENCOUNTER (OUTPATIENT)
Dept: RADIOLOGY | Facility: HOSPITAL | Age: 73
Discharge: HOME | End: 2023-04-28
Attending: INTERNAL MEDICINE
Payer: MEDICARE

## 2023-04-28 ENCOUNTER — OFFICE VISIT (OUTPATIENT)
Dept: INTERNAL MEDICINE | Facility: CLINIC | Age: 73
End: 2023-04-28
Payer: MEDICARE

## 2023-04-28 VITALS
TEMPERATURE: 98 F | WEIGHT: 162.5 LBS | HEIGHT: 66 IN | HEART RATE: 72 BPM | RESPIRATION RATE: 15 BRPM | OXYGEN SATURATION: 98 % | BODY MASS INDEX: 26.12 KG/M2 | SYSTOLIC BLOOD PRESSURE: 124 MMHG | DIASTOLIC BLOOD PRESSURE: 72 MMHG

## 2023-04-28 DIAGNOSIS — M79.671 RIGHT FOOT PAIN: ICD-10-CM

## 2023-04-28 DIAGNOSIS — M79.671 RIGHT FOOT PAIN: Primary | ICD-10-CM

## 2023-04-28 PROCEDURE — 3074F SYST BP LT 130 MM HG: CPT | Mod: CPTII,S$GLB,, | Performed by: INTERNAL MEDICINE

## 2023-04-28 PROCEDURE — 99999 PR PBB SHADOW E&M-EST. PATIENT-LVL III: ICD-10-PCS | Mod: PBBFAC,,, | Performed by: INTERNAL MEDICINE

## 2023-04-28 PROCEDURE — 99213 PR OFFICE/OUTPT VISIT, EST, LEVL III, 20-29 MIN: ICD-10-PCS | Mod: S$GLB,,, | Performed by: INTERNAL MEDICINE

## 2023-04-28 PROCEDURE — 73630 X-RAY EXAM OF FOOT: CPT | Mod: TC,HCNC,PO,RT

## 2023-04-28 PROCEDURE — 3288F PR FALLS RISK ASSESSMENT DOCUMENTED: ICD-10-PCS | Mod: CPTII,S$GLB,, | Performed by: INTERNAL MEDICINE

## 2023-04-28 PROCEDURE — 3078F DIAST BP <80 MM HG: CPT | Mod: CPTII,S$GLB,, | Performed by: INTERNAL MEDICINE

## 2023-04-28 PROCEDURE — 1101F PT FALLS ASSESS-DOCD LE1/YR: CPT | Mod: CPTII,S$GLB,, | Performed by: INTERNAL MEDICINE

## 2023-04-28 PROCEDURE — 73630 X-RAY EXAM OF FOOT: CPT | Mod: 26,HCNC,RT, | Performed by: RADIOLOGY

## 2023-04-28 PROCEDURE — 1126F AMNT PAIN NOTED NONE PRSNT: CPT | Mod: CPTII,S$GLB,, | Performed by: INTERNAL MEDICINE

## 2023-04-28 PROCEDURE — 3078F PR MOST RECENT DIASTOLIC BLOOD PRESSURE < 80 MM HG: ICD-10-PCS | Mod: CPTII,S$GLB,, | Performed by: INTERNAL MEDICINE

## 2023-04-28 PROCEDURE — 99999 PR PBB SHADOW E&M-EST. PATIENT-LVL III: CPT | Mod: PBBFAC,,, | Performed by: INTERNAL MEDICINE

## 2023-04-28 PROCEDURE — 3008F BODY MASS INDEX DOCD: CPT | Mod: CPTII,S$GLB,, | Performed by: INTERNAL MEDICINE

## 2023-04-28 PROCEDURE — 1157F PR ADVANCE CARE PLAN OR EQUIV PRESENT IN MEDICAL RECORD: ICD-10-PCS | Mod: CPTII,S$GLB,, | Performed by: INTERNAL MEDICINE

## 2023-04-28 PROCEDURE — 3008F PR BODY MASS INDEX (BMI) DOCUMENTED: ICD-10-PCS | Mod: CPTII,S$GLB,, | Performed by: INTERNAL MEDICINE

## 2023-04-28 PROCEDURE — 1126F PR PAIN SEVERITY QUANTIFIED, NO PAIN PRESENT: ICD-10-PCS | Mod: CPTII,S$GLB,, | Performed by: INTERNAL MEDICINE

## 2023-04-28 PROCEDURE — 1101F PR PT FALLS ASSESS DOC 0-1 FALLS W/OUT INJ PAST YR: ICD-10-PCS | Mod: CPTII,S$GLB,, | Performed by: INTERNAL MEDICINE

## 2023-04-28 PROCEDURE — 99213 OFFICE O/P EST LOW 20 MIN: CPT | Mod: S$GLB,,, | Performed by: INTERNAL MEDICINE

## 2023-04-28 PROCEDURE — 73630 XR FOOT COMPLETE 3 VIEW RIGHT: ICD-10-PCS | Mod: 26,HCNC,RT, | Performed by: RADIOLOGY

## 2023-04-28 PROCEDURE — 1157F ADVNC CARE PLAN IN RCRD: CPT | Mod: CPTII,S$GLB,, | Performed by: INTERNAL MEDICINE

## 2023-04-28 PROCEDURE — 3288F FALL RISK ASSESSMENT DOCD: CPT | Mod: CPTII,S$GLB,, | Performed by: INTERNAL MEDICINE

## 2023-04-28 PROCEDURE — 3074F PR MOST RECENT SYSTOLIC BLOOD PRESSURE < 130 MM HG: ICD-10-PCS | Mod: CPTII,S$GLB,, | Performed by: INTERNAL MEDICINE

## 2023-04-28 NOTE — PROGRESS NOTES
Subjective     Patient ID: Charu Lopez is a 73 y.o. female.    Chief Complaint: Foot Problem (Bony area)    HPI  Pt here for evaluation of a bony protrusion of her right lateral foot area which she first noticed about 6 months ago. She denies any trauma to the area or any swelling.   Review of Systems   Constitutional:  Negative for activity change, appetite change, chills, diaphoresis, fatigue, fever and unexpected weight change.   HENT:  Negative for postnasal drip, rhinorrhea, sinus pressure/congestion, sneezing, sore throat, trouble swallowing and voice change.    Respiratory:  Negative for cough, shortness of breath and wheezing.    Cardiovascular:  Negative for chest pain, palpitations and leg swelling.   Gastrointestinal:  Negative for abdominal pain, blood in stool, constipation, diarrhea, nausea and vomiting.   Genitourinary:  Negative for dysuria.   Musculoskeletal:  Negative for arthralgias and myalgias.   Integumentary:  Negative for rash and wound.   Allergic/Immunologic: Negative for environmental allergies and food allergies.   Hematological:  Negative for adenopathy. Does not bruise/bleed easily.        Objective     Physical Exam  Constitutional:       General: She is not in acute distress.     Appearance: Normal appearance. She is well-developed. She is not diaphoretic.   HENT:      Head: Normocephalic and atraumatic.      Right Ear: External ear normal.      Left Ear: External ear normal.      Nose: Nose normal.      Mouth/Throat:      Pharynx: No oropharyngeal exudate.   Eyes:      General: No scleral icterus.        Right eye: No discharge.         Left eye: No discharge.      Conjunctiva/sclera: Conjunctivae normal.      Pupils: Pupils are equal, round, and reactive to light.   Neck:      Vascular: No JVD.   Cardiovascular:      Rate and Rhythm: Normal rate and regular rhythm.      Pulses: Normal pulses.      Heart sounds: Normal heart sounds.   Pulmonary:      Effort:  Pulmonary effort is normal. No respiratory distress.      Breath sounds: Normal breath sounds. No wheezing, rhonchi or rales.   Abdominal:      General: Bowel sounds are normal. There is no distension.      Palpations: Abdomen is soft.      Tenderness: There is no abdominal tenderness. There is no guarding or rebound.   Musculoskeletal:      Cervical back: Neck supple.      Right lower leg: No edema.      Left lower leg: No edema.        Feet:    Lymphadenopathy:      Cervical: No cervical adenopathy.   Skin:     General: Skin is warm and dry.      Coloration: Skin is not pale.      Findings: No rash.   Neurological:      General: No focal deficit present.      Mental Status: She is alert and oriented to person, place, and time.      Gait: Gait normal.   Psychiatric:         Behavior: Behavior normal.         Thought Content: Thought content normal.          Assessment and Plan     Problem List Items Addressed This Visit    None  Visit Diagnoses       Right foot pain    -  Primary    Relevant Orders    X-Ray Foot Complete Right        X-ray for further eval

## 2023-05-12 ENCOUNTER — HOSPITAL ENCOUNTER (OUTPATIENT)
Dept: RADIOLOGY | Facility: HOSPITAL | Age: 73
Discharge: HOME | End: 2023-05-12
Attending: INTERNAL MEDICINE
Payer: MEDICARE

## 2023-05-12 DIAGNOSIS — R91.1 SOLITARY PULMONARY NODULE: ICD-10-CM

## 2023-05-12 PROCEDURE — 71250 CT THORAX DX C-: CPT | Mod: 26,HCNC,, | Performed by: RADIOLOGY

## 2023-05-12 PROCEDURE — 71250 CT THORAX DX C-: CPT | Mod: TC,HCNC

## 2023-05-12 PROCEDURE — 71250 CT CHEST WITHOUT CONTRAST: ICD-10-PCS | Mod: 26,HCNC,, | Performed by: RADIOLOGY

## 2023-05-15 ENCOUNTER — TELEPHONE (OUTPATIENT)
Dept: PULMONOLOGY | Facility: CLINIC | Age: 73
End: 2023-05-15
Payer: MEDICARE

## 2023-05-15 DIAGNOSIS — R91.1 SOLITARY PULMONARY NODULE: Primary | ICD-10-CM

## 2023-05-15 NOTE — TELEPHONE ENCOUNTER
CT reviewed RUL 8mm cluster of nodules remains stable for 3-4 months.  Continue surveillance with repeat CT in 6 months (11/2023)    Message left at number provided and [facility] message has been sent.

## 2023-05-16 ENCOUNTER — PATIENT MESSAGE (OUTPATIENT)
Dept: PULMONOLOGY | Facility: CLINIC | Age: 73
End: 2023-05-16
Payer: MEDICARE

## 2023-06-21 ENCOUNTER — PES CALL (OUTPATIENT)
Dept: ADMINISTRATIVE | Facility: CLINIC | Age: 73
End: 2023-06-21
Payer: MEDICARE

## 2023-06-22 ENCOUNTER — OFFICE VISIT (OUTPATIENT)
Dept: INTERNAL MEDICINE | Facility: CLINIC | Age: 73
End: 2023-06-22
Payer: MEDICARE

## 2023-06-22 VITALS
RESPIRATION RATE: 15 BRPM | SYSTOLIC BLOOD PRESSURE: 118 MMHG | BODY MASS INDEX: 26.45 KG/M2 | HEIGHT: 66 IN | HEART RATE: 75 BPM | WEIGHT: 164.56 LBS | DIASTOLIC BLOOD PRESSURE: 60 MMHG

## 2023-06-22 DIAGNOSIS — Z00.00 ENCOUNTER FOR PREVENTIVE HEALTH EXAMINATION: Primary | ICD-10-CM

## 2023-06-22 DIAGNOSIS — J92.0 PLEURAL PLAQUE DUE TO ASBESTOS EXPOSURE: ICD-10-CM

## 2023-06-22 DIAGNOSIS — R91.1 INCIDENTAL PULMONARY NODULE: ICD-10-CM

## 2023-06-22 DIAGNOSIS — K59.09 CHRONIC CONSTIPATION: ICD-10-CM

## 2023-06-22 DIAGNOSIS — E66.3 OVERWEIGHT (BMI 25.0-29.9): ICD-10-CM

## 2023-06-22 DIAGNOSIS — Z12.31 ENCOUNTER FOR SCREENING MAMMOGRAM FOR MALIGNANT NEOPLASM OF BREAST: ICD-10-CM

## 2023-06-22 DIAGNOSIS — M85.859 OSTEOPENIA OF HIP, UNSPECIFIED LATERALITY: ICD-10-CM

## 2023-06-22 DIAGNOSIS — Z78.0 MENOPAUSE: ICD-10-CM

## 2023-06-22 DIAGNOSIS — H25.013 CORTICAL AGE-RELATED CATARACT OF BOTH EYES: ICD-10-CM

## 2023-06-22 PROBLEM — J84.10 CALCIFIED GRANULOMA OF LUNG: Status: RESOLVED | Noted: 2023-06-22 | Resolved: 2023-06-22

## 2023-06-22 PROBLEM — J98.4 CALCIFIED GRANULOMA OF LUNG: Status: RESOLVED | Noted: 2023-06-22 | Resolved: 2023-06-22

## 2023-06-22 PROBLEM — J84.10 CALCIFIED GRANULOMA OF LUNG: Status: ACTIVE | Noted: 2023-06-22

## 2023-06-22 PROBLEM — R09.82 POST-NASAL DRIP: Status: RESOLVED | Noted: 2023-02-16 | Resolved: 2023-06-22

## 2023-06-22 PROBLEM — J98.4 CALCIFIED GRANULOMA OF LUNG: Status: ACTIVE | Noted: 2023-06-22

## 2023-06-22 PROBLEM — R07.89 ATYPICAL CHEST PAIN: Status: RESOLVED | Noted: 2017-04-19 | Resolved: 2023-06-22

## 2023-06-22 PROCEDURE — 1159F PR MEDICATION LIST DOCUMENTED IN MEDICAL RECORD: ICD-10-PCS | Mod: CPTII,S$GLB,, | Performed by: NURSE PRACTITIONER

## 2023-06-22 PROCEDURE — 1160F PR REVIEW ALL MEDS BY PRESCRIBER/CLIN PHARMACIST DOCUMENTED: ICD-10-PCS | Mod: CPTII,S$GLB,, | Performed by: NURSE PRACTITIONER

## 2023-06-22 PROCEDURE — 3288F FALL RISK ASSESSMENT DOCD: CPT | Mod: CPTII,S$GLB,, | Performed by: NURSE PRACTITIONER

## 2023-06-22 PROCEDURE — 3008F BODY MASS INDEX DOCD: CPT | Mod: CPTII,S$GLB,, | Performed by: NURSE PRACTITIONER

## 2023-06-22 PROCEDURE — 3078F PR MOST RECENT DIASTOLIC BLOOD PRESSURE < 80 MM HG: ICD-10-PCS | Mod: CPTII,S$GLB,, | Performed by: NURSE PRACTITIONER

## 2023-06-22 PROCEDURE — 1101F PR PT FALLS ASSESS DOC 0-1 FALLS W/OUT INJ PAST YR: ICD-10-PCS | Mod: CPTII,S$GLB,, | Performed by: NURSE PRACTITIONER

## 2023-06-22 PROCEDURE — 99999 PR PBB SHADOW E&M-EST. PATIENT-LVL V: ICD-10-PCS | Mod: PBBFAC,,, | Performed by: NURSE PRACTITIONER

## 2023-06-22 PROCEDURE — 3008F PR BODY MASS INDEX (BMI) DOCUMENTED: ICD-10-PCS | Mod: CPTII,S$GLB,, | Performed by: NURSE PRACTITIONER

## 2023-06-22 PROCEDURE — 1126F AMNT PAIN NOTED NONE PRSNT: CPT | Mod: CPTII,S$GLB,, | Performed by: NURSE PRACTITIONER

## 2023-06-22 PROCEDURE — 1157F PR ADVANCE CARE PLAN OR EQUIV PRESENT IN MEDICAL RECORD: ICD-10-PCS | Mod: CPTII,S$GLB,, | Performed by: NURSE PRACTITIONER

## 2023-06-22 PROCEDURE — 3074F PR MOST RECENT SYSTOLIC BLOOD PRESSURE < 130 MM HG: ICD-10-PCS | Mod: CPTII,S$GLB,, | Performed by: NURSE PRACTITIONER

## 2023-06-22 PROCEDURE — 3074F SYST BP LT 130 MM HG: CPT | Mod: CPTII,S$GLB,, | Performed by: NURSE PRACTITIONER

## 2023-06-22 PROCEDURE — 1160F RVW MEDS BY RX/DR IN RCRD: CPT | Mod: CPTII,S$GLB,, | Performed by: NURSE PRACTITIONER

## 2023-06-22 PROCEDURE — G0439 PR MEDICARE ANNUAL WELLNESS SUBSEQUENT VISIT: ICD-10-PCS | Mod: S$GLB,,, | Performed by: NURSE PRACTITIONER

## 2023-06-22 PROCEDURE — 3078F DIAST BP <80 MM HG: CPT | Mod: CPTII,S$GLB,, | Performed by: NURSE PRACTITIONER

## 2023-06-22 PROCEDURE — 1157F ADVNC CARE PLAN IN RCRD: CPT | Mod: CPTII,S$GLB,, | Performed by: NURSE PRACTITIONER

## 2023-06-22 PROCEDURE — 3288F PR FALLS RISK ASSESSMENT DOCUMENTED: ICD-10-PCS | Mod: CPTII,S$GLB,, | Performed by: NURSE PRACTITIONER

## 2023-06-22 PROCEDURE — 1170F PR FUNCTIONAL STATUS ASSESSED: ICD-10-PCS | Mod: CPTII,S$GLB,, | Performed by: NURSE PRACTITIONER

## 2023-06-22 PROCEDURE — G0439 PPPS, SUBSEQ VISIT: HCPCS | Mod: S$GLB,,, | Performed by: NURSE PRACTITIONER

## 2023-06-22 PROCEDURE — 1101F PT FALLS ASSESS-DOCD LE1/YR: CPT | Mod: CPTII,S$GLB,, | Performed by: NURSE PRACTITIONER

## 2023-06-22 PROCEDURE — 1126F PR PAIN SEVERITY QUANTIFIED, NO PAIN PRESENT: ICD-10-PCS | Mod: CPTII,S$GLB,, | Performed by: NURSE PRACTITIONER

## 2023-06-22 PROCEDURE — 1170F FXNL STATUS ASSESSED: CPT | Mod: CPTII,S$GLB,, | Performed by: NURSE PRACTITIONER

## 2023-06-22 PROCEDURE — 99999 PR PBB SHADOW E&M-EST. PATIENT-LVL V: CPT | Mod: PBBFAC,,, | Performed by: NURSE PRACTITIONER

## 2023-06-22 PROCEDURE — 1159F MED LIST DOCD IN RCRD: CPT | Mod: CPTII,S$GLB,, | Performed by: NURSE PRACTITIONER

## 2023-06-22 NOTE — PROGRESS NOTES
"Charu Lopez presented for a  Medicare AWV and comprehensive Health Risk Assessment today. The following components were reviewed and updated:    Medical history  Family History  Social history  Allergies and Current Medications  Health Risk Assessment  Health Maintenance  Care Team     ** See Completed Assessments for Annual Wellness Visit within the encounter summary.**       The following assessments were completed:  Living Situation  CAGE  Depression Screening  Timed Get Up and Go  Whisper Test  Cognitive Function Screening  Nutrition Screening  ADL Screening  PAQ Screening      Vitals:    06/22/23 0903 06/22/23 0924   BP: 132/80 118/60   BP Location: Left arm    Patient Position: Sitting    Pulse: 75    Resp: 15    Weight: 74.6 kg (164 lb 9.2 oz)    Height: 5' 6" (1.676 m)      Body mass index is 26.56 kg/m².  Physical Exam  Constitutional:       Comments: Younger in appearance than age   HENT:      Right Ear: There is no impacted cerumen.      Left Ear: There is no impacted cerumen.   Eyes:      General: No scleral icterus.  Cardiovascular:      Rate and Rhythm: Normal rate and regular rhythm.   Pulmonary:      Effort: Pulmonary effort is normal.      Breath sounds: Normal breath sounds.   Abdominal:      Palpations: Abdomen is soft.   Musculoskeletal:         General: No swelling. Normal range of motion.   Skin:     General: Skin is warm and dry.   Neurological:      Mental Status: She is alert and oriented to person, place, and time.   Psychiatric:         Mood and Affect: Mood normal.         Behavior: Behavior normal.         Thought Content: Thought content normal.         Judgment: Judgment normal.          Medication review & Opioid screening perform during rooming section. No prescribed opioid medications noted.  Review for substance use disorder screening performed during rooming section. No substance abuse noted on screening.      Diagnoses and health risks identified today and associated " recommendations/orders:    1. Encounter for preventive health examination  Here for Health Risk Assessment/Annual Wellness Visit.  Health maintenance reviewed and updated. Follow up in one year.      2. Osteopenia of hip, unspecified laterality  - DXA Bone Density Axial Skeleton 1 or more sites; Future    3. Chronic constipation  Stable on regimen & followed by PCP    4. Overweight (BMI 25.0-29.9)  Chronic . Followed by PCP.   Centers for Disease Control and Prevention (CDC)  weight recommendations for current BMI & ideal BMI range discussed with patient.  Recommended  gradual weight loss,  mediterranean diet , structured regular exercise every day.       5. Incidental pulmonary nodule  Stable followed by pulmonary    6. 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 Cortical age-related cataract of both eyes  Stable followed by ophth    8. Menopause  - DXA Bone Density Axial Skeleton 1 or more sites; Future    9. Encounter for screening mammogram for malignant neoplasm of breast  - Mammo Digital Screening Bilat w/ Lorenzo; Future  - Mammo Digital Screening Bilat w/ Lorenzo      Provided Charu Stein with a 5-10 year written screening schedule and personal prevention plan. Recommendations were developed using the USPSTF age appropriate recommendations. Education, counseling, and referrals were provided as needed. After Visit Summary printed and given to patient which includes a list of additional screenings\tests needed. Follow up in about 1 year (around 6/22/2024) for HRA.Prevention of falls- tripped this past year.Eye exam & consider evaluation in physical medicine & rehab- fall risk mitigation.Brain Health Initiative.org resources- Mother had dementia- prevention.No subjective or objective s/s. Looks good!  Ange Ratliff NP I offered to discuss advanced care planning, including how to pick a person who would make decisions for you if you were unable to make them for yourself, called  a health care power of , and what kind of decisions you might make such as use of life sustaining treatments such as ventilators and tube feeding when faced with a life limiting illness recorded on a living will that they will need to know. (How you want to be cared for as you near the end of your natural life)     X Patient is interested in learning more about how to make advanced directives.  I provided them paperwork and offered to discuss this with them.

## 2023-06-22 NOTE — PATIENT INSTRUCTIONS
Counseling and Referral of Other Preventative  (Italic type indicates deductible and co-insurance are waived)    Patient Name: Charu Lopez  Today's Date: 6/22/2023    Health Maintenance         Date Due Completion Date    COVID-19 Vaccine (4 - Pfizer series) Postponed'-read on internet   10/29/2021    Colorectal Cancer Screening Scheduled   2/24/2020        Shingles Vaccine (1 of 2) 07/21/2023 (Originally 1/11/2002) ---    DEXA Scan 08/06/2023 ordered  [facility] 8/6/2021        Influenza Vaccine (Season Ended) 09/01/2023   ---    Mammogram 09/02/2023- at [facility]- external orders   9/2/2022    Lipid Panel 10/20/2023   10/20/2022    TETANUS VACCINE    Ct chest & Dr  ( Dr Boyer)       Prevention of fall- handouts- check with eye MD on exam & consider evaluation inphysical medicine & rehab- prevention of falls    Brain Health Initiative.org- 3 institutions- resources-evidence based    [facility] sem- online classes memory training    [employer]- leisure classes   01/05/2028    Due in Nov 2023-order in epic   1/5/2018                  No orders of the defined types were placed in this encounter.    The following information is provided to all patients.  This information is to help you find resources for any of the problems found today that may be affecting your health:                Living healthy guide: www.[facility].[place].gov      Understanding Diabetes: www.diabetes.org      Eating healthy: www.cdc.gov/healthyweight      CDC home safety checklist: www.cdc.gov/steadi/patient.html      Agency on Aging: www.goea.[place].gov      Alcoholics anonymous (AA): www.aa.org      Physical Activity: www.fred.nih.gov/vp9xfzc      Tobacco use: www.quitwithusla.org

## 2023-06-28 ENCOUNTER — CLINICAL SUPPORT (OUTPATIENT)
Dept: ENDOSCOPY | Facility: HOSPITAL | Age: 73
End: 2023-06-28
Attending: COLON & RECTAL SURGERY
Payer: MEDICARE

## 2023-06-28 ENCOUNTER — TELEPHONE (OUTPATIENT)
Dept: ENDOSCOPY | Facility: HOSPITAL | Age: 73
End: 2023-06-28

## 2023-06-28 ENCOUNTER — PATIENT MESSAGE (OUTPATIENT)
Dept: ENDOSCOPY | Facility: HOSPITAL | Age: 73
End: 2023-06-28

## 2023-06-28 VITALS — HEIGHT: 66 IN | BODY MASS INDEX: 26.36 KG/M2 | WEIGHT: 164 LBS

## 2023-06-28 DIAGNOSIS — Z86.010 HISTORY OF COLON POLYPS: ICD-10-CM

## 2023-06-28 DIAGNOSIS — Z12.11 COLON CANCER SCREENING: Primary | ICD-10-CM

## 2023-06-28 RX ORDER — POLYETHYLENE GLYCOL 3350, SODIUM SULFATE ANHYDROUS, SODIUM BICARBONATE, SODIUM CHLORIDE, POTASSIUM CHLORIDE 236; 22.74; 6.74; 5.86; 2.97 G/4L; G/4L; G/4L; G/4L; G/4L
4 POWDER, FOR SOLUTION ORAL ONCE
Qty: 4000 ML | Refills: 0 | Status: SHIPPED | OUTPATIENT
Start: 2023-06-28 | End: 2023-06-28

## 2023-06-28 NOTE — TELEPHONE ENCOUNTER
Spoke to patient to schedule procedure(s) Colonoscopy       Physician to perform procedure(s) Dr. GERI Hanson  Date of Procedure (s) 9/5/23  Arrival Time 8:50 AM  Time of Procedure(s) 9:50 AM   Location of Procedure(s) [place]  Type of Rx Prep sent to patient: PEG  Instructions provided to patient via Postal Mail and [facility]    Patient was informed on the following information and verbalized understanding. Screening questionnaire reviewed with patient and complete. If procedure requires anesthesia, a responsible adult needs to be present to accompany the patient home, patient cannot drive after receiving anesthesia. Appointment details are tentative, especially check-in time. Patient will receive a prep-op call 4 days prior to confirm check-in time for procedure. If applicable the patient should contact their pharmacy to verify Rx for procedure prep is ready for pick-up. Patient was advised to call the scheduling department at [phone] if pharmacy states no Rx is available. Patient was advised to call the endoscopy scheduling department if any questions or concerns arise.      [facility] Scheduling Department

## 2023-07-19 ENCOUNTER — PATIENT MESSAGE (OUTPATIENT)
Dept: INTERNAL MEDICINE | Facility: CLINIC | Age: 73
End: 2023-07-19
Payer: MEDICARE

## 2023-08-08 ENCOUNTER — OFFICE VISIT (OUTPATIENT)
Dept: OPTOMETRY | Facility: CLINIC | Age: 73
End: 2023-08-08
Payer: MEDICARE

## 2023-08-08 DIAGNOSIS — H04.123 DRY EYES, BILATERAL: Primary | ICD-10-CM

## 2023-08-08 PROCEDURE — 3288F FALL RISK ASSESSMENT DOCD: CPT | Mod: HCNC,CPTII,S$GLB, | Performed by: OPTOMETRIST

## 2023-08-08 PROCEDURE — 1160F PR REVIEW ALL MEDS BY PRESCRIBER/CLIN PHARMACIST DOCUMENTED: ICD-10-PCS | Mod: HCNC,CPTII,S$GLB, | Performed by: OPTOMETRIST

## 2023-08-08 PROCEDURE — 1157F ADVNC CARE PLAN IN RCRD: CPT | Mod: HCNC,CPTII,S$GLB, | Performed by: OPTOMETRIST

## 2023-08-08 PROCEDURE — 1157F PR ADVANCE CARE PLAN OR EQUIV PRESENT IN MEDICAL RECORD: ICD-10-PCS | Mod: HCNC,CPTII,S$GLB, | Performed by: OPTOMETRIST

## 2023-08-08 PROCEDURE — 1101F PT FALLS ASSESS-DOCD LE1/YR: CPT | Mod: HCNC,CPTII,S$GLB, | Performed by: OPTOMETRIST

## 2023-08-08 PROCEDURE — 92014 COMPRE OPH EXAM EST PT 1/>: CPT | Mod: HCNC,S$GLB,, | Performed by: OPTOMETRIST

## 2023-08-08 PROCEDURE — 99999 PR PBB SHADOW E&M-EST. PATIENT-LVL II: CPT | Mod: PBBFAC,HCNC,, | Performed by: OPTOMETRIST

## 2023-08-08 PROCEDURE — 1126F AMNT PAIN NOTED NONE PRSNT: CPT | Mod: HCNC,CPTII,S$GLB, | Performed by: OPTOMETRIST

## 2023-08-08 PROCEDURE — 1126F PR PAIN SEVERITY QUANTIFIED, NO PAIN PRESENT: ICD-10-PCS | Mod: HCNC,CPTII,S$GLB, | Performed by: OPTOMETRIST

## 2023-08-08 PROCEDURE — 3288F PR FALLS RISK ASSESSMENT DOCUMENTED: ICD-10-PCS | Mod: HCNC,CPTII,S$GLB, | Performed by: OPTOMETRIST

## 2023-08-08 PROCEDURE — 99999 PR PBB SHADOW E&M-EST. PATIENT-LVL II: ICD-10-PCS | Mod: PBBFAC,HCNC,, | Performed by: OPTOMETRIST

## 2023-08-08 PROCEDURE — 1159F PR MEDICATION LIST DOCUMENTED IN MEDICAL RECORD: ICD-10-PCS | Mod: HCNC,CPTII,S$GLB, | Performed by: OPTOMETRIST

## 2023-08-08 PROCEDURE — 1101F PR PT FALLS ASSESS DOC 0-1 FALLS W/OUT INJ PAST YR: ICD-10-PCS | Mod: HCNC,CPTII,S$GLB, | Performed by: OPTOMETRIST

## 2023-08-08 PROCEDURE — 1159F MED LIST DOCD IN RCRD: CPT | Mod: HCNC,CPTII,S$GLB, | Performed by: OPTOMETRIST

## 2023-08-08 PROCEDURE — 92014 PR EYE EXAM, EST PATIENT,COMPREHESV: ICD-10-PCS | Mod: HCNC,S$GLB,, | Performed by: OPTOMETRIST

## 2023-08-08 PROCEDURE — 1160F RVW MEDS BY RX/DR IN RCRD: CPT | Mod: HCNC,CPTII,S$GLB, | Performed by: OPTOMETRIST

## 2023-08-08 NOTE — PROGRESS NOTES
"HPI    74 Y/o female is here for urgent for PVD pt state about week her OS has   been hurting her everyday and she is also seeing flashing in OS pt say's   she has PVD  Pt denies pain and discomfort   Flashes OS     Eye med: Soothe Xp OU QD    Last edited by Abelino Michaud MA on 8/8/2023 10:48 AM.            Assessment /Plan     For exam results, see Encounter Report.    Dry eyes, bilateral      (See prev notes from myself/Dr Corona)    Pt has dry eye complaints--again discussed need for ATs QID+  Pt ALSO has complaint that her left eye is "larger" than the right eye, and sometimes it is worse than others.  She also complains of ache behind that eye.  Since this is a consistent complaint will get MD consult to ro retrobulbar issues causing transient proptosis    PLAN:    Ophth consult                   "

## 2023-08-09 ENCOUNTER — PATIENT MESSAGE (OUTPATIENT)
Dept: INTERNAL MEDICINE | Facility: CLINIC | Age: 73
End: 2023-08-09
Payer: MEDICARE

## 2023-08-10 ENCOUNTER — TELEPHONE (OUTPATIENT)
Dept: OPHTHALMOLOGY | Facility: CLINIC | Age: 73
End: 2023-08-10
Payer: MEDICARE

## 2023-08-10 NOTE — TELEPHONE ENCOUNTER
L/M to schedule an appointment w/(consult to ro retrobulbar issues causing transient proptosis) bethany

## 2023-08-22 ENCOUNTER — TELEPHONE (OUTPATIENT)
Dept: OPHTHALMOLOGY | Facility: CLINIC | Age: 73
End: 2023-08-22
Payer: MEDICARE

## 2023-08-22 ENCOUNTER — TELEPHONE (OUTPATIENT)
Dept: INTERNAL MEDICINE | Facility: CLINIC | Age: 73
End: 2023-08-22
Payer: MEDICARE

## 2023-08-22 DIAGNOSIS — E78.5 DYSLIPIDEMIA: ICD-10-CM

## 2023-08-22 DIAGNOSIS — R53.83 FATIGUE, UNSPECIFIED TYPE: ICD-10-CM

## 2023-08-22 DIAGNOSIS — R79.9 ABNORMAL FINDING OF BLOOD CHEMISTRY, UNSPECIFIED: ICD-10-CM

## 2023-08-22 DIAGNOSIS — M85.851 OSTEOPENIA OF RIGHT HIP: ICD-10-CM

## 2023-08-22 DIAGNOSIS — Z00.00 ANNUAL PHYSICAL EXAM: Primary | ICD-10-CM

## 2023-08-23 ENCOUNTER — TELEPHONE (OUTPATIENT)
Dept: INTERNAL MEDICINE | Facility: CLINIC | Age: 73
End: 2023-08-23
Payer: MEDICARE

## 2023-08-23 NOTE — TELEPHONE ENCOUNTER
"----- Message from Nayely Mendoza sent at 8/23/2023 12:18 PM CDT -----  Contact: [phone]    Caller is requesting to schedule their Lab appointment prior to annual appointment.  Order is not listed in EPIC.  Please enter order and contact patient to schedule.      Preferred Date and Time of Labs: 12/14 an am appt     Date of Annual Physical Appointment: 12/21    Where would they like the lab performed? [place] location     Would the patient rather a call back or a response via My [facility]? [facility]     Best Call Back Number: [phone]    Additional Information: n/a     "Do not send messages requesting lab orders prior to Physical appt on these providers: Fredi Davis, Chica Dalton,  Fawad De La Cruz and Chris."       "

## 2023-08-24 ENCOUNTER — TELEPHONE (OUTPATIENT)
Dept: OPHTHALMOLOGY | Facility: CLINIC | Age: 73
End: 2023-08-24
Payer: MEDICARE

## 2023-08-24 NOTE — TELEPHONE ENCOUNTER
----- Message from Charisse Peck sent at 8/23/2023 12:24 PM CDT -----  Consult/Advisory    Name Of Caller:Sarita       Contact Preference:[phone]    Nature of call: Ptn returning a missed call

## 2023-08-30 ENCOUNTER — PATIENT MESSAGE (OUTPATIENT)
Dept: OPHTHALMOLOGY | Facility: CLINIC | Age: 73
End: 2023-08-30
Payer: MEDICARE

## 2023-09-05 ENCOUNTER — ANESTHESIA EVENT (OUTPATIENT)
Dept: ENDOSCOPY | Facility: HOSPITAL | Age: 73
End: 2023-09-05
Payer: MEDICARE

## 2023-09-05 ENCOUNTER — ANESTHESIA (OUTPATIENT)
Dept: ENDOSCOPY | Facility: HOSPITAL | Age: 73
End: 2023-09-05
Payer: MEDICARE

## 2023-09-05 ENCOUNTER — HOSPITAL ENCOUNTER (OUTPATIENT)
Facility: HOSPITAL | Age: 73
Discharge: HOME | End: 2023-09-05
Attending: STUDENT IN AN ORGANIZED HEALTH CARE EDUCATION/TRAINING PROGRAM | Admitting: STUDENT IN AN ORGANIZED HEALTH CARE EDUCATION/TRAINING PROGRAM
Payer: MEDICARE

## 2023-09-05 VITALS
HEIGHT: 66 IN | WEIGHT: 158 LBS | TEMPERATURE: 98 F | BODY MASS INDEX: 25.39 KG/M2 | DIASTOLIC BLOOD PRESSURE: 90 MMHG | HEART RATE: 72 BPM | OXYGEN SATURATION: 100 % | SYSTOLIC BLOOD PRESSURE: 127 MMHG | RESPIRATION RATE: 16 BRPM

## 2023-09-05 DIAGNOSIS — Z12.11 SCREENING FOR COLON CANCER: ICD-10-CM

## 2023-09-05 PROCEDURE — G0105 COLORECTAL SCRN; HI RISK IND: HCPCS | Mod: HCNC | Performed by: STUDENT IN AN ORGANIZED HEALTH CARE EDUCATION/TRAINING PROGRAM

## 2023-09-05 PROCEDURE — 37000009 HC ANESTHESIA EA ADD 15 MINS: Mod: HCNC | Performed by: STUDENT IN AN ORGANIZED HEALTH CARE EDUCATION/TRAINING PROGRAM

## 2023-09-05 PROCEDURE — E9220 PRA ENDO ANESTHESIA: ICD-10-PCS | Mod: HCNC,,, | Performed by: NURSE ANESTHETIST, CERTIFIED REGISTERED

## 2023-09-05 PROCEDURE — G0105 COLORECTAL SCRN; HI RISK IND: ICD-10-PCS | Mod: HCNC,,, | Performed by: STUDENT IN AN ORGANIZED HEALTH CARE EDUCATION/TRAINING PROGRAM

## 2023-09-05 PROCEDURE — 37000008 HC ANESTHESIA 1ST 15 MINUTES: Mod: HCNC | Performed by: STUDENT IN AN ORGANIZED HEALTH CARE EDUCATION/TRAINING PROGRAM

## 2023-09-05 PROCEDURE — E9220 PRA ENDO ANESTHESIA: HCPCS | Mod: HCNC,,, | Performed by: NURSE ANESTHETIST, CERTIFIED REGISTERED

## 2023-09-05 PROCEDURE — 63600175 PHARM REV CODE 636 W HCPCS: Mod: HCNC | Performed by: NURSE ANESTHETIST, CERTIFIED REGISTERED

## 2023-09-05 PROCEDURE — 25000003 PHARM REV CODE 250: Mod: HCNC | Performed by: NURSE ANESTHETIST, CERTIFIED REGISTERED

## 2023-09-05 PROCEDURE — G0105 COLORECTAL SCRN; HI RISK IND: HCPCS | Mod: HCNC,,, | Performed by: STUDENT IN AN ORGANIZED HEALTH CARE EDUCATION/TRAINING PROGRAM

## 2023-09-05 RX ORDER — PROPOFOL 10 MG/ML
VIAL (ML) INTRAVENOUS
Status: DISCONTINUED | OUTPATIENT
Start: 2023-09-05 | End: 2023-09-05

## 2023-09-05 RX ORDER — MAGNESIUM 250 MG
TABLET ORAL ONCE
COMMUNITY

## 2023-09-05 RX ORDER — PROPOFOL 10 MG/ML
VIAL (ML) INTRAVENOUS CONTINUOUS PRN
Status: DISCONTINUED | OUTPATIENT
Start: 2023-09-05 | End: 2023-09-05

## 2023-09-05 RX ORDER — LIDOCAINE HYDROCHLORIDE 20 MG/ML
INJECTION INTRAVENOUS
Status: DISCONTINUED | OUTPATIENT
Start: 2023-09-05 | End: 2023-09-05

## 2023-09-05 RX ADMIN — LIDOCAINE HYDROCHLORIDE 50 MG: 20 INJECTION INTRAVENOUS at 09:09

## 2023-09-05 RX ADMIN — SODIUM CHLORIDE: 0.9 INJECTION, SOLUTION INTRAVENOUS at 09:09

## 2023-09-05 RX ADMIN — PROPOFOL 175 MCG/KG/MIN: 10 INJECTION, EMULSION INTRAVENOUS at 09:09

## 2023-09-05 RX ADMIN — PROPOFOL 100 MG: 10 INJECTION, EMULSION INTRAVENOUS at 09:09

## 2023-09-05 NOTE — TRANSFER OF CARE
"Anesthesia Transfer of Care Note    Patient: Charu Lopez    Procedure(s) Performed: Procedure(s) (LRB):  COLONOSCOPY (N/A)    Patient location: PACU    Anesthesia Type: general    Transport from OR: Transported from OR on room air with adequate spontaneous ventilation    Post pain: adequate analgesia    Post assessment: no apparent anesthetic complications    Post vital signs: stable    Level of consciousness: awake    Nausea/Vomiting: no nausea/vomiting    Complications: none    Transfer of care protocol was followed      Last vitals:   Visit Vitals  /62 (BP Location: Left arm, Patient Position: Lying)   Pulse 75   Temp 36.4 °C (97.5 °F) (Temporal)   Resp 16   Ht 5' 6" (1.676 m)   Wt 71.7 kg (158 lb)   SpO2 97%   Breastfeeding No   BMI 25.50 kg/m²     "

## 2023-09-05 NOTE — PROVATION PATIENT INSTRUCTIONS
Discharge Summary/Instructions after an Endoscopic Procedure  Patient Name: Charu Lopez  Patient MRN: [id number]  Patient   YOB: 1950 Tuesday, September 5, 2023  Uriel Hanson MD  Dear patient,  As a result of recent federal legislation (The Federal Cures Act), you may   receive lab or pathology results from your procedure in your [facility]   account before your physician is able to contact you. Your physician or   their representative will relay the results to you with their   recommendations at their soonest availability.  Thank you,  RESTRICTIONS:  During your procedure today, you received medications for sedation.  These   medications may affect your judgment, balance and coordination.  Therefore,   for 24 hours, you have the following restrictions:   - DO NOT drive a car, operate machinery, make legal/financial decisions,   sign important papers or drink alcohol.    ACTIVITY:  Today: no heavy lifting, straining or running due to procedural   sedation/anesthesia.  The following day: return to full activity including work.  DIET:  Eat and drink normally unless instructed otherwise.     TREATMENT FOR COMMON SIDE EFFECTS:  - Mild abdominal pain, nausea, belching, bloating or excessive gas:  rest,   eat lightly and use a heating pad.  - Sore Throat: treat with throat lozenges and/or gargle with warm salt   water.  - Because air was used during the procedure, expelling large amounts of air   from your rectum or belching is normal.  - If a bowel prep was taken, you may not have a bowel movement for 1-3 days.    This is normal.  SYMPTOMS TO WATCH FOR AND REPORT TO YOUR PHYSICIAN:  1. Abdominal pain or bloating, other than gas cramps.  2. Chest pain.  3. Back pain.  4. Signs of infection such as: chills or fever occurring within 24 hours   after the procedure.  5. Rectal bleeding, which would show as bright red, maroon, or black stools.   (A tablespoon of blood from the rectum is not serious,  especially if   hemorrhoids are present.)  6. Vomiting.  7. Weakness or dizziness.  GO DIRECTLY TO THE NEAREST EMERGENCY ROOM IF YOU HAVE ANY OF THE FOLLOWING:      Difficulty breathing              Chills and/or fever over 101 F   Persistent vomiting and/or vomiting blood   Severe abdominal pain   Severe chest pain   Black, tarry stools   Bleeding- more than one tablespoon   Any other symptom or condition that you feel may need urgent attention  Your doctor recommends these additional instructions:  If any biopsies were taken, your doctors clinic will contact you in 1 to 2   weeks with any results.  - Discharge patient to home.   - Resume previous diet.   - Continue present medications.   - Repeat colonoscopy in 5 years for surveillance.   - Return to referring physician as previously scheduled.  For questions, problems or results please call your physician - Uriel Hanson MD at Work:  [phone].  [facility] EMERGENCY ROOM PHONE NUMBER: [phone]  IF A COMPLICATION OR EMERGENCY SITUATION ARISES AND YOU ARE UNABLE TO REACH   YOUR PHYSICIAN - GO DIRECTLY TO THE EMERGENCY ROOM.  MD Uriel Marley MD  9/5/2023 10:14:15 AM  This report has been verified and signed electronically.  Dear patient,  As a result of recent federal legislation (The Federal Cures Act), you may   receive lab or pathology results from your procedure in your [facility]   account before your physician is able to contact you. Your physician or   their representative will relay the results to you with their   recommendations at their soonest availability.  Thank you,  PROVATION

## 2023-09-05 NOTE — ANESTHESIA POSTPROCEDURE EVALUATION
Anesthesia Post Evaluation    Patient: Charu Lopez    Procedure(s) Performed: Procedure(s) (LRB):  COLONOSCOPY (N/A)    Final Anesthesia Type: general      Patient location during evaluation: PACU  Patient participation: Yes- Able to Participate  Level of consciousness: awake and alert  Post-procedure vital signs: reviewed and stable  Pain management: adequate  Airway patency: patent    PONV status at discharge: No PONV  Anesthetic complications: no      Cardiovascular status: blood pressure returned to baseline  Respiratory status: spontaneous ventilation and room air  Hydration status: euvolemic  Follow-up not needed.          Vitals Value Taken Time   /90 09/05/23 1045   Temp 36.4 °C (97.5 °F) 09/05/23 1015   Pulse 72 09/05/23 1045   Resp 16 09/05/23 1045   SpO2 100 % 09/05/23 1045         Event Time   Out of Recovery 10:55:06         Pain/Gely Score: Gely Score: 9 (9/5/2023 10:17 AM)

## 2023-09-05 NOTE — H&P
[facility]  Colon and Rectal Surgery    [address]  [place], [place]  Tel: [phone]  Fax: [phone]  [email or web address]   MD Marko Adames MD Brian Kann, MD W. Forrest Johnston, MD Matthew Giglia, MD Jennifer Paruch, MD William Kethman, MD Danielle Kay, MD     Patient name: Sarita Lopez   YOB: 1950   MRN: [id number]  Date of procedure: 09/05/2023    Procedure: Colonoscopy  Indications: Previous adenomatous polyp and Family history of colorectal cancer    Last colonoscopy: 2020 (Complete)    The patient was informed of the availability of a certified  without charge. A certified  was not necessary for this visit.    Sedation plan: MAC  ASA: ASA 2 - Patient with mild systemic disease with no functional limitations    Review of Systems  See above    Past Medical History:   Diagnosis Date    Allergy birth    benadryl    Cataract     Colon polyps     Endometrioma of ovary     Fever blister     Osteoarthritis-knee, hand 1/21/2020    Osteopenia     Skin disease august 29, 2019     Past Surgical History:   Procedure Laterality Date    BREAST BIOPSY  91    wnl     COLONOSCOPY N/A 2/24/2020    Procedure: COLONOSCOPY;  Surgeon: STEPHANIE Webb MD;  Location: [facility] ([address] [place] [address]);  Service: Endoscopy;  Laterality: N/A;    ENDOMETRIAL BIOPSY  12/12/2012    laparatomy 1994      OOPHORECTOMY  1995    Left ov removal due to endometrioma    SKIN BIOPSY  1990s     Family History   Problem Relation Age of Onset    Cancer Father     Colon cancer Father 58    Diabetes Father     Hypertension Father     Stroke Mother     Dementia Mother     Alzheimer's disease Mother     Alzheimer's disease Sister     Polycystic kidney disease Daughter     No Known Problems Son     Breast cancer Neg Hx     Ovarian cancer Neg Hx     Amblyopia Neg Hx     Strabismus Neg Hx     Thyroid disease Neg Hx     Retinal detachment Neg Hx      "Macular degeneration Neg Hx     Glaucoma Neg Hx     Cataracts Neg Hx     Blindness Neg Hx     Melanoma Neg Hx     Psoriasis Neg Hx      Social History     Tobacco Use    Smoking status: Never     Passive exposure: Never    Smokeless tobacco: Never   Substance Use Topics    Alcohol use: No    Drug use: Never     Review of patient's allergies indicates:   Allergen Reactions    Benadryl [diphenhydramine hcl]     Tetracyclines      Stomach upset         Prior to Admission medications    Medication Sig Start Date End Date Taking? Authorizing Provider   magnesium 250 mg Tab Take by mouth once.   Yes Provider, Historical   multivitamin (THERAGRAN) per tablet Take 1 tablet by mouth once daily.   Yes Provider, Historical   psyllium 0.52 gram capsule Take 0.52 g by mouth once daily.   Yes Provider, Historical       Physical Examination  BP (!) 141/74 (BP Location: Left arm, Patient Position: Lying)   Pulse 92   Temp 97.3 °F (36.3 °C)   Resp 16   Ht 5' 6" (1.676 m)   Wt 71.7 kg (158 lb)   SpO2 99%   Breastfeeding No   BMI 25.50 kg/m²      Constitutional: well developed, no cough, no dyspnea, alert, and no acute distress    Head: Normocephalic, no lesions, without obvious abnormality  Eye: Normal external eye, conjunctiva, and lids, PERRL  Cardiovascular: regular rate and regular rhythm  Respiratory: normal air entry  Gastrointestinal: soft, non-tender, without masses or organomegaly  Neurologic: alert, oriented, normal speech, no focal findings or movement disorder noted  Psychiatric: appropriate, normal mood    Plan of Care    It was a pleasure meeting Ms. Lopez today - we will plan to perform a colonoscopy with monitored anesthesia care. The details of the procedure, the possible need for biopsy or polypectomy and the potential risks including bleeding, perforation, missed polyps were discussed in detail and they consented to undergo the procedure.      Uriel Hanson MD, FACS, FASCRS  Department of Colon & " Rectal Surgery  [facility]

## 2023-09-05 NOTE — ANESTHESIA PREPROCEDURE EVALUATION
09/05/2023  Charu Lopez is a 73 y.o., female.  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     I have reviewed the Nursing Notes. I have reviewed the NPO Status.   I have reviewed the Medications.     Review of Systems  Anesthesia Hx:  No problems with previous Anesthesia    Social:  No Alcohol Use, Non-Smoker    Musculoskeletal:   Arthritis         Physical Exam  General: Well nourished, Cooperative, Alert and Oriented    Airway:  Mallampati: II / II  Mouth Opening: Normal  TM Distance: Normal  Tongue: Normal  Neck ROM: Normal ROM    Dental:  Intact    Chest/Lungs:  Clear to auscultation, Normal Respiratory Rate    Heart:  Rate: Normal  Rhythm: Regular Rhythm        Anesthesia Plan  Type of Anesthesia, risks & benefits discussed:    Anesthesia Type: Gen Natural Airway  Intra-op Monitoring Plan: Standard ASA Monitors  Post Op Pain Control Plan: multimodal analgesia  Induction:  IV  Informed Consent: Informed consent signed with the Patient and all parties understand the risks and agree with anesthesia plan.  All questions answered.   ASA Score: 1  Day of Surgery Review of History & Physical: H&P Update referred to the surgeon/provider.    Ready For  Surgery From Anesthesia Perspective.     .

## 2023-09-14 ENCOUNTER — APPOINTMENT (OUTPATIENT)
Dept: RADIOLOGY | Facility: CLINIC | Age: 73
End: 2023-09-14
Attending: NURSE PRACTITIONER
Payer: MEDICARE

## 2023-09-14 DIAGNOSIS — Z78.0 MENOPAUSE: ICD-10-CM

## 2023-09-14 DIAGNOSIS — M85.859 OSTEOPENIA OF HIP, UNSPECIFIED LATERALITY: ICD-10-CM

## 2023-09-14 PROCEDURE — 77080 DXA BONE DENSITY AXIAL: CPT | Mod: TC,HCNC,PO

## 2023-09-14 PROCEDURE — 77080 DXA BONE DENSITY AXIAL SKELETON 1 OR MORE SITES: ICD-10-PCS | Mod: 26,HCNC,, | Performed by: INTERNAL MEDICINE

## 2023-09-14 PROCEDURE — 77080 DXA BONE DENSITY AXIAL: CPT | Mod: 26,HCNC,, | Performed by: INTERNAL MEDICINE

## 2023-09-20 ENCOUNTER — PATIENT MESSAGE (OUTPATIENT)
Dept: INTERNAL MEDICINE | Facility: CLINIC | Age: 73
End: 2023-09-20
Payer: MEDICARE

## 2023-09-25 PROBLEM — Z00.00 ENCOUNTER FOR PREVENTIVE HEALTH EXAMINATION: Chronic | Status: RESOLVED | Noted: 2023-06-22 | Resolved: 2023-09-25

## 2023-09-29 ENCOUNTER — PATIENT MESSAGE (OUTPATIENT)
Dept: INTERNAL MEDICINE | Facility: CLINIC | Age: 73
End: 2023-09-29
Payer: MEDICARE

## 2023-10-04 ENCOUNTER — DOCUMENTATION ONLY (OUTPATIENT)
Dept: INTERNAL MEDICINE | Facility: CLINIC | Age: 73
End: 2023-10-04
Payer: MEDICARE

## 2023-10-04 ENCOUNTER — PATIENT MESSAGE (OUTPATIENT)
Dept: INTERNAL MEDICINE | Facility: CLINIC | Age: 73
End: 2023-10-04
Payer: MEDICARE

## 2023-10-04 ENCOUNTER — OFFICE VISIT (OUTPATIENT)
Dept: OPHTHALMOLOGY | Facility: CLINIC | Age: 73
End: 2023-10-04
Payer: MEDICARE

## 2023-10-04 DIAGNOSIS — H25.13 NUCLEAR SCLEROTIC CATARACT OF BOTH EYES: ICD-10-CM

## 2023-10-04 DIAGNOSIS — H57.02 PHYSIOLOGIC ANISOCORIA: Primary | ICD-10-CM

## 2023-10-04 PROCEDURE — 1160F RVW MEDS BY RX/DR IN RCRD: CPT | Mod: HCNC,CPTII,S$GLB, | Performed by: OPHTHALMOLOGY

## 2023-10-04 PROCEDURE — 3288F FALL RISK ASSESSMENT DOCD: CPT | Mod: HCNC,CPTII,S$GLB, | Performed by: OPHTHALMOLOGY

## 2023-10-04 PROCEDURE — 1159F MED LIST DOCD IN RCRD: CPT | Mod: HCNC,CPTII,S$GLB, | Performed by: OPHTHALMOLOGY

## 2023-10-04 PROCEDURE — 1125F PR PAIN SEVERITY QUANTIFIED, PAIN PRESENT: ICD-10-PCS | Mod: HCNC,CPTII,S$GLB, | Performed by: OPHTHALMOLOGY

## 2023-10-04 PROCEDURE — 1101F PR PT FALLS ASSESS DOC 0-1 FALLS W/OUT INJ PAST YR: ICD-10-PCS | Mod: HCNC,CPTII,S$GLB, | Performed by: OPHTHALMOLOGY

## 2023-10-04 PROCEDURE — 99205 OFFICE O/P NEW HI 60 MIN: CPT | Mod: HCNC,S$GLB,, | Performed by: OPHTHALMOLOGY

## 2023-10-04 PROCEDURE — 1157F ADVNC CARE PLAN IN RCRD: CPT | Mod: HCNC,CPTII,S$GLB, | Performed by: OPHTHALMOLOGY

## 2023-10-04 PROCEDURE — 1160F PR REVIEW ALL MEDS BY PRESCRIBER/CLIN PHARMACIST DOCUMENTED: ICD-10-PCS | Mod: HCNC,CPTII,S$GLB, | Performed by: OPHTHALMOLOGY

## 2023-10-04 PROCEDURE — 99205 PR OFFICE/OUTPT VISIT, NEW, LEVL V, 60-74 MIN: ICD-10-PCS | Mod: HCNC,S$GLB,, | Performed by: OPHTHALMOLOGY

## 2023-10-04 PROCEDURE — 99999 PR PBB SHADOW E&M-EST. PATIENT-LVL II: CPT | Mod: PBBFAC,HCNC,, | Performed by: OPHTHALMOLOGY

## 2023-10-04 PROCEDURE — 99999 PR PBB SHADOW E&M-EST. PATIENT-LVL II: ICD-10-PCS | Mod: PBBFAC,HCNC,, | Performed by: OPHTHALMOLOGY

## 2023-10-04 PROCEDURE — 1159F PR MEDICATION LIST DOCUMENTED IN MEDICAL RECORD: ICD-10-PCS | Mod: HCNC,CPTII,S$GLB, | Performed by: OPHTHALMOLOGY

## 2023-10-04 PROCEDURE — 1157F PR ADVANCE CARE PLAN OR EQUIV PRESENT IN MEDICAL RECORD: ICD-10-PCS | Mod: HCNC,CPTII,S$GLB, | Performed by: OPHTHALMOLOGY

## 2023-10-04 PROCEDURE — 1125F AMNT PAIN NOTED PAIN PRSNT: CPT | Mod: HCNC,CPTII,S$GLB, | Performed by: OPHTHALMOLOGY

## 2023-10-04 PROCEDURE — 1101F PT FALLS ASSESS-DOCD LE1/YR: CPT | Mod: HCNC,CPTII,S$GLB, | Performed by: OPHTHALMOLOGY

## 2023-10-04 PROCEDURE — 3288F PR FALLS RISK ASSESSMENT DOCUMENTED: ICD-10-PCS | Mod: HCNC,CPTII,S$GLB, | Performed by: OPHTHALMOLOGY

## 2023-10-04 NOTE — LETTER
[facility] - [address] [place] [address]  [address]  [facility] [address]  Phone: [phone]  Fax: [phone]   October 4, 2023    Sahil Weaver, OD  [address]  [facility] [address]    Patient: Charu Lopez   MR Number: [id number]   YOB: 1950   Date of Visit: 10/4/2023       Dear Dr. Weaver:    Thank you for referring Charu Lopez to me for evaluation. Here is my assessment and plan of care:    Assessment/Plan    For exam results, see Encounter Report.    Physiologic anisocoria    Nuclear sclerotic cataract of both eyes      Although Ms. Lopez described her eyes not being the same size, Geremias exophthalmometry was symmetrical. I reassure her that I found no indication of a neurological process. She will return to Dr. Weaver for continued eye care and to me as needed.          Below you will find my full exam findings. If you have questions, please do not hesitate to call me. I look forward to following Ms. Charu Lopez along with you.    Sincerely,          Tomy Healy MD       CC  No Recipients             Base Eye Exam       Visual Acuity (Snellen - Linear)         Right Left    Dist cc 20/30 20/40 +1    Dist ph cc 20/25 -1 NI      Correction: Glasses              Tonometry (Tonopen, 9:31 AM)         Right Left    Pressure 18 18              Pupils         Dark Light Shape APD    Right 4 3 Round None    Left 5 4 Round None              Visual Fields (Counting fingers)         Right Left     Full Full              Extraocular Movement         Right Left     Full, Ortho Full, Ortho              Neuro/Psych       Oriented x3: Yes    Mood/Affect: Normal              Dilation       Both eyes: 2.5% Phenylephrine, 1% Mydriacyl @ 9:31 AM                  Slit Lamp and Fundus Exam       External Exam         Right Left    External Normal Normal              Exophthalmometry (Base: 97 mm)         Right Left    Geremias 20 mm 20 mm               Slit Lamp Exam         Right Left    Lids/Lashes Normal Normal    Conjunctiva/Sclera White and quiet White and quiet    Cornea Clear Clear    Anterior Chamber Deep and quiet Deep and quiet    Iris Round and reactive Round and reactive    Lens 2+ Nuclear sclerosis 2+ Nuclear sclerosis    Vitreous Normal Normal              Fundus Exam         Right Left    Disc Normal Normal    C/D Ratio 0.3 0.3    Macula Normal Normal    Vessels Normal Normal    Periphery Normal Normal

## 2023-10-04 NOTE — PROGRESS NOTES
HPI    Referred by Dr.Brian Weaver  Hx of left pupil larger than right past 5 years.    Patient states OS achy which seem to come and go, but happening more   frequently. Tender to touch OS. Transient Proptosis of OS.  Mild ache this morning.--2 on pain scale.    I have personally interviewed the patient, reviewed the history and   examined the patient and agree with the technician's exam.   Last edited by Tomy Healy MD on 10/4/2023  8:54 AM.            Assessment /Plan     For exam results, see Encounter Report.    Physiologic anisocoria    Nuclear sclerotic cataract of both eyes      Although Ms. Lopez described her eyes not being the same size, Geremias exophthalmometry was symmetrical. I reassure her that I found no indication of a neurological process. She will return to Dr. Weaver for continued eye care and to me as needed.

## 2023-10-04 NOTE — PROGRESS NOTES
Pt's mammogram reviewed on care everywhere- results negative & recommended repeat in 1 yr- done at [facility]. Pt notified. Copy of results mailed.

## 2023-10-04 NOTE — PATIENT INSTRUCTIONS
Although Ms. Lopez described her eyes not being the same size, Geremias exophthalmometry was symmetrical. I reassure her that I found no indication of a neurological process. She will return to Dr. Weaver for continued eye care and to me as needed.

## 2023-10-17 ENCOUNTER — OFFICE VISIT (OUTPATIENT)
Dept: URGENT CARE | Facility: CLINIC | Age: 73
End: 2023-10-17
Payer: MEDICARE

## 2023-10-17 VITALS
HEIGHT: 66 IN | OXYGEN SATURATION: 95 % | RESPIRATION RATE: 18 BRPM | HEART RATE: 100 BPM | TEMPERATURE: 99 F | BODY MASS INDEX: 25.39 KG/M2 | WEIGHT: 158 LBS | DIASTOLIC BLOOD PRESSURE: 76 MMHG | SYSTOLIC BLOOD PRESSURE: 116 MMHG

## 2023-10-17 DIAGNOSIS — R05.9 COUGH, UNSPECIFIED TYPE: Primary | ICD-10-CM

## 2023-10-17 DIAGNOSIS — U07.1 COVID-19 VIRUS DETECTED: ICD-10-CM

## 2023-10-17 DIAGNOSIS — U07.1 COVID: ICD-10-CM

## 2023-10-17 LAB
CTP QC/QA: YES
SARS-COV-2 AG RESP QL IA.RAPID: POSITIVE

## 2023-10-17 PROCEDURE — 87811 SARS-COV-2 COVID19 W/OPTIC: CPT | Mod: QW,S$GLB,,

## 2023-10-17 PROCEDURE — 87811 SARS CORONAVIRUS 2 ANTIGEN POCT, MANUAL READ: ICD-10-PCS | Mod: QW,S$GLB,,

## 2023-10-17 PROCEDURE — 99213 PR OFFICE/OUTPT VISIT, EST, LEVL III, 20-29 MIN: ICD-10-PCS | Mod: S$GLB,,,

## 2023-10-17 PROCEDURE — 99213 OFFICE O/P EST LOW 20 MIN: CPT | Mod: S$GLB,,,

## 2023-10-17 NOTE — PROGRESS NOTES
"Subjective:      Patient ID: Charu Lopez is a 73 y.o. female.    Vitals:  height is 5' 6" (1.676 m) and weight is 71.7 kg (158 lb). Her oral temperature is 99.3 °F (37.4 °C). Her blood pressure is 116/76 and her pulse is 100. Her respiration is 18 and oxygen saturation is 95%.     Chief Complaint: Cough    This is a 73 y.o. female who presents today with a chief complaint of  cough. Symptoms started on Sunday. Patient has been experiencing sleeplessness, fatigue, body aches and fever. Patient had a temp of 100.0 at 1 am this morning. Patient also has a pain that goes from her lower left leg to her ankle.      Cough  This is a new problem. The current episode started in the past 7 days. The problem has been gradually worsening. The problem occurs constantly. The cough is Non-productive. Associated symptoms include a fever, headaches, myalgias, nasal congestion, postnasal drip and rhinorrhea. Pertinent negatives include no chest pain, ear pain, eye redness, sore throat, shortness of breath or wheezing. Treatments tried: Tylenol for fever. The treatment provided mild relief.       Constitution: Positive for fatigue and fever. Negative for activity change and appetite change.   HENT:  Positive for postnasal drip. Negative for ear pain, ear discharge, sinus pain, sinus pressure and sore throat.    Cardiovascular:  Negative for chest pain and sob on exertion.   Eyes:  Negative for eye discharge and eye redness.   Respiratory:  Positive for cough. Negative for shortness of breath and wheezing.    Gastrointestinal:  Negative for abdominal pain, nausea, vomiting, constipation and diarrhea.   Genitourinary:  Negative for dysuria, frequency, urgency and urine decreased.   Musculoskeletal:  Positive for muscle ache. Negative for pain.   Neurological:  Positive for headaches.      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 Webb  Prep: golytely (pt requested)  Route instructions sent: [facility] mailed-Kpvt    ENDOMETRIAL BIOPSY  12/12/2012    laparatomy 1994      OOPHORECTOMY  1995    Left ov removal due to endometrioma    SKIN BIOPSY  1990s       Family History   Problem Relation Age of Onset    Cancer Father     Colon cancer Father 58    Diabetes Father     Hypertension Father     Stroke Mother     Dementia Mother     Alzheimer's disease Mother     Alzheimer's disease Sister     Polycystic kidney disease Daughter     No Known Problems Son     Breast cancer Neg Hx     Ovarian cancer Neg Hx     Amblyopia Neg Hx     Strabismus Neg Hx     Thyroid disease Neg Hx     Retinal detachment Neg Hx     Macular degeneration Neg Hx     Glaucoma Neg Hx     Cataracts Neg Hx     Blindness Neg Hx     Melanoma Neg Hx     Psoriasis Neg Hx        Social History     Socioeconomic History    Marital status: Single    Number of children: 2   Occupational History    Occupation:    Tobacco Use    Smoking status: Never     Passive exposure: Never    Smokeless tobacco: Never   Substance and Sexual Activity    Alcohol use: No    Drug use: Never    Sexual activity: Not Currently     Comment: , menarche 14,  age    Other Topics Concern    Are you pregnant or think you may be? No    Breast-feeding No     Social Determinants of Health     Financial Resource Strain: Low Risk  (6/22/2023)    Overall Financial Resource Strain  (CARDIA)     Difficulty of Paying Living Expenses: Not hard at all   Food Insecurity: No Food Insecurity (6/22/2023)    Hunger Vital Sign     Worried About Running Out of Food in the Last Year: Never true     Ran Out of Food in the Last Year: Never true   Transportation Needs: No Transportation Needs (6/22/2023)    PRAPARE - Transportation     Lack of Transportation (Medical): No     Lack of Transportation (Non-Medical): No   Stress: No Stress Concern Present (6/22/2023)    [employer] [place] [employer] - Occupational Stress Questionnaire     Feeling of Stress : Only a little   Social Connections: Unknown (6/22/2023)    Social Connection and Isolation Panel [NHANES]     Marital Status:    Housing Stability: Unknown (6/22/2023)    Housing Stability Vital Sign     Unable to Pay for Housing in the Last Year: No     Unstable Housing in the Last Year: No       Current Outpatient Medications   Medication Sig Dispense Refill    magnesium 250 mg Tab Take by mouth once.      multivitamin (THERAGRAN) per tablet Take 1 tablet by mouth once daily.      psyllium 0.52 gram capsule Take 0.52 g by mouth once daily.      nirmatrelvir-ritonavir 300 mg (150 mg x 2)-100 mg copackaged tablets (EUA) Take 3 tablets by mouth 2 (two) times daily for 5 days. Each dose contains 2 nirmatrelvir (pink tablets) and 1 ritonavir (white tablet). Take all 3 tablets together 30 tablet 0     No current facility-administered medications for this visit.       Review of patient's allergies indicates:   Allergen Reactions    Benadryl [diphenhydramine hcl]     Tetracyclines      Stomach upset        Objective:     Physical Exam   Constitutional: She is oriented to person, place, and time. She appears well-developed. She is cooperative.  Non-toxic appearance. She does not appear ill. No distress.   HENT:   Head: Normocephalic and atraumatic.   Ears:   Right Ear: Hearing, tympanic membrane, external ear and ear canal normal.   Left Ear:  Hearing, tympanic membrane, external ear and ear canal normal.   Nose: Nose normal. No mucosal edema, rhinorrhea or nasal deformity. No epistaxis. Right sinus exhibits no maxillary sinus tenderness and no frontal sinus tenderness. Left sinus exhibits no maxillary sinus tenderness and no frontal sinus tenderness.   Mouth/Throat: Uvula is midline, oropharynx is clear and moist and mucous membranes are normal. No trismus in the jaw. Normal dentition. No uvula swelling. No oropharyngeal exudate, posterior oropharyngeal edema or posterior oropharyngeal erythema. No tonsillar exudate.   Eyes: Conjunctivae and lids are normal. No scleral icterus.   Neck: Trachea normal and phonation normal. Neck supple. No edema present. No erythema present. No neck rigidity present.   Cardiovascular: Normal rate, regular rhythm, normal heart sounds and normal pulses.   Pulmonary/Chest: Effort normal and breath sounds normal. No accessory muscle usage. No apnea, no tachypnea and no bradypnea. No respiratory distress. She has no decreased breath sounds. She has no wheezes. She has no rhonchi.   Abdominal: Normal appearance.   Musculoskeletal: Normal range of motion.         General: No deformity. Normal range of motion.      Right lower leg: Normal. She exhibits no swelling. No edema.      Left lower leg: Normal. She exhibits no swelling. No edema.   Neurological: She is alert and oriented to person, place, and time. She exhibits normal muscle tone. Coordination normal.   Skin: Skin is warm, dry, intact, not diaphoretic and not pale.   Psychiatric: Her speech is normal and behavior is normal. Judgment and thought content normal.   Nursing note and vitals reviewed.    Results for orders placed or performed in visit on 10/17/23   SARS Coronavirus 2 Antigen, POCT Manual Read   Result Value Ref Range    SARS Coronavirus 2 Antigen Positive (A) Negative     Acceptable Yes       Assessment:     1. Cough, unspecified type    2. COVID         Plan:     I have reviewed the patient chart and pertinent past imaging/labs.   Cough, unspecified type  -     SARS Coronavirus 2 Antigen, POCT Manual Read    COVID  -     nirmatrelvir-ritonavir 300 mg (150 mg x 2)-100 mg copackaged tablets (EUA); Take 3 tablets by mouth 2 (two) times daily for 5 days. Each dose contains 2 nirmatrelvir (pink tablets) and 1 ritonavir (white tablet). Take all 3 tablets together  Dispense: 30 tablet; Refill: 0      Patient Instructions   Take Paxlovid two times daily for five days.  If fever or pain, take tylenol or ibuprofen.  If shortness of breath develops, or severe symptoms, go to emergency room.   Please drink plenty of fluids.  Please get plenty of rest.  Please return here or go to the Emergency Department for any concerns or worsening of condition.  If you were prescribed antibiotics, please take them to completion.  If you were given wait & see antibiotics, please wait 5-7 days before taking them, and only take them if your symptoms have worsened or not improved.  If you do begin taking the antibiotics, please take them to completion.  If you were prescribed a narcotic medication, do not drive or operate heavy equipment or machinery while taking these medications.  If you were given a steroid shot in the clinic and have also been given a prescription for a steroid such as Prednisone or a Medrol Dose Pack, please begin taking them tomorrow.  If you do not have Hypertension or any history of palpitations, it is ok to take over the counter Sudafed or Mucinex D or Allegra-D or Claritin-D or Zyrtec-D.  If you do take one of the above, it is ok to combine that with plain over the counter Mucinex or Allegra or Claritin or Zyrtec.  If for example you are taking Zyrtec -D, you can combine that with Mucinex, but not Mucinex-D.  If you are taking Mucinex-D, you can combine that with plain Allegra or Claritin or Zyrtec.   If you do have Hypertension or palpitations, it is safe to  take Coricidin HBP for relief of sinus symptoms.  If not allergic, please take over the counter Tylenol (Acetaminophen) and/or Motrin (Ibuprofen) as directed for control of pain and/or fever.  Please follow up with your primary care doctor or specialist as needed.    If you  smoke, please stop smoking.

## 2023-10-17 NOTE — PATIENT INSTRUCTIONS
Take Paxlovid two times daily for five days.  If fever or pain, take tylenol or ibuprofen.  If shortness of breath develops, or severe symptoms, go to emergency room.   Please drink plenty of fluids.  Please get plenty of rest.  Please return here or go to the Emergency Department for any concerns or worsening of condition.  If you were prescribed antibiotics, please take them to completion.  If you were given wait & see antibiotics, please wait 5-7 days before taking them, and only take them if your symptoms have worsened or not improved.  If you do begin taking the antibiotics, please take them to completion.  If you were prescribed a narcotic medication, do not drive or operate heavy equipment or machinery while taking these medications.  If you were given a steroid shot in the clinic and have also been given a prescription for a steroid such as Prednisone or a Medrol Dose Pack, please begin taking them tomorrow.  If you do not have Hypertension or any history of palpitations, it is ok to take over the counter Sudafed or Mucinex D or Allegra-D or Claritin-D or Zyrtec-D.  If you do take one of the above, it is ok to combine that with plain over the counter Mucinex or Allegra or Claritin or Zyrtec.  If for example you are taking Zyrtec -D, you can combine that with Mucinex, but not Mucinex-D.  If you are taking Mucinex-D, you can combine that with plain Allegra or Claritin or Zyrtec.   If you do have Hypertension or palpitations, it is safe to take Coricidin HBP for relief of sinus symptoms.  If not allergic, please take over the counter Tylenol (Acetaminophen) and/or Motrin (Ibuprofen) as directed for control of pain and/or fever.  Please follow up with your primary care doctor or specialist as needed.    If you  smoke, please stop smoking.

## 2023-10-17 NOTE — LETTER
[address] [facility]  [place] [address]  Phone: [phone]  Fax: [phone]          Return to Work/School    Patient: Charu Lopez  YOB: 1950   Date: 10/17/2023     To Whom It May Concern:     Charu Lopez was in contact with/seen in my office on 10/17/2023. COVID-19 is present in our communities across the [facility].    Charu Lopez has met the criteria for COVID-19 testing based upon symptoms, travel, and/or potential exposure. The test has been completed and is positive. During this time the employee is not able to work and should be quarantined per the Centers for Disease Control timelines for 5 days, until October 20th, 2023.      If you have any questions or concerns, or if I can be of further assistance, please do not hesitate to contact me.     Sincerely,    Abhinav Huffman PA-C        Final Anesthesia Post-op Assessment    Patient: Sampson Mcguire  Procedure(s) Performed: POSTERIOR LUMBAR FUSION TWO LEVEL T12-L1, L1-L2/SOLERA  Anesthesia type: General    Vitals Value Taken Time   Temp 36.8 °C (98.3 °F) 5/6/2019  5:49 PM   Pulse 66 5/6/2019  5:53 PM   Resp 14 5/6/2019  5:49 PM   /99 5/6/2019  5:55 PM   SpO2 98 % 5/6/2019  5:53 PM   Vitals shown include unvalidated device data.    Last 24 I/O:     Intake/Output Summary (Last 24 hours) at 5/6/2019 1755  Last data filed at 5/6/2019 1642  Gross per 24 hour   Intake --   Output 480 ml   Net -480 ml       PATIENT LOCATION: PACU Phase 1  POST-OP VITAL SIGNS: stable  LEVEL OF CONSCIOUSNESS: awake and oriented  RESPIRATORY STATUS: nasal cannula and spontaneous ventilation  CARDIOVASCULAR: blood pressure returned to baseline  HYDRATION: euvolemic    PAIN MANAGEMENT: adequately controlled  NAUSEA: None  AIRWAY PATENCY: patent  POST-OP ASSESSMENT: no complications, patient tolerated procedure well with no complications and sufficiently recovered from acute administration of anesthesia effects and able to participate in evaluation  COMPLICATIONS: none

## 2023-10-23 ENCOUNTER — PATIENT MESSAGE (OUTPATIENT)
Dept: INTERNAL MEDICINE | Facility: CLINIC | Age: 73
End: 2023-10-23
Payer: MEDICARE

## 2023-10-23 ENCOUNTER — TELEPHONE (OUTPATIENT)
Dept: INTERNAL MEDICINE | Facility: CLINIC | Age: 73
End: 2023-10-23
Payer: MEDICARE

## 2023-10-23 NOTE — TELEPHONE ENCOUNTER
----- Message from Keshia Petersen sent at 10/23/2023 10:53 AM CDT -----  Contact: Pt [phone]  Pt called and tested positive for covid on 10/17/23 and she did an at home test today and it is negative. She stated that her work needs a note stating that she can return to work. She is wanting to go back on 10/24/23.    Please call and advise

## 2023-12-14 ENCOUNTER — LAB VISIT (OUTPATIENT)
Dept: LAB | Facility: HOSPITAL | Age: 73
End: 2023-12-14
Attending: INTERNAL MEDICINE
Payer: MEDICARE

## 2023-12-14 DIAGNOSIS — R53.83 FATIGUE, UNSPECIFIED TYPE: ICD-10-CM

## 2023-12-14 DIAGNOSIS — R79.9 ABNORMAL FINDING OF BLOOD CHEMISTRY, UNSPECIFIED: ICD-10-CM

## 2023-12-14 DIAGNOSIS — Z00.00 ANNUAL PHYSICAL EXAM: ICD-10-CM

## 2023-12-14 DIAGNOSIS — M85.851 OSTEOPENIA OF RIGHT HIP: ICD-10-CM

## 2023-12-14 DIAGNOSIS — E78.5 DYSLIPIDEMIA: ICD-10-CM

## 2023-12-14 LAB
ALBUMIN SERPL BCP-MCNC: 3.7 G/DL (ref 3.5–5.2)
ALP SERPL-CCNC: 83 U/L (ref 55–135)
ALT SERPL W/O P-5'-P-CCNC: 16 U/L (ref 10–44)
ANION GAP SERPL CALC-SCNC: 10 MMOL/L (ref 8–16)
AST SERPL-CCNC: 20 U/L (ref 10–40)
BASOPHILS # BLD AUTO: 0.05 K/UL (ref 0–0.2)
BASOPHILS NFR BLD: 1.1 % (ref 0–1.9)
BILIRUB SERPL-MCNC: 0.7 MG/DL (ref 0.1–1)
BUN SERPL-MCNC: 14 MG/DL (ref 8–23)
CALCIUM SERPL-MCNC: 9.6 MG/DL (ref 8.7–10.5)
CHLORIDE SERPL-SCNC: 105 MMOL/L (ref 95–110)
CHOLEST SERPL-MCNC: 114 MG/DL (ref 120–199)
CHOLEST/HDLC SERPL: 1.8 {RATIO} (ref 2–5)
CO2 SERPL-SCNC: 24 MMOL/L (ref 23–29)
CREAT SERPL-MCNC: 0.9 MG/DL (ref 0.5–1.4)
DIFFERENTIAL METHOD: ABNORMAL
EOSINOPHIL # BLD AUTO: 0.3 K/UL (ref 0–0.5)
EOSINOPHIL NFR BLD: 5.3 % (ref 0–8)
ERYTHROCYTE [DISTWIDTH] IN BLOOD BY AUTOMATED COUNT: 14.1 % (ref 11.5–14.5)
EST. GFR  (NO RACE VARIABLE): >60 ML/MIN/1.73 M^2
ESTIMATED AVG GLUCOSE: 105 MG/DL (ref 68–131)
GLUCOSE SERPL-MCNC: 92 MG/DL (ref 70–110)
HBA1C MFR BLD: 5.3 % (ref 4–5.6)
HCT VFR BLD AUTO: 45.3 % (ref 37–48.5)
HDLC SERPL-MCNC: 63 MG/DL (ref 40–75)
HDLC SERPL: 55.3 % (ref 20–50)
HGB BLD-MCNC: 14.2 G/DL (ref 12–16)
IMM GRANULOCYTES # BLD AUTO: 0 K/UL (ref 0–0.04)
IMM GRANULOCYTES NFR BLD AUTO: 0 % (ref 0–0.5)
LDLC SERPL CALC-MCNC: 45.8 MG/DL (ref 63–159)
LYMPHOCYTES # BLD AUTO: 2.2 K/UL (ref 1–4.8)
LYMPHOCYTES NFR BLD: 46.2 % (ref 18–48)
MCH RBC QN AUTO: 30.8 PG (ref 27–31)
MCHC RBC AUTO-ENTMCNC: 31.3 G/DL (ref 32–36)
MCV RBC AUTO: 98 FL (ref 82–98)
MONOCYTES # BLD AUTO: 0.6 K/UL (ref 0.3–1)
MONOCYTES NFR BLD: 12.8 % (ref 4–15)
NEUTROPHILS # BLD AUTO: 1.6 K/UL (ref 1.8–7.7)
NEUTROPHILS NFR BLD: 34.6 % (ref 38–73)
NONHDLC SERPL-MCNC: 51 MG/DL
NRBC BLD-RTO: 0 /100 WBC
PLATELET # BLD AUTO: 227 K/UL (ref 150–450)
PMV BLD AUTO: 12.3 FL (ref 9.2–12.9)
POTASSIUM SERPL-SCNC: 3.9 MMOL/L (ref 3.5–5.1)
PROT SERPL-MCNC: 7.1 G/DL (ref 6–8.4)
RBC # BLD AUTO: 4.61 M/UL (ref 4–5.4)
SODIUM SERPL-SCNC: 139 MMOL/L (ref 136–145)
TRIGL SERPL-MCNC: 26 MG/DL (ref 30–150)
TSH SERPL DL<=0.005 MIU/L-ACNC: 1.5 UIU/ML (ref 0.4–4)
WBC # BLD AUTO: 4.7 K/UL (ref 3.9–12.7)

## 2023-12-14 PROCEDURE — 85025 COMPLETE CBC W/AUTO DIFF WBC: CPT | Mod: HCNC | Performed by: INTERNAL MEDICINE

## 2023-12-14 PROCEDURE — 84443 ASSAY THYROID STIM HORMONE: CPT | Mod: HCNC | Performed by: INTERNAL MEDICINE

## 2023-12-14 PROCEDURE — 83036 HEMOGLOBIN GLYCOSYLATED A1C: CPT | Mod: HCNC | Performed by: INTERNAL MEDICINE

## 2023-12-14 PROCEDURE — 36415 COLL VENOUS BLD VENIPUNCTURE: CPT | Mod: HCNC,PO | Performed by: INTERNAL MEDICINE

## 2023-12-14 PROCEDURE — 80053 COMPREHEN METABOLIC PANEL: CPT | Mod: HCNC | Performed by: INTERNAL MEDICINE

## 2023-12-14 PROCEDURE — 80061 LIPID PANEL: CPT | Mod: HCNC | Performed by: INTERNAL MEDICINE

## 2023-12-21 ENCOUNTER — OFFICE VISIT (OUTPATIENT)
Dept: INTERNAL MEDICINE | Facility: CLINIC | Age: 73
End: 2023-12-21
Payer: MEDICARE

## 2023-12-21 VITALS
TEMPERATURE: 96 F | OXYGEN SATURATION: 98 % | BODY MASS INDEX: 25.05 KG/M2 | HEART RATE: 72 BPM | SYSTOLIC BLOOD PRESSURE: 118 MMHG | DIASTOLIC BLOOD PRESSURE: 70 MMHG | HEIGHT: 66 IN | WEIGHT: 155.88 LBS

## 2023-12-21 DIAGNOSIS — Z00.00 ANNUAL PHYSICAL EXAM: Primary | ICD-10-CM

## 2023-12-21 PROCEDURE — 3044F HG A1C LEVEL LT 7.0%: CPT | Mod: HCNC,CPTII,S$GLB, | Performed by: INTERNAL MEDICINE

## 2023-12-21 PROCEDURE — 1126F PR PAIN SEVERITY QUANTIFIED, NO PAIN PRESENT: ICD-10-PCS | Mod: HCNC,CPTII,S$GLB, | Performed by: INTERNAL MEDICINE

## 2023-12-21 PROCEDURE — 99397 PER PM REEVAL EST PAT 65+ YR: CPT | Mod: HCNC,S$GLB,, | Performed by: INTERNAL MEDICINE

## 2023-12-21 PROCEDURE — 1126F AMNT PAIN NOTED NONE PRSNT: CPT | Mod: HCNC,CPTII,S$GLB, | Performed by: INTERNAL MEDICINE

## 2023-12-21 PROCEDURE — 3074F PR MOST RECENT SYSTOLIC BLOOD PRESSURE < 130 MM HG: ICD-10-PCS | Mod: HCNC,CPTII,S$GLB, | Performed by: INTERNAL MEDICINE

## 2023-12-21 PROCEDURE — 1101F PT FALLS ASSESS-DOCD LE1/YR: CPT | Mod: HCNC,CPTII,S$GLB, | Performed by: INTERNAL MEDICINE

## 2023-12-21 PROCEDURE — 1159F PR MEDICATION LIST DOCUMENTED IN MEDICAL RECORD: ICD-10-PCS | Mod: HCNC,CPTII,S$GLB, | Performed by: INTERNAL MEDICINE

## 2023-12-21 PROCEDURE — 3044F PR MOST RECENT HEMOGLOBIN A1C LEVEL <7.0%: ICD-10-PCS | Mod: HCNC,CPTII,S$GLB, | Performed by: INTERNAL MEDICINE

## 2023-12-21 PROCEDURE — 3074F SYST BP LT 130 MM HG: CPT | Mod: HCNC,CPTII,S$GLB, | Performed by: INTERNAL MEDICINE

## 2023-12-21 PROCEDURE — 3008F PR BODY MASS INDEX (BMI) DOCUMENTED: ICD-10-PCS | Mod: HCNC,CPTII,S$GLB, | Performed by: INTERNAL MEDICINE

## 2023-12-21 PROCEDURE — 99999 PR PBB SHADOW E&M-EST. PATIENT-LVL III: CPT | Mod: PBBFAC,HCNC,, | Performed by: INTERNAL MEDICINE

## 2023-12-21 PROCEDURE — 3078F DIAST BP <80 MM HG: CPT | Mod: HCNC,CPTII,S$GLB, | Performed by: INTERNAL MEDICINE

## 2023-12-21 PROCEDURE — 1159F MED LIST DOCD IN RCRD: CPT | Mod: HCNC,CPTII,S$GLB, | Performed by: INTERNAL MEDICINE

## 2023-12-21 PROCEDURE — 1157F PR ADVANCE CARE PLAN OR EQUIV PRESENT IN MEDICAL RECORD: ICD-10-PCS | Mod: HCNC,CPTII,S$GLB, | Performed by: INTERNAL MEDICINE

## 2023-12-21 PROCEDURE — 99999 PR PBB SHADOW E&M-EST. PATIENT-LVL III: ICD-10-PCS | Mod: PBBFAC,HCNC,, | Performed by: INTERNAL MEDICINE

## 2023-12-21 PROCEDURE — 3288F PR FALLS RISK ASSESSMENT DOCUMENTED: ICD-10-PCS | Mod: HCNC,CPTII,S$GLB, | Performed by: INTERNAL MEDICINE

## 2023-12-21 PROCEDURE — 1160F RVW MEDS BY RX/DR IN RCRD: CPT | Mod: HCNC,CPTII,S$GLB, | Performed by: INTERNAL MEDICINE

## 2023-12-21 PROCEDURE — 99397 PR PREVENTIVE VISIT,EST,65 & OVER: ICD-10-PCS | Mod: HCNC,S$GLB,, | Performed by: INTERNAL MEDICINE

## 2023-12-21 PROCEDURE — 1101F PR PT FALLS ASSESS DOC 0-1 FALLS W/OUT INJ PAST YR: ICD-10-PCS | Mod: HCNC,CPTII,S$GLB, | Performed by: INTERNAL MEDICINE

## 2023-12-21 PROCEDURE — 3008F BODY MASS INDEX DOCD: CPT | Mod: HCNC,CPTII,S$GLB, | Performed by: INTERNAL MEDICINE

## 2023-12-21 PROCEDURE — 3078F PR MOST RECENT DIASTOLIC BLOOD PRESSURE < 80 MM HG: ICD-10-PCS | Mod: HCNC,CPTII,S$GLB, | Performed by: INTERNAL MEDICINE

## 2023-12-21 PROCEDURE — 1157F ADVNC CARE PLAN IN RCRD: CPT | Mod: HCNC,CPTII,S$GLB, | Performed by: INTERNAL MEDICINE

## 2023-12-21 PROCEDURE — 1160F PR REVIEW ALL MEDS BY PRESCRIBER/CLIN PHARMACIST DOCUMENTED: ICD-10-PCS | Mod: HCNC,CPTII,S$GLB, | Performed by: INTERNAL MEDICINE

## 2023-12-21 PROCEDURE — 3288F FALL RISK ASSESSMENT DOCD: CPT | Mod: HCNC,CPTII,S$GLB, | Performed by: INTERNAL MEDICINE

## 2023-12-21 NOTE — PROGRESS NOTES
Subjective     Patient ID: Charu Lopez is a 73 y.o. female.    Chief Complaint: Annual Exam    HPI  73 y.o. Female here for annual exam.       Vaccines: Influenza (declined); Tetanus (2018); Pneumovax (declined); Shingrix (will consider)  Eye exam: declined  Mammogram: done at 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  No date: Fever blister  1/21/2020: Osteoarthritis-knee, hand  No date: Osteopenia  august 29, 2019: Skin disease  Past Surgical History:  89: BREAST BIOPSY      Comment:  wnl   2/24/2020: COLONOSCOPY; N/A      Comment:  Procedure: COLONOSCOPY;  Surgeon: STEPHANIE Webb MD;                 Location: [address];  Service: Endoscopy;                 Laterality: N/A;  12/12/2012: ENDOMETRIAL BIOPSY  No date: laparatomy 1994 1995: OOPHORECTOMY      Comment:  Left ov removal due to endometrioma  1990s: SKIN BIOPSY  Social History    Socioeconomic History      Marital status: Single      Spouse name: Not on file      Number of children: 2      Years of education: Not on file      Highest education level: Not on file    Occupational History      Occupation:     Tobacco Use      Smoking status: Never Smoker      Smokeless tobacco: Never Used    Substance and Sexual Activity      Alcohol use: No      Drug use: Never      Sexual activity: Not Currently        Comment: , menarche 14,  age     Other Topics      Concerns:        Are you pregnant or think you may be?: No        Breast-feeding: No    Social History Narrative      Not on file     Social Determinants of Health  Financial Resource Strain:       Difficulty of Paying Living Expenses:   Food Insecurity:       Worried About Running Out of Food in the Last Year:       Ran Out of Food in the Last Year:    Transportation Needs:       Lack of Transportation (Medical):       Lack of Transportation (Non-Medical):   Physical Activity:       Days of Exercise per Week:       Minutes of Exercise per Session:   Stress:       Feeling of Stress :   Social Connections:       Frequency of Communication with Friends and Family:       Frequency of Social Gatherings with Friends and Family:       Attends Gnosticist Services:       Active Member of Clubs or Organizations:       Attends Club or Organization Meetings:       Marital Status:   Review of patient's allergies indicates:   -- Benadryl [diphenhydramine hcl]    -- Tetracyclines     --  Stomach upset  Review of Systems   Constitutional:  Negative for activity change, appetite change, chills, fatigue, fever and unexpected weight change.   HENT:  Negative for nasal congestion, mouth sores, postnasal drip, rhinorrhea, sinus pressure/congestion, sneezing, sore throat, trouble swallowing and voice change.    Eyes:  Negative for pain, discharge and visual disturbance.   Respiratory:  Negative for cough, shortness of breath and wheezing.    Cardiovascular:  Negative for chest pain, palpitations and leg swelling.   Gastrointestinal:  Negative for abdominal pain, blood in stool, constipation, diarrhea, nausea and vomiting.   Endocrine: Negative for cold intolerance and heat intolerance.   Genitourinary:  Negative for difficulty urinating, dysuria, frequency, hematuria and urgency.   Musculoskeletal:  Negative for arthralgias and myalgias.   Integumentary:  Negative for rash and wound.   Allergic/Immunologic: Negative for environmental allergies and food allergies.   Neurological:  Negative for dizziness, tremors, seizures, syncope, weakness, light-headedness and headaches.   Psychiatric/Behavioral:  Negative for confusion and sleep disturbance. The patient is not nervous/anxious.           Objective     Physical Exam  Vitals and nursing note reviewed.   Constitutional:       General:  She is not in acute distress.     Appearance: Normal appearance. She is well-developed. She is not diaphoretic.   HENT:      Head: Normocephalic and atraumatic.      Right Ear: External ear normal.      Left Ear: External ear normal.      Nose: Nose normal.      Mouth/Throat:      Pharynx: No oropharyngeal exudate.   Eyes:      General: No scleral icterus.        Right eye: No discharge.         Left eye: No discharge.      Conjunctiva/sclera: Conjunctivae normal.      Pupils: Pupils are equal, round, and reactive to light.   Neck:      Thyroid: No thyromegaly.      Vascular: No JVD.   Cardiovascular:      Rate and Rhythm: Normal rate and regular rhythm.      Pulses: Normal pulses.      Heart sounds: Normal heart sounds. No murmur heard.  Pulmonary:      Effort: Pulmonary effort is normal. No respiratory distress.      Breath sounds: Normal breath sounds. No wheezing, rhonchi or rales.   Chest:      Chest wall: No tenderness.   Abdominal:      General: Bowel sounds are normal. There is no distension.      Palpations: Abdomen is soft.      Tenderness: There is no abdominal tenderness. There is no guarding or rebound.   Musculoskeletal:      Cervical back: Neck supple.      Right lower leg: No edema.      Left lower leg: No edema.   Lymphadenopathy:      Cervical: No cervical adenopathy.   Skin:     General: Skin is warm and dry.      Coloration: Skin is not pale.      Findings: No rash.   Neurological:      General: No focal deficit present.      Mental Status: She is alert and oriented to person, place, and time.      Gait: Gait normal.   Psychiatric:         Behavior: Behavior normal.         Thought Content: Thought content normal.         Judgment: Judgment normal.            Assessment and Plan     1. Annual physical exam        Blood work reviewed with pt

## 2024-04-22 ENCOUNTER — PATIENT MESSAGE (OUTPATIENT)
Dept: INTERNAL MEDICINE | Facility: CLINIC | Age: 74
End: 2024-04-22
Payer: MEDICARE

## 2024-05-02 ENCOUNTER — HOSPITAL ENCOUNTER (OUTPATIENT)
Dept: RADIOLOGY | Facility: HOSPITAL | Age: 74
Discharge: HOME | End: 2024-05-02
Attending: NURSE PRACTITIONER
Payer: MEDICARE

## 2024-05-02 ENCOUNTER — OFFICE VISIT (OUTPATIENT)
Dept: INTERNAL MEDICINE | Facility: CLINIC | Age: 74
End: 2024-05-02
Payer: MEDICARE

## 2024-05-02 VITALS
SYSTOLIC BLOOD PRESSURE: 126 MMHG | DIASTOLIC BLOOD PRESSURE: 72 MMHG | HEART RATE: 68 BPM | RESPIRATION RATE: 16 BRPM | OXYGEN SATURATION: 97 % | HEIGHT: 66 IN | WEIGHT: 159.38 LBS | TEMPERATURE: 98 F | BODY MASS INDEX: 25.61 KG/M2

## 2024-05-02 DIAGNOSIS — M25.551 PAIN OF RIGHT HIP: Primary | ICD-10-CM

## 2024-05-02 DIAGNOSIS — M25.551 PAIN OF RIGHT HIP: ICD-10-CM

## 2024-05-02 PROCEDURE — 73502 X-RAY EXAM HIP UNI 2-3 VIEWS: CPT | Mod: TC,HCNC,PO,RT

## 2024-05-02 PROCEDURE — 3008F BODY MASS INDEX DOCD: CPT | Mod: HCNC,CPTII,S$GLB, | Performed by: NURSE PRACTITIONER

## 2024-05-02 PROCEDURE — 1126F AMNT PAIN NOTED NONE PRSNT: CPT | Mod: HCNC,CPTII,S$GLB, | Performed by: NURSE PRACTITIONER

## 2024-05-02 PROCEDURE — 3078F DIAST BP <80 MM HG: CPT | Mod: HCNC,CPTII,S$GLB, | Performed by: NURSE PRACTITIONER

## 2024-05-02 PROCEDURE — 3074F SYST BP LT 130 MM HG: CPT | Mod: HCNC,CPTII,S$GLB, | Performed by: NURSE PRACTITIONER

## 2024-05-02 PROCEDURE — 1157F ADVNC CARE PLAN IN RCRD: CPT | Mod: HCNC,CPTII,S$GLB, | Performed by: NURSE PRACTITIONER

## 2024-05-02 PROCEDURE — 73502 X-RAY EXAM HIP UNI 2-3 VIEWS: CPT | Mod: 26,HCNC,RT, | Performed by: RADIOLOGY

## 2024-05-02 PROCEDURE — 3288F FALL RISK ASSESSMENT DOCD: CPT | Mod: HCNC,CPTII,S$GLB, | Performed by: NURSE PRACTITIONER

## 2024-05-02 PROCEDURE — 99213 OFFICE O/P EST LOW 20 MIN: CPT | Mod: HCNC,S$GLB,, | Performed by: NURSE PRACTITIONER

## 2024-05-02 PROCEDURE — 1101F PT FALLS ASSESS-DOCD LE1/YR: CPT | Mod: HCNC,CPTII,S$GLB, | Performed by: NURSE PRACTITIONER

## 2024-05-02 PROCEDURE — 99999 PR PBB SHADOW E&M-EST. PATIENT-LVL IV: CPT | Mod: PBBFAC,HCNC,, | Performed by: NURSE PRACTITIONER

## 2024-05-02 PROCEDURE — 1159F MED LIST DOCD IN RCRD: CPT | Mod: HCNC,CPTII,S$GLB, | Performed by: NURSE PRACTITIONER

## 2024-05-02 PROCEDURE — 1160F RVW MEDS BY RX/DR IN RCRD: CPT | Mod: HCNC,CPTII,S$GLB, | Performed by: NURSE PRACTITIONER

## 2024-05-02 RX ORDER — MELOXICAM 15 MG/1
15 TABLET ORAL DAILY
Qty: 5 TABLET | Refills: 0 | Status: SHIPPED | OUTPATIENT
Start: 2024-05-02 | End: 2024-06-05

## 2024-05-02 NOTE — PROGRESS NOTES
"Subjective:       Patient ID: Charu Lopez is a 74 y.o. female.    Chief Complaint: Hip Pain (Right hip pain; pt state pain increases when getting up from sitting and especially walking stairs )      Patient is a 74 y.o. female who traditionally follows with Sahil Garibay DO presenting today for:     Hip Pain  Patient reports right hip pain x 3 weeks without trauma. She notes it has not been very responsive to OTC NSAIDs and Tylenol. She reports pain is okay with sitting 1/10 and increases to 5/10 with sitting to standing. Climbing stairs is the worst (8/10).     Review of patient's allergies indicates:   Allergen Reactions    Benadryl [diphenhydramine hcl]     Tetracyclines      Stomach upset         Medication List with Changes/Refills   New Medications    MELOXICAM (MOBIC) 15 MG TABLET    Take 1 tablet (15 mg total) by mouth once daily.   Current Medications    MAGNESIUM 250 MG TAB    Take by mouth once.    MULTIVITAMIN (THERAGRAN) PER TABLET    Take 1 tablet by mouth once daily.    PSYLLIUM 0.52 GRAM CAPSULE    Take 0.52 g by mouth once daily.     Medical, social and surgical history has been reviewed.     Review of Systems   Musculoskeletal:  Positive for joint pain.      Objective:   /72 (BP Location: Right arm, Patient Position: Sitting, BP Method: Medium (Manual))   Pulse 68   Temp 97.9 °F (36.6 °C) (Temporal)   Resp 16   Ht 5' 6" (1.676 m)   Wt 72.3 kg (159 lb 6.3 oz)   SpO2 97%   BMI 25.73 kg/m²     Physical Exam  Vitals reviewed.   Constitutional:       Appearance: Normal appearance.   HENT:      Head: Normocephalic and atraumatic.   Pulmonary:      Effort: Pulmonary effort is normal.   Musculoskeletal:      Right hip: Tenderness present. No bony tenderness. Normal range of motion (pain with lateral flexion and flexion). Normal strength.   Skin:     General: Skin is warm and dry.   Neurological:      Mental Status: She is alert and oriented to person, place, and " time.       Assessment and Plan:     1. Pain of right hip  - X-Ray Hip 2 or 3 views Right (with Pelvis when performed); Future  - meloxicam (MOBIC) 15 MG tablet; Take 1 tablet (15 mg total) by mouth once daily.  Dispense: 5 tablet; Refill: 0

## 2024-06-05 ENCOUNTER — HOSPITAL ENCOUNTER (OUTPATIENT)
Dept: RADIOLOGY | Facility: HOSPITAL | Age: 74
Discharge: HOME | End: 2024-06-05
Attending: INTERNAL MEDICINE
Payer: MEDICARE

## 2024-06-05 ENCOUNTER — OFFICE VISIT (OUTPATIENT)
Dept: PULMONOLOGY | Facility: CLINIC | Age: 74
End: 2024-06-05
Payer: MEDICARE

## 2024-06-05 VITALS
SYSTOLIC BLOOD PRESSURE: 126 MMHG | DIASTOLIC BLOOD PRESSURE: 84 MMHG | BODY MASS INDEX: 26.05 KG/M2 | HEIGHT: 66 IN | HEART RATE: 77 BPM | OXYGEN SATURATION: 98 % | WEIGHT: 162.06 LBS

## 2024-06-05 DIAGNOSIS — J47.9 BRONCHIECTASIS WITHOUT COMPLICATION: ICD-10-CM

## 2024-06-05 DIAGNOSIS — J92.0 PLEURAL PLAQUE DUE TO ASBESTOS EXPOSURE: ICD-10-CM

## 2024-06-05 DIAGNOSIS — R91.1 INCIDENTAL PULMONARY NODULE: ICD-10-CM

## 2024-06-05 DIAGNOSIS — R91.1 SOLITARY PULMONARY NODULE: ICD-10-CM

## 2024-06-05 DIAGNOSIS — R91.1 SOLITARY PULMONARY NODULE: Primary | ICD-10-CM

## 2024-06-05 PROCEDURE — 71250 CT THORAX DX C-: CPT | Mod: 26,,, | Performed by: STUDENT IN AN ORGANIZED HEALTH CARE EDUCATION/TRAINING PROGRAM

## 2024-06-05 PROCEDURE — 1101F PT FALLS ASSESS-DOCD LE1/YR: CPT | Mod: CPTII,S$GLB,, | Performed by: INTERNAL MEDICINE

## 2024-06-05 PROCEDURE — 99999 PR PBB SHADOW E&M-EST. PATIENT-LVL III: CPT | Mod: PBBFAC,,, | Performed by: INTERNAL MEDICINE

## 2024-06-05 PROCEDURE — 1157F ADVNC CARE PLAN IN RCRD: CPT | Mod: CPTII,S$GLB,, | Performed by: INTERNAL MEDICINE

## 2024-06-05 PROCEDURE — 71250 CT THORAX DX C-: CPT | Mod: TC

## 2024-06-05 PROCEDURE — 1160F RVW MEDS BY RX/DR IN RCRD: CPT | Mod: CPTII,S$GLB,, | Performed by: INTERNAL MEDICINE

## 2024-06-05 PROCEDURE — 3288F FALL RISK ASSESSMENT DOCD: CPT | Mod: CPTII,S$GLB,, | Performed by: INTERNAL MEDICINE

## 2024-06-05 PROCEDURE — 3008F BODY MASS INDEX DOCD: CPT | Mod: CPTII,S$GLB,, | Performed by: INTERNAL MEDICINE

## 2024-06-05 PROCEDURE — 1159F MED LIST DOCD IN RCRD: CPT | Mod: CPTII,S$GLB,, | Performed by: INTERNAL MEDICINE

## 2024-06-05 PROCEDURE — 99214 OFFICE O/P EST MOD 30 MIN: CPT | Mod: S$GLB,,, | Performed by: INTERNAL MEDICINE

## 2024-06-05 PROCEDURE — 3079F DIAST BP 80-89 MM HG: CPT | Mod: CPTII,S$GLB,, | Performed by: INTERNAL MEDICINE

## 2024-06-05 PROCEDURE — 3074F SYST BP LT 130 MM HG: CPT | Mod: CPTII,S$GLB,, | Performed by: INTERNAL MEDICINE

## 2024-06-05 RX ORDER — AZITHROMYCIN 250 MG/1
TABLET, FILM COATED ORAL
Qty: 6 TABLET | Refills: 0 | Status: SHIPPED | OUTPATIENT
Start: 2024-06-05 | End: 2024-06-10

## 2024-06-05 RX ORDER — AZITHROMYCIN 250 MG/1
TABLET, FILM COATED ORAL
Qty: 6 TABLET | Refills: 0 | Status: SHIPPED | OUTPATIENT
Start: 2024-06-05 | End: 2024-06-05

## 2024-06-05 NOTE — PROGRESS NOTES
"Subjective:       Patient ID: Charu Lopez is a 74 y.o. female.    Chief Complaint: Pulmonary Nodules    Here for yearly follow up for pulmonary nodules.    73 yo lifetime nonsmoker who works as a .  Does have a h/o asbestos exposure.  Initially seen due to pulmonary nodules.  Three month follow up with a stable RUL nodule in the setting of some tree in bud nodularity.  RUL nodule was 8mm.  In May 2023, it was recommended that she follow up in 6 months with CT for follow up.  She is here one year later.      Denies any respiratory issues.  Still works as a .            Review of Systems   Constitutional:  Negative for fever, weight loss and night sweats.   Respiratory:  Negative for cough, hemoptysis, sputum production, shortness of breath and wheezing.    Gastrointestinal:  Negative for acid reflux.       Objective:      Vitals:    06/05/24 1513   BP: 126/84   BP Location: Right arm   Patient Position: Sitting   Pulse: 77   SpO2: 98%   Weight: 73.5 kg (162 lb 0.6 oz)   Height: 5' 6" (1.676 m)      Physical Exam  Personal Diagnostic Review    Previous 8mm nodule seems to have resolved.  New area of focal nodularity abutting previous area of bronchiectasis that is enlarged.  Stable asbestos pleural plaque        6/5/2024     3:13 PM 5/2/2024    10:40 AM 12/21/2023    10:12 AM 10/17/2023    10:39 AM 9/5/2023    10:45 AM 9/5/2023    10:30 AM 9/5/2023    10:15 AM   Pulmonary Function Tests   SpO2 98 % 97 % 98 % 95 % 100 % 100 % 97 %   Height 5' 6" (1.676 m) 5' 6" (1.676 m) 5' 6" (1.676 m) 5' 6" (1.676 m)      Weight 73.5 kg (162 lb 0.6 oz) 72.3 kg (159 lb 6.3 oz) 70.7 kg (155 lb 13.8 oz) 71.7 kg (158 lb)      BMI (Calculated) 26.2 25.7 25.2 25.5            Assessment:       1. Solitary pulmonary nodule    2. Incidental pulmonary nodule    3. 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 Bronchiectasis without complication        Outpatient Encounter Medications as of 6/5/2024 "   Medication Sig Dispense Refill    magnesium 250 mg Tab Take by mouth once.      multivitamin (THERAGRAN) per tablet Take 1 tablet by mouth once daily.      psyllium 0.52 gram capsule Take 0.52 g by mouth once daily.      azithromycin (Z-CATHY) 250 MG tablet Take 2 tablets by mouth on day 1; Take 1 tablet by mouth on days 2-5 6 tablet 0    [DISCONTINUED] azithromycin (Z-CATHY) 250 MG tablet Take 2 tablets by mouth on day 1; Take 1 tablet by mouth on days 2-5 6 tablet 0    [DISCONTINUED] meloxicam (MOBIC) 15 MG tablet Take 1 tablet (15 mg total) by mouth once daily. 5 tablet 0     No facility-administered encounter medications on file as of 6/5/2024.     Orders Placed This Encounter   Procedures    CT Chest Without Contrast     Chest Navigational Bronchoscopy     Standing Status:   Future     Standing Expiration Date:   6/5/2025     Scheduling Instructions:      Chest Navigational Bronchoscopy     Order Specific Question:   May the Radiologist modify the order per protocol to meet the clinical needs of the patient?     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  Initially stable in 5/2023  New area of nodules in similar location, one of which is more focal.    Offered biopsy.    Patient is low risk for lung cancer, however, due to size of nodule can consider biopsy vs surveillance.    Patient has decided on surveillance.  Will treat for inflammation/infection with Zpak and follow up in three months.  Patient has been scheduled while in clinic to ensure timely follow up.          Bronchiectasis    Overview     Asymptomatic.  Present on imaging.   May be contributing to mucous impaction/nodules found.            Other Visit Diagnoses       Solitary pulmonary nodule    -  Primary    Relevant Orders    CT Chest  Without Contrast

## 2024-06-07 ENCOUNTER — OFFICE VISIT (OUTPATIENT)
Dept: OPTOMETRY | Facility: CLINIC | Age: 74
End: 2024-06-07
Payer: COMMERCIAL

## 2024-06-07 DIAGNOSIS — H52.203 MYOPIA WITH ASTIGMATISM AND PRESBYOPIA, BILATERAL: Primary | ICD-10-CM

## 2024-06-07 DIAGNOSIS — H52.13 MYOPIA WITH ASTIGMATISM AND PRESBYOPIA, BILATERAL: Primary | ICD-10-CM

## 2024-06-07 DIAGNOSIS — Z13.5 GLAUCOMA SCREENING: ICD-10-CM

## 2024-06-07 DIAGNOSIS — H25.13 NUCLEAR SCLEROSIS, BILATERAL: ICD-10-CM

## 2024-06-07 DIAGNOSIS — H52.4 MYOPIA WITH ASTIGMATISM AND PRESBYOPIA, BILATERAL: Primary | ICD-10-CM

## 2024-06-07 PROCEDURE — 99999 PR PBB SHADOW E&M-EST. PATIENT-LVL III: CPT | Mod: PBBFAC,,, | Performed by: OPTOMETRIST

## 2024-06-07 PROCEDURE — 92015 DETERMINE REFRACTIVE STATE: CPT | Mod: S$GLB,,, | Performed by: OPTOMETRIST

## 2024-06-07 PROCEDURE — 92014 COMPRE OPH EXAM EST PT 1/>: CPT | Mod: S$GLB,,, | Performed by: OPTOMETRIST

## 2024-06-07 NOTE — PROGRESS NOTES
HPI    75 Y/o female is here for routine eye exam with no C/o about ocular health     Pt denies pain and discomfort   Occ floaters     Eye med: Soothe Xp OU QD   Last edited by Abelino Michaud MA on 6/7/2024  2:44 PM.            Assessment /Plan     For exam results, see Encounter Report.    Myopia with astigmatism and presbyopia, bilateral    Nuclear sclerosis, bilateral    Glaucoma screening      Cat OU--pt wishes new Rx  EULALIO--pt to cont w ATs    PLAN:    Rtc 1 yr

## 2024-07-18 ENCOUNTER — OFFICE VISIT (OUTPATIENT)
Dept: INTERNAL MEDICINE | Facility: CLINIC | Age: 74
End: 2024-07-18
Payer: MEDICARE

## 2024-07-18 VITALS
RESPIRATION RATE: 18 BRPM | BODY MASS INDEX: 26.05 KG/M2 | SYSTOLIC BLOOD PRESSURE: 128 MMHG | WEIGHT: 162.06 LBS | OXYGEN SATURATION: 98 % | TEMPERATURE: 98 F | HEIGHT: 66 IN | HEART RATE: 78 BPM | DIASTOLIC BLOOD PRESSURE: 72 MMHG

## 2024-07-18 DIAGNOSIS — Z00.00 ENCOUNTER FOR MEDICARE ANNUAL WELLNESS EXAM: Primary | ICD-10-CM

## 2024-07-18 DIAGNOSIS — E66.3 OVERWEIGHT (BMI 25.0-29.9): ICD-10-CM

## 2024-07-18 DIAGNOSIS — J92.0 PLEURAL PLAQUE DUE TO ASBESTOS EXPOSURE: ICD-10-CM

## 2024-07-18 DIAGNOSIS — M85.859 OSTEOPENIA OF HIP, UNSPECIFIED LATERALITY: ICD-10-CM

## 2024-07-18 DIAGNOSIS — J84.10 CALCIFIED GRANULOMA OF LUNG: ICD-10-CM

## 2024-07-18 PROCEDURE — 3078F DIAST BP <80 MM HG: CPT | Mod: HCNC,CPTII,S$GLB, | Performed by: NURSE PRACTITIONER

## 2024-07-18 PROCEDURE — G0439 PPPS, SUBSEQ VISIT: HCPCS | Mod: HCNC,S$GLB,, | Performed by: NURSE PRACTITIONER

## 2024-07-18 PROCEDURE — 99999 PR PBB SHADOW E&M-EST. PATIENT-LVL IV: CPT | Mod: PBBFAC,HCNC,, | Performed by: NURSE PRACTITIONER

## 2024-07-18 PROCEDURE — 3288F FALL RISK ASSESSMENT DOCD: CPT | Mod: HCNC,CPTII,S$GLB, | Performed by: NURSE PRACTITIONER

## 2024-07-18 PROCEDURE — 1170F FXNL STATUS ASSESSED: CPT | Mod: HCNC,CPTII,S$GLB, | Performed by: NURSE PRACTITIONER

## 2024-07-18 PROCEDURE — 1159F MED LIST DOCD IN RCRD: CPT | Mod: HCNC,CPTII,S$GLB, | Performed by: NURSE PRACTITIONER

## 2024-07-18 PROCEDURE — 1123F ACP DISCUSS/DSCN MKR DOCD: CPT | Mod: HCNC,CPTII,S$GLB, | Performed by: NURSE PRACTITIONER

## 2024-07-18 PROCEDURE — 1101F PT FALLS ASSESS-DOCD LE1/YR: CPT | Mod: HCNC,CPTII,S$GLB, | Performed by: NURSE PRACTITIONER

## 2024-07-18 PROCEDURE — 3074F SYST BP LT 130 MM HG: CPT | Mod: HCNC,CPTII,S$GLB, | Performed by: NURSE PRACTITIONER

## 2024-07-18 NOTE — PATIENT INSTRUCTIONS
Counseling and Referral of Other Preventative  (Italic type indicates deductible and co-insurance are waived)    Patient Name: Charu Lopez  Today's Date: 7/18/2024    Health Maintenance       Date Due Completion Date    Shingles Vaccine (1 of 2) Never done ---    RSV Vaccine (Age 60+ and Pregnant patients) (1 - 1-dose 60+ series) Never done ---    COVID-19 Vaccine (5 - 2023-24 season) 05/19/2024 1/19/2024    Mammogram 09/29/2024 9/29/2023    Override on 10/28/2019: Done    Override on 9/14/2016: Done    Influenza Vaccine (1) 09/01/2024 ---    DEXA Scan 09/14/2027 9/14/2023    Override on 2/23/2017: Done    TETANUS VACCINE 01/05/2028 1/5/2018    Override on 2/23/2017: Declined    Colorectal Cancer Screening 09/05/2028 9/5/2023    Override on 2/23/2017: Done    Lipid Panel 12/14/2028 12/14/2023        No orders of the defined types were placed in this encounter.    The following information is provided to all patients.  This information is to help you find resources for any of the problems found today that may be affecting your health:                  Living healthy guide: www.[facility].[place].gov      Understanding Diabetes: www.diabetes.org      Eating healthy: www.cdc.gov/healthyweight      CDC home safety checklist: www.cdc.gov/steadi/patient.html      Agency on Aging: www.goea.[place].gov      Alcoholics anonymous (AA): www.aa.org      Physical Activity: www.fred.nih.gov/in5yein      Tobacco use: www.quitwithusla.org

## 2024-07-18 NOTE — PROGRESS NOTES
"  Charu Lopez presented for a  Medicare AWV and comprehensive Health Risk Assessment today. The following components were reviewed and updated:    Medical history  Family History  Social history  Allergies and Current Medications  Health Risk Assessment  Health Maintenance  Care Team         ** See Completed Assessments for Annual Wellness Visit within the encounter summary.**         The following assessments were completed:  Living Situation  CAGE  Depression Screening  Timed Get Up and Go  Whisper Test  Cognitive Function Screening  Nutrition Screening  ADL Screening  PAQ Screening      Opioid documentation:      Patient does not have a current opioid prescription.        Vitals:    07/18/24 1307   BP: 128/72   BP Location: Right arm   Patient Position: Sitting   BP Method: Small (Manual)   Pulse: 78   Resp: 18   Temp: 98.2 °F (36.8 °C)   TempSrc: Temporal   SpO2: 98%   Weight: 73.5 kg (162 lb 0.6 oz)   Height: 5' 6" (1.676 m)     Body mass index is 26.15 kg/m².  Physical Exam  Vitals and nursing note reviewed.   Constitutional:       Appearance: She is well-developed.   HENT:      Head: Normocephalic and atraumatic.      Right Ear: External ear normal.      Left Ear: External ear normal.      Nose: Nose normal.   Eyes:      Pupils: Pupils are equal, round, and reactive to light.   Cardiovascular:      Rate and Rhythm: Normal rate and regular rhythm.      Heart sounds: Normal heart sounds.   Pulmonary:      Effort: Pulmonary effort is normal.      Breath sounds: Normal breath sounds.   Musculoskeletal:         General: Normal range of motion.      Cervical back: Normal range of motion.   Skin:     General: Skin is warm and dry.   Neurological:      Mental Status: She is alert and oriented to person, place, and time.               Diagnoses and health risks identified today and associated recommendations/orders:    1. Encounter for Medicare annual wellness exam  Health Maintenance updated   Records " reviewed   Exam done   - Ambulatory Referral/Consult to Enhanced Annual Wellness Visit (eAWV)    2. 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 Followed by PCP.     3. Overweight (BMI 25.0-29.9)  BMI reviewed.    4. Calcified granuloma of lung  Stable and chronic. Followed by PCP.     5. Osteopenia of hip, unspecified laterality  Stable and chronic. Followed by PCP.       Counseling and Referral of Other Preventative  (Italic type indicates deductible and co-insurance are waived)    Patient Name: Charu Lopez  Today's Date: 7/18/2024    Health Maintenance         Date Due Completion Date    Shingles Vaccine (1 of 2) Never done ---    RSV Vaccine (Age 60+ and Pregnant patients) (1 - 1-dose 60+ series) Never done ---    COVID-19 Vaccine (5 - 2023-24 season) 05/19/2024 1/19/2024    Mammogram 09/29/2024 9/29/2023    Override on 10/28/2019: Done    Override on 9/14/2016: Done    Influenza Vaccine (1) 09/01/2024 ---    DEXA Scan 09/14/2027 9/14/2023    Override on 2/23/2017: Done    TETANUS VACCINE 01/05/2028 1/5/2018    Override on 2/23/2017: Declined    Colorectal Cancer Screening 09/05/2028 9/5/2023    Override on 2/23/2017: Done    Lipid Panel 12/14/2028 12/14/2023          No orders of the defined types were placed in this encounter.    Provided Charu Stein with a 5-10 year written screening schedule and personal prevention plan. Recommendations were developed using the USPSTF age appropriate recommendations. Education, counseling, and referrals were provided as needed. After Visit Summary printed and given to patient which includes a list of additional screenings\tests needed.    Follow up in about 1 year (around 7/18/2025) for medicare annual wellness visit.    Cher Ruiz NP    I offered to discuss end of life issues, including information on how to make advance directives that the patient could use to name someone who would make medical decisions on their behalf if they  became too ill to make themselves.    ___Patient declined  _X_Patient is interested, I provided paper work and offered to discuss.

## 2024-07-23 ENCOUNTER — PATIENT MESSAGE (OUTPATIENT)
Dept: CARDIOLOGY | Facility: CLINIC | Age: 74
End: 2024-07-23
Payer: MEDICARE

## 2024-07-23 DIAGNOSIS — R07.89 ATYPICAL CHEST PAIN: Primary | ICD-10-CM

## 2024-07-24 ENCOUNTER — HOSPITAL ENCOUNTER (OUTPATIENT)
Dept: CARDIOLOGY | Facility: CLINIC | Age: 74
Discharge: HOME | End: 2024-07-24
Payer: MEDICARE

## 2024-07-24 ENCOUNTER — OFFICE VISIT (OUTPATIENT)
Dept: CARDIOLOGY | Facility: CLINIC | Age: 74
End: 2024-07-24
Payer: MEDICARE

## 2024-07-24 VITALS
HEIGHT: 66 IN | DIASTOLIC BLOOD PRESSURE: 74 MMHG | WEIGHT: 163.81 LBS | OXYGEN SATURATION: 99 % | BODY MASS INDEX: 26.33 KG/M2 | HEART RATE: 69 BPM | SYSTOLIC BLOOD PRESSURE: 139 MMHG

## 2024-07-24 DIAGNOSIS — R07.89 ATYPICAL CHEST PAIN: ICD-10-CM

## 2024-07-24 DIAGNOSIS — R07.89 ATYPICAL CHEST PAIN: Primary | ICD-10-CM

## 2024-07-24 DIAGNOSIS — E66.3 OVERWEIGHT (BMI 25.0-29.9): ICD-10-CM

## 2024-07-24 LAB
OHS QRS DURATION: 92 MS
OHS QTC CALCULATION: 394 MS

## 2024-07-24 PROCEDURE — 93010 ELECTROCARDIOGRAM REPORT: CPT | Mod: HCNC,S$GLB,, | Performed by: INTERNAL MEDICINE

## 2024-07-24 PROCEDURE — 99999 PR PBB SHADOW E&M-EST. PATIENT-LVL III: CPT | Mod: PBBFAC,HCNC,,

## 2024-07-24 NOTE — PROGRESS NOTES
"     General Cardiology Clinic Note  Last Clinic Visit: 1/26/23 with Dr. Thomas   General Cardiologist: Dr. Thomas    HPI:     Charu Lopez is a 74 y.o. , who presents for chest pain.    PROBLEM LIST:  No cardiac hx  CAC score 0 in 2023  Pulmonary nodules    Interval HPI:   She presents today to discuss a "sharp chest pain" she experienced this weekend. At the time of onset she was sitting at home completing a puzzle. She denies associated SOB, N/V, palpitations, diaphoresis, lightheadedness or syncope. It is not reproducible with certain movements/positions or palpation. The pain waxed and waned throughout Saturday, into Sunday. She reports this same pain has happened a few times before. She had a calcium score of 0 last year, and a recent chest CT 1 month ago again noted no atheromatous disease in the coronary arteries. Outside of this chest discomfort, she denies DAWSON, sustained palpitations, edema, lightheadedness or syncope. Weight stable. She admits to not being very active - she works as a  and completes her ADLs without issue. BP is mildly elevated in clinic today, but historically normal at clinic visits - was 128/72 at her annual wellness visit 6 days ago. Last FLP done on 12/14/23: LDL - 45. She is not on any lipid lowering therapy. Kidney function is stable, last Cr 0.9. She has no known family history of cardiovascular disease. She is not diabetic, and has never smoked.    1/2023 HPI (Dr. Thomas)  She is here for CVD risk. The patient has no SOB, TIA, palpitations, syncope or pre-syncope. PCP in October said "Pt c/o a few months of intermittent symptoms of anterior chest wall "twinges" or sharp pain. They seem to last a few seconds at a time and usually at rest. No additional cardiopulmonary symptoms, hx of cardiac disease and normal stress test in 2017. No relation to eating". /80 in primary recently. Some left arm pain, some pain and discoloration at times right index " finget and right calf.      Surgical: Reviewed, as below.  Family: Reviewed, as below.   Social: Reviewed, as below.    ROS:    Pertinent ROS included in HPI and below.  PMH:     Past Medical History:   Diagnosis Date    Allergy birth    benadryl    Cataract     Colon polyps     Endometrioma of ovary     Fever blister     Osteoarthritis-knee, hand 1/21/2020    Osteopenia     Skin disease august 29, 2019     Past Surgical History:   Procedure Laterality Date    BREAST BIOPSY  91    wnl     COLONOSCOPY N/A 2/24/2020    Procedure: COLONOSCOPY;  Surgeon: STEPHANIE Webb MD;  [facility]  Service: Endoscopy;  Laterality: N/A;    COLONOSCOPY N/A 9/5/2023    Procedure: COLONOSCOPY;  Surgeon: Uriel Hanson MD;  Location: [facility]);  Service: Endoscopy;  Laterality: N/A;  Referred by: Dr. Webb  Prep: radha (pt requested)  Route instructions sent: [facility] and mailed-Kpvt    ENDOMETRIAL BIOPSY  12/12/2012    laparatomy 1994      OOPHORECTOMY  1995    Left ov removal due to endometrioma    SKIN BIOPSY  1990s     Allergies:     Review of patient's allergies indicates:   Allergen Reactions    Benadryl [diphenhydramine hcl]     Tetracyclines      Stomach upset       Medications:     Current Outpatient Medications on File Prior to Visit   Medication Sig Dispense Refill    magnesium 250 mg Tab Take by mouth once.      multivitamin (THERAGRAN) per tablet Take 1 tablet by mouth once daily.      psyllium 0.52 gram capsule Take 0.52 g by mouth once daily.       No current facility-administered medications on file prior to visit.     Social History:     Social History     Tobacco Use    Smoking status: Never     Passive exposure: Never    Smokeless tobacco: Never   Substance Use Topics    Alcohol use: No     Family History:     Family History   Problem Relation Name Age of Onset    Cancer Father 58     Colon cancer Father 58 58    Diabetes Father 58     Hypertension Father 58     Stroke Mother  "72     Dementia Mother 72     Alzheimer's disease Mother 72     Alzheimer's disease Sister      Polycystic kidney disease Daughter 1     No Known Problems Son 1     Breast cancer Neg Hx      Ovarian cancer Neg Hx      Amblyopia Neg Hx      Strabismus Neg Hx      Thyroid disease Neg Hx      Retinal detachment Neg Hx      Macular degeneration Neg Hx      Glaucoma Neg Hx      Cataracts Neg Hx      Blindness Neg Hx      Melanoma Neg Hx      Psoriasis Neg Hx       Physical Exam:   /74   Pulse 69   Ht 5' 6" (1.676 m)   Wt 74.3 kg (163 lb 12.8 oz)   SpO2 99%   BMI 26.44 kg/m²      Physical Exam  Constitutional:       Appearance: Normal appearance.   Neck:      Vascular: No carotid bruit.   Cardiovascular:      Rate and Rhythm: Normal rate and regular rhythm.      Pulses:           Carotid pulses are 2+ on the right side and 2+ on the left side.       Radial pulses are 2+ on the right side and 2+ on the left side.        Dorsalis pedis pulses are 2+ on the right side and 2+ on the left side.        Posterior tibial pulses are 2+ on the right side and 2+ on the left side.      Heart sounds: Normal heart sounds.   Pulmonary:      Effort: Pulmonary effort is normal.      Breath sounds: Normal breath sounds.   Musculoskeletal:      Right lower leg: No edema.      Left lower leg: No edema.   Skin:     General: Skin is warm and dry.   Neurological:      Mental Status: She is alert.          Labs:     Blood Tests:  Lab Results   Component Value Date     12/14/2023    K 3.9 12/14/2023     12/14/2023    CO2 24 12/14/2023    BUN 14 12/14/2023    CREATININE 0.9 12/14/2023    GLU 92 12/14/2023    HGBA1C 5.3 12/14/2023    MG 2.3 04/20/2017    AST 20 12/14/2023    ALT 16 12/14/2023    ALBUMIN 3.7 12/14/2023    PROT 7.1 12/14/2023    BILITOT 0.7 12/14/2023    WBC 4.70 12/14/2023    HGB 14.2 12/14/2023    HCT 45.3 12/14/2023    MCV 98 12/14/2023     12/14/2023    INR 0.9 04/11/2013    TSH 1.499 12/14/2023 " "      Lab Results   Component Value Date    CHOL 114 (L) 2023    HDL 63 2023    TRIG 26 (L) 2023       Lab Results   Component Value Date    LDLCALC 45.8 (L) 2023       Lab Results   Component Value Date    TSH 1.499 2023       Lab Results   Component Value Date    HGBA1C 5.3 2023         Imaging:     Echocardiogram  None    Stress testing  SUKHDEEP 2017  EKG Conclusions:   1. The EKG portion of this study is negative for ischemia at a moderate workload, and peak heart rate of 160 bpm (108% of predicted).   2. Exercise capacity is average.   3. Blood pressure response to exercise was normal (Presenting BP: 161/86 Peak BP: 215/91).   4. No significant arrhythmias were present.   5. There were no symptoms of chest discomfort or significant dyspnea throughout the protocol.   6. The Duke treadmill score was 4 suggesting an intermediate probability for future cardiovascular events.   CONCLUSIONS     1 - Normal left ventricular systolic function (EF 60-65%).     2 - Normal left ventricular diastolic function.     3 - Normal right ventricular systolic function .   No evidence of stress induced myocardial ischemia.     Cath Lab  None    Other  Chest CT 2024  No atheromatous disease is seen in the aorta.   The coronary arteries show no atheromatous disease.     CT Calcium Scoring 2023  Agatston score 0.  Standard interpretation for your calcium score is as follows: 0 = a negative examination with negative predictive value greater than 90-95% for absence of "significant" coronary artery disease.  When calculated for age and gender, patient is in the 0-25th percentile for females between the ages of 70 and 74 for cardiovascular risk.    DANIELA 2017  No evidence of any significant arterial stenosis.   Normal DANIELA.     EK24 - Normal sinus rhythm with sinus arrhythmia, 72 bpm   Nonspecific ST abnormality   When compared with ECG of 2023 10:22, No significant change was " found    Assessment:     1. Atypical chest pain    2. Overweight (BMI 25.0-29.9)        Plan:     Atypical chest pain  Overall unclear etiology - does not seem to be cardiac in nature. EKG today SR with sinus arrhythmia.   We had a shared discussion regarding whether or not to proceed with a treadmill stress test. With an atypical description combined with a recent calcium score of 0, the likelihood of a stress test being positive is very low. She was very receptive to my reassurance and ultimately elected to hold off on stress testing for now. I reviewed ACS protocol/concerning symptoms to look out for. She understands what kind of symptoms she should notify us about, and namely when to present to the ER.    Overweight (BMI 25.0-29.9)  Diet/exercise goals reinforced.      Signed:  Lauren Vlosich, PA-C [facility]     7/24/2024     Follow-up:     Future Appointments   Date Time Provider Department Center   7/24/2024  1:15 PM EKG, APPT [facility] EKG [address]   8/29/2024  9:50 AM Mary Kenny MD [facility] [place]   9/27/2024 10:15 AM [facility] OIC-CT2 500 LB LIMIT [facility] CTS [facility]

## 2024-08-29 ENCOUNTER — OFFICE VISIT (OUTPATIENT)
Dept: DERMATOLOGY | Facility: CLINIC | Age: 74
End: 2024-08-29
Payer: MEDICARE

## 2024-08-29 DIAGNOSIS — L91.8 SKIN TAG: ICD-10-CM

## 2024-08-29 DIAGNOSIS — D48.5 NEOPLASM OF UNCERTAIN BEHAVIOR OF SKIN: Primary | ICD-10-CM

## 2024-08-29 DIAGNOSIS — D22.9 NEVUS: ICD-10-CM

## 2024-08-29 DIAGNOSIS — L82.1 SK (SEBORRHEIC KERATOSIS): ICD-10-CM

## 2024-08-29 DIAGNOSIS — L29.9 PRURITUS: ICD-10-CM

## 2024-08-29 DIAGNOSIS — D18.01 CHERRY ANGIOMA: ICD-10-CM

## 2024-08-29 DIAGNOSIS — L29.9 SCALP PRURITUS: ICD-10-CM

## 2024-08-29 PROCEDURE — 1160F RVW MEDS BY RX/DR IN RCRD: CPT | Mod: HCNC,CPTII,S$GLB, | Performed by: DERMATOLOGY

## 2024-08-29 PROCEDURE — 3288F FALL RISK ASSESSMENT DOCD: CPT | Mod: HCNC,CPTII,S$GLB, | Performed by: DERMATOLOGY

## 2024-08-29 PROCEDURE — 11102 TANGNTL BX SKIN SINGLE LES: CPT | Mod: HCNC,S$GLB,, | Performed by: DERMATOLOGY

## 2024-08-29 PROCEDURE — 1101F PT FALLS ASSESS-DOCD LE1/YR: CPT | Mod: HCNC,CPTII,S$GLB, | Performed by: DERMATOLOGY

## 2024-08-29 PROCEDURE — 99213 OFFICE O/P EST LOW 20 MIN: CPT | Mod: 25,HCNC,S$GLB, | Performed by: DERMATOLOGY

## 2024-08-29 PROCEDURE — 1157F ADVNC CARE PLAN IN RCRD: CPT | Mod: HCNC,CPTII,S$GLB, | Performed by: DERMATOLOGY

## 2024-08-29 PROCEDURE — 1126F AMNT PAIN NOTED NONE PRSNT: CPT | Mod: HCNC,CPTII,S$GLB, | Performed by: DERMATOLOGY

## 2024-08-29 PROCEDURE — 99999 PR PBB SHADOW E&M-EST. PATIENT-LVL III: CPT | Mod: PBBFAC,HCNC,, | Performed by: DERMATOLOGY

## 2024-08-29 PROCEDURE — 1159F MED LIST DOCD IN RCRD: CPT | Mod: HCNC,CPTII,S$GLB, | Performed by: DERMATOLOGY

## 2024-08-29 RX ORDER — MOMETASONE FUROATE 1 MG/ML
SOLUTION TOPICAL
Qty: 60 ML | Refills: 3 | Status: SHIPPED | OUTPATIENT
Start: 2024-08-29

## 2024-08-29 RX ORDER — PERMETHRIN 50 MG/G
CREAM TOPICAL
Qty: 60 G | Refills: 1 | Status: SHIPPED | OUTPATIENT
Start: 2024-08-29

## 2024-08-29 NOTE — PATIENT INSTRUCTIONS
Shave Biopsy Wound Care    Your doctor has performed a shave biopsy today.  A band aid and vaseline ointment has been placed over the site.  This should remain in place for NO LONGER THAN 48 hours.  It is fine to remove the bandaid after 24 hours, if the area is no longer bleeding. It is recommended that you keep the area dry (do not wet)) for the first 24 hours.  After 24 hours, wash the area with warm soap and water and apply Vaseline jelly.  Many patients prefer to use Neosporin or Bacitracin ointment.  This is acceptable; however, know that you can develop an allergy to this medication even if you have used it safely for years.  It is important to keep the area moist.  Letting it dry out and get air slows healing time, and will worsen the scar.        If you notice increasing redness, tenderness, pain, or yellow drainage at the biopsy site, please notify your doctor.  These are signs of an infection.    If your biopsy site is bleeding, apply firm pressure for 15 minutes straight.  Repeat for another 15 minutes, if it is still bleeding.   If the surgical site continues to bleed, then please contact your doctor.      For [facility] users:   You will receive your biopsy results in [facility] as soon as they are available. Please be assured that your physician/provider will review your results and will then determine what further treatment, evaluation, or planning is required. You should be contacted by your physician's/provider's office within 5 business days of receiving your results; If not, please reach out to directly. This is one more way [employer] is putting you first.     [facility] [place] [address]/ [phone] FAX/ [email or web address]

## 2024-09-03 ENCOUNTER — PATIENT MESSAGE (OUTPATIENT)
Dept: DERMATOLOGY | Facility: CLINIC | Age: 74
End: 2024-09-03
Payer: MEDICARE

## 2024-09-04 ENCOUNTER — TELEPHONE (OUTPATIENT)
Dept: INTERNAL MEDICINE | Facility: CLINIC | Age: 74
End: 2024-09-04
Payer: MEDICARE

## 2024-09-04 ENCOUNTER — TELEPHONE (OUTPATIENT)
Dept: DERMATOLOGY | Facility: CLINIC | Age: 74
End: 2024-09-04
Payer: MEDICARE

## 2024-09-04 DIAGNOSIS — Z12.31 ENCOUNTER FOR SCREENING MAMMOGRAM FOR MALIGNANT NEOPLASM OF BREAST: Primary | ICD-10-CM

## 2024-09-04 NOTE — TELEPHONE ENCOUNTER
----- Message from Lawrence Cordero sent at 9/4/2024  8:43 AM CDT -----  Regarding: Mammogram Orders Fax  Contact: Pt [phone]  1MEDICALADVICE     Patient is calling for Medical Advice regarding: Patient called to request mammogram orders be sent to this fax: [phone]      How long has patient had these symptoms:    Pharmacy name and phone#:    Patient wants a call back or thru [facility]: Call    Comments:    Please advise patient replies from provider may take up to 48 hours.

## 2024-09-04 NOTE — TELEPHONE ENCOUNTER
[facility] ----- Message from Mary Kenny MD sent at 9/3/2024  6:26 PM CDT -----  Ms. Lpoez, your pathology report indicates a benign, non cancerous lesion. No further treatment is needed at this time. Thank you for allowing me to care for you and take care, Dr. Kenny    Pls let pt know benign. thanks

## 2024-09-04 NOTE — TELEPHONE ENCOUNTER
----- Message from Lawrence Cordero sent at 9/4/2024  8:43 AM CDT -----  Regarding: Mammogram Orders Fax  Contact: Pt [phone]  1MEDICALADVICE     Patient is calling for Medical Advice regarding: Patient called to request mammogram orders be sent to this fax: [phone]      How long has patient had these symptoms:    Pharmacy name and phone#:    Patient wants a call back or thru [facility]: Call    Comments:    Please advise patient replies from provider may take up to 48 hours.

## 2024-09-27 ENCOUNTER — HOSPITAL ENCOUNTER (OUTPATIENT)
Dept: RADIOLOGY | Facility: HOSPITAL | Age: 74
Discharge: HOME | End: 2024-09-27
Attending: INTERNAL MEDICINE
Payer: MEDICARE

## 2024-09-27 DIAGNOSIS — R91.1 SOLITARY PULMONARY NODULE: ICD-10-CM

## 2024-09-27 PROCEDURE — 71250 CT THORAX DX C-: CPT | Mod: TC,HCNC

## 2024-09-27 PROCEDURE — 71250 CT THORAX DX C-: CPT | Mod: 26,HCNC,, | Performed by: RADIOLOGY

## 2024-09-30 ENCOUNTER — TELEPHONE (OUTPATIENT)
Dept: INTERNAL MEDICINE | Facility: CLINIC | Age: 74
End: 2024-09-30
Payer: MEDICARE

## 2024-09-30 DIAGNOSIS — R30.0 DYSURIA: Primary | ICD-10-CM

## 2024-09-30 NOTE — TELEPHONE ENCOUNTER
----- Message from Ella sent at 9/30/2024 10:07 AM CDT -----  Contact: [phone]  1MEDICALADVICE     Patient is calling for Medical Advice regarding:  UTI frequent     How long has patient had these symptoms: 3 days    Pharmacy name and phone#:   [id number] - [address] [facility] [address] [place] [address]  Phone: [phone] Fax: [phone]        Patient wants a call back or thru [facility]:call    Comments:Patient does not have access to portal. Patient would like orders for Urinalysis     Please advise patient replies from provider may take up to 48 hours.

## 2024-10-01 ENCOUNTER — PATIENT MESSAGE (OUTPATIENT)
Dept: INTERNAL MEDICINE | Facility: CLINIC | Age: 74
End: 2024-10-01
Payer: MEDICARE

## 2024-10-01 ENCOUNTER — LAB VISIT (OUTPATIENT)
Dept: LAB | Facility: HOSPITAL | Age: 74
End: 2024-10-01
Attending: INTERNAL MEDICINE
Payer: MEDICARE

## 2024-10-01 DIAGNOSIS — R30.0 DYSURIA: ICD-10-CM

## 2024-10-01 LAB
BACTERIA #/AREA URNS AUTO: ABNORMAL /HPF
BILIRUB UR QL STRIP: NEGATIVE
CLARITY UR REFRACT.AUTO: CLEAR
COLOR UR AUTO: YELLOW
GLUCOSE UR QL STRIP: NEGATIVE
HGB UR QL STRIP: NEGATIVE
KETONES UR QL STRIP: NEGATIVE
LEUKOCYTE ESTERASE UR QL STRIP: ABNORMAL
MICROSCOPIC COMMENT: ABNORMAL
NITRITE UR QL STRIP: NEGATIVE
PH UR STRIP: 6 [PH] (ref 5–8)
PROT UR QL STRIP: NEGATIVE
RBC #/AREA URNS AUTO: 2 /HPF (ref 0–4)
SP GR UR STRIP: 1.01 (ref 1–1.03)
SQUAMOUS #/AREA URNS AUTO: 1 /HPF
URN SPEC COLLECT METH UR: ABNORMAL
WBC #/AREA URNS AUTO: 11 /HPF (ref 0–5)

## 2024-10-01 PROCEDURE — 81001 URINALYSIS AUTO W/SCOPE: CPT | Mod: HCNC | Performed by: INTERNAL MEDICINE

## 2024-10-01 PROCEDURE — 87086 URINE CULTURE/COLONY COUNT: CPT | Mod: HCNC | Performed by: INTERNAL MEDICINE

## 2024-10-02 ENCOUNTER — PATIENT MESSAGE (OUTPATIENT)
Dept: INTERNAL MEDICINE | Facility: CLINIC | Age: 74
End: 2024-10-02
Payer: MEDICARE

## 2024-10-02 ENCOUNTER — TELEPHONE (OUTPATIENT)
Dept: INTERNAL MEDICINE | Facility: CLINIC | Age: 74
End: 2024-10-02
Payer: MEDICARE

## 2024-10-02 LAB — BACTERIA UR CULT: NO GROWTH

## 2024-10-02 NOTE — TELEPHONE ENCOUNTER
----- Message from Charu sent at 10/2/2024 10:27 AM CDT -----  Contact: Pt  [phone]  2TESTRESULTS    Type: Test Results    What test was performed? Urine Sample    Who ordered the test? Dr Garibay    When and where were the test performed? 10/1/24 [facility]    Would you like a call back and or thru [facility]: Call back ..     Comments:   Patient is leaving EARLY in the AM to go out of state and needs to know if she needs to  a prescription or not by this evening.    Please call and advise.    Thank You

## 2024-10-03 ENCOUNTER — TELEPHONE (OUTPATIENT)
Dept: PULMONOLOGY | Facility: CLINIC | Age: 74
End: 2024-10-03
Payer: MEDICARE

## 2024-10-03 NOTE — TELEPHONE ENCOUNTER
Called and left message to review CT results.  Asbestos related pleural plaques stable, benign disease.  Pulmonary nodules stable to improved and likely represent inflammation.  Denied any respiratory complaints.  Lifetime nonsmoker, low risk for malignancy.  No follow up necessary.

## 2024-12-17 ENCOUNTER — PATIENT MESSAGE (OUTPATIENT)
Dept: INTERNAL MEDICINE | Facility: CLINIC | Age: 74
End: 2024-12-17
Payer: MEDICARE

## 2024-12-17 ENCOUNTER — TELEPHONE (OUTPATIENT)
Dept: INTERNAL MEDICINE | Facility: CLINIC | Age: 74
End: 2024-12-17
Payer: MEDICARE

## 2024-12-17 DIAGNOSIS — E78.5 DYSLIPIDEMIA: ICD-10-CM

## 2024-12-17 DIAGNOSIS — R53.83 FATIGUE, UNSPECIFIED TYPE: Primary | ICD-10-CM

## 2024-12-17 DIAGNOSIS — R73.9 ELEVATED BLOOD SUGAR: ICD-10-CM

## 2024-12-17 NOTE — TELEPHONE ENCOUNTER
Pt called saying they are having high bp and inflammation, would like a crp & cbc to see what the reason is before scheduling appt

## 2024-12-18 PROBLEM — R03.0 BLOOD PRESSURE ELEVATED WITHOUT HISTORY OF HTN: Status: ACTIVE | Noted: 2024-12-18

## 2024-12-18 NOTE — TELEPHONE ENCOUNTER
Schedule her an annual visit for eval. Can be with Duyen if needed sooner   Full labs ordered with CRP. Want done before visit

## 2024-12-20 ENCOUNTER — LAB VISIT (OUTPATIENT)
Dept: LAB | Facility: HOSPITAL | Age: 74
End: 2024-12-20
Attending: INTERNAL MEDICINE
Payer: MEDICARE

## 2024-12-20 DIAGNOSIS — R53.83 FATIGUE, UNSPECIFIED TYPE: ICD-10-CM

## 2024-12-20 DIAGNOSIS — R73.9 ELEVATED BLOOD SUGAR: ICD-10-CM

## 2024-12-20 DIAGNOSIS — E78.5 DYSLIPIDEMIA: ICD-10-CM

## 2024-12-20 LAB
AMORPH CRY UR QL COMP ASSIST: ABNORMAL
BACTERIA #/AREA URNS AUTO: ABNORMAL /HPF
BILIRUB UR QL STRIP: NEGATIVE
CLARITY UR REFRACT.AUTO: ABNORMAL
COLOR UR AUTO: YELLOW
GLUCOSE UR QL STRIP: NEGATIVE
HGB UR QL STRIP: NEGATIVE
KETONES UR QL STRIP: NEGATIVE
LEUKOCYTE ESTERASE UR QL STRIP: NEGATIVE
MICROSCOPIC COMMENT: ABNORMAL
NITRITE UR QL STRIP: NEGATIVE
PH UR STRIP: 8 [PH] (ref 5–8)
PROT UR QL STRIP: NEGATIVE
SP GR UR STRIP: 1.02 (ref 1–1.03)
SQUAMOUS #/AREA URNS AUTO: 2 /HPF
URN SPEC COLLECT METH UR: ABNORMAL

## 2024-12-20 PROCEDURE — 81001 URINALYSIS AUTO W/SCOPE: CPT | Mod: HCNC | Performed by: INTERNAL MEDICINE

## 2024-12-26 ENCOUNTER — OFFICE VISIT (OUTPATIENT)
Dept: INTERNAL MEDICINE | Facility: CLINIC | Age: 74
End: 2024-12-26
Payer: MEDICARE

## 2024-12-26 VITALS
WEIGHT: 165 LBS | OXYGEN SATURATION: 99 % | HEIGHT: 66 IN | BODY MASS INDEX: 26.52 KG/M2 | HEART RATE: 78 BPM | RESPIRATION RATE: 16 BRPM | DIASTOLIC BLOOD PRESSURE: 84 MMHG | TEMPERATURE: 97 F | SYSTOLIC BLOOD PRESSURE: 128 MMHG

## 2024-12-26 DIAGNOSIS — Z00.00 ANNUAL PHYSICAL EXAM: Primary | ICD-10-CM

## 2024-12-26 DIAGNOSIS — R25.2 LEG CRAMPS: ICD-10-CM

## 2024-12-26 DIAGNOSIS — R03.0 BLOOD PRESSURE ELEVATED WITHOUT HISTORY OF HTN: ICD-10-CM

## 2024-12-26 PROCEDURE — 1126F AMNT PAIN NOTED NONE PRSNT: CPT | Mod: HCNC,CPTII,S$GLB, | Performed by: NURSE PRACTITIONER

## 2024-12-26 PROCEDURE — 1159F MED LIST DOCD IN RCRD: CPT | Mod: HCNC,CPTII,S$GLB, | Performed by: NURSE PRACTITIONER

## 2024-12-26 PROCEDURE — 3044F HG A1C LEVEL LT 7.0%: CPT | Mod: HCNC,CPTII,S$GLB, | Performed by: NURSE PRACTITIONER

## 2024-12-26 PROCEDURE — 1160F RVW MEDS BY RX/DR IN RCRD: CPT | Mod: HCNC,CPTII,S$GLB, | Performed by: NURSE PRACTITIONER

## 2024-12-26 PROCEDURE — 1157F ADVNC CARE PLAN IN RCRD: CPT | Mod: HCNC,CPTII,S$GLB, | Performed by: NURSE PRACTITIONER

## 2024-12-26 PROCEDURE — 3079F DIAST BP 80-89 MM HG: CPT | Mod: HCNC,CPTII,S$GLB, | Performed by: NURSE PRACTITIONER

## 2024-12-26 PROCEDURE — 99397 PER PM REEVAL EST PAT 65+ YR: CPT | Mod: HCNC,S$GLB,, | Performed by: NURSE PRACTITIONER

## 2024-12-26 PROCEDURE — 3008F BODY MASS INDEX DOCD: CPT | Mod: HCNC,CPTII,S$GLB, | Performed by: NURSE PRACTITIONER

## 2024-12-26 PROCEDURE — 99999 PR PBB SHADOW E&M-EST. PATIENT-LVL V: CPT | Mod: PBBFAC,HCNC,, | Performed by: NURSE PRACTITIONER

## 2024-12-26 PROCEDURE — 3074F SYST BP LT 130 MM HG: CPT | Mod: HCNC,CPTII,S$GLB, | Performed by: NURSE PRACTITIONER

## 2024-12-26 NOTE — PROGRESS NOTES
Answers submitted by the patient for this visit:  High Blood Pressure Questionnaire (Submitted on 12/24/2024)  Chief Complaint: Hypertension  Chronicity: new  Onset: 1 to 4 weeks ago  Progression since onset: waxing and waning  Condition status: uncontrolled  anxiety: Yes  blurred vision: No  chest pain: Yes  headaches: Yes  malaise/fatigue: Yes    Subjective:     PCP: Sahil Garibay DO Elizabeth Sarita Loepz is a 74 y.o. female who presents for an annual exam.    Pt reports a recent bout of elevated blood pressures int he 150s which was concerning as she does not have a prior diagnosis of HTN. During elevations pt was without any  reportable side effects.    Previous reports of leg cramps which improved with OTC magnesium supplementation which she reports was helpful. She ran out and has not resumed and states her cramps are very infrequent.     She reports episodic ecchymosis to random joints in her fingers that are not associated with pain or trauma. They resolve spontaneously.     She also reports random chest pains that are without any sequelae or associated symptoms. She denies any other acute concerns.     Medical History:   Past Medical History:   Diagnosis Date    Allergy birth    benadryl    Cataract     Colon polyps     Endometrioma of ovary     Fever blister     Osteoarthritis-knee, hand 1/21/2020    Osteopenia     Skin disease august 29, 2019       Family History: family history includes Alzheimer's disease in her mother and sister; Cancer in her father; Colon cancer (age of onset: 58) in her father; Dementia in her mother; Diabetes in her father; Hypertension in her father; No Known Problems in her son; Polycystic kidney disease in her daughter; Stroke in her mother.    Surgical History:   Past Surgical History:   Procedure Laterality Date    BREAST BIOPSY  91    wnl     COLONOSCOPY N/A 2/24/2020    Procedure: COLONOSCOPY;  Surgeon: STEPHANIE Webb MD;  Location: [address]  [facility]);  Service: Endoscopy;  Laterality: N/A;    COLONOSCOPY N/A 9/5/2023    Procedure: COLONOSCOPY;  Surgeon: Uriel Hanson MD;  Location: [facility] (4TH FLR);  Service: Endoscopy;  Laterality: N/A;  Referred by: Dr. Webb  Prep: radha (pt requested)  Route instructions sent: [facility] and mailed-K[facility]vt    ENDOMETRIAL BIOPSY  12/12/2012    laparatomy 1994      OOPHORECTOMY  1995    Left ov removal due to endometrioma    SKIN BIOPSY  1990s        Social History:  reports that she has never smoked. She has never been exposed to tobacco smoke. She has never used smokeless tobacco. She reports that she does not drink alcohol and does not use drugs.     Allergies:   Review of patient's allergies indicates:   Allergen Reactions    Benadryl [diphenhydramine hcl]     Tetracyclines      Stomach upset         Medications:   Current Outpatient Medications   Medication Sig    multivitamin (THERAGRAN) per tablet Take 1 tablet by mouth once daily.    permethrin (ELIMITE) 5 % cream Apply to affected areas and wash off in am and repeat in 1 week    psyllium 0.52 gram capsule Take 0.52 g by mouth once daily.    magnesium 250 mg Tab Take by mouth once.     No current facility-administered medications for this visit.       Health Maintenance:   Health Maintenance Topics with due status: Not Due       Topic Last Completion Date    TETANUS VACCINE 01/05/2018    Colorectal Cancer Screening 09/05/2023    DEXA Scan 09/14/2023    Mammogram 10/25/2024    Lipid Panel 12/20/2024     Lab Results   Component Value Date    HEPCAB Negative 03/22/2017     Eye Exam: Last Ophthalmology Visit: 10/4/2023 [facility] OPHTHALMOLOGY   Dental Exam:   OB/GYN: No data on file.   Pap smear: 10/19/2021  Mammogram: [unfilled]    Colonoscopy: Last Colonoscopy completed on 9/5/2023   FitKit:    Cologuard:   HIV:   Hepatitic C  Ab:     Vaccinations:  Immunization History   Administered Date(s) Administered    COVID-19, MRNA, LN-S, PF (Pfizer) (Purple Cap)  02/20/2021, 03/13/2021, 10/29/2021    Td (ADULT) 06/01/2004, 01/05/2018    Td - PF (ADULT) 01/05/2018     Influenza:   Tetanus:   Shingrix:   Pneumovax:   Prevnar-13:   Covid vaccine:    There is no height or weight on file to calculate BMI.  Wt Readings from Last 3 Encounters:   07/24/24 74.3 kg (163 lb 12.8 oz)   07/18/24 73.5 kg (162 lb 0.6 oz)   06/05/24 73.5 kg (162 lb 0.6 oz)       Review of Systems       Objective:     Physical Exam         Assessment:        1. Annual physical exam    2. Blood pressure elevated without history of HTN    3. Leg cramps           Plan:     1. Annual physical exam    2. Blood pressure elevated without history of HTN    3. Leg cramps      RTC in weeks/months for follow-up or sooner if needed    __________________________            neck pain: No  orthopnea: No  palpitations: No  peripheral edema: No  PND: No  shortness of breath: Yes  sweats: No  Agents associated with hypertension: no associated agents  CAD risks: obesity, post-menopausal state, sedentary lifestyle  Compliance problems: no compliance problems  Past treatments: nothing

## 2024-12-26 NOTE — PROGRESS NOTES
Subjective:       Patient ID: Charu Lopez is a 74 y.o. female.    Chief Complaint: Hypertension    History of Present Illness    CHIEF COMPLAINT:  Patient presents today for follow-up on high blood pressure.    HYPERTENSION:  She experienced a blood pressure spike to 154/89. Previous blood pressure readings were also elevated. She does not have a blood pressure cuff at home. She has gained 10 lbs recently.    MUSCULOSKELETAL:  She experiences occasional nocturnal leg cramps. She previously took magnesium which provided relief.    GENITOURINARY:  She had a urinary tract infection that persisted for a couple months before resolving with antibiotic treatment.    CONSTITUTIONAL:  She experienced a self-limited fever of 100.4°F lasting one day.    INTEGUMENTARY:  She experiences spontaneous bruising in her fingers appearing dark blue or black approximately 5-6 times per year, without associated pain.      ROS:  Constitutional: +fevers, +weight gain  Musculoskeletal: +muscle cramps         Objective:      Physical Exam  Vitals reviewed.   Constitutional:       Appearance: Normal appearance.   HENT:      Head: Normocephalic and atraumatic.   Eyes:      Conjunctiva/sclera: Conjunctivae normal.      Pupils: Pupils are equal, round, and reactive to light.   Cardiovascular:      Rate and Rhythm: Normal rate and regular rhythm.   Pulmonary:      Effort: Pulmonary effort is normal.      Breath sounds: Normal breath sounds.   Abdominal:      General: Bowel sounds are normal.      Palpations: Abdomen is soft.   Musculoskeletal:         General: Normal range of motion.      Cervical back: Normal range of motion and neck supple.   Skin:     General: Skin is warm.   Neurological:      General: No focal deficit present.   Psychiatric:         Mood and Affect: Mood normal.         Assessment:       1. Annual physical exam    2. Blood pressure elevated without history of HTN    3. Leg cramps      Plan:       Assessment  & Plan    IMPRESSION:  Assessed hypertension follow-up, noting blood pressure has normalized (124/--) from previous elevated readings  Evaluated report of intermittent chest pain, noting normal EKG in July and upcoming cardiology appointment  Considered occasional finger bruising as likely benign vascular events  Reviewed lab results, noting normal cholesterol panel and C-reactive protein within normal range    HYPERTENSION:  - Explained physiologic stress can cause temporary blood pressure elevation.  - Discussed salt intake's effect on blood pressure due to increased fluid volume.  - Patient to monitor blood pressure 2 times daily for 2 weeks using an upper arm cuff.  - Recommend being mindful of salt intake.  - Contact the office if blood pressure readings are consistently greater than 140/90.    CRAMPS:  - Explained magnesium's role in muscle relaxation and potential benefit for leg cramps.  - Patient can consider resuming magnesium supplementation if leg cramps return.    FOLLOW-UP:  - Follow up in 2 weeks with blood pressure log.  - Virtual visit may be scheduled if necessary based on blood pressure log results.         This note was generated with the assistance of ambient listening technology. Verbal consent was obtained by the patient and accompanying visitor(s) for the recording of patient appointment to facilitate this note. I attest to having reviewed and edited the generated note for accuracy, though some syntax or spelling errors may persist. Please contact the author of this note for any clarification.       Answers submitted by the patient for this visit:  High Blood Pressure Questionnaire (Submitted on 12/24/2024)  Chief Complaint: Hypertension  Chronicity: new  Onset: 1 to 4 weeks ago  Progression since onset: waxing and waning  Condition status: uncontrolled  anxiety: Yes  blurred vision: No  chest pain: Yes  headaches: Yes  malaise/fatigue: Yes  neck pain: No  orthopnea: No  palpitations:  No  peripheral edema: No  PND: No  shortness of breath: Yes  sweats: No  Agents associated with hypertension: no associated agents  CAD risks: obesity, post-menopausal state, sedentary lifestyle  Compliance problems: no compliance problems  Past treatments: nothing

## 2025-01-28 ENCOUNTER — PATIENT MESSAGE (OUTPATIENT)
Dept: INTERNAL MEDICINE | Facility: CLINIC | Age: 75
End: 2025-01-28
Payer: MEDICARE

## 2025-05-23 ENCOUNTER — OFFICE VISIT (OUTPATIENT)
Dept: INTERNAL MEDICINE | Facility: CLINIC | Age: 75
End: 2025-05-23
Payer: MEDICARE

## 2025-05-23 VITALS
DIASTOLIC BLOOD PRESSURE: 78 MMHG | WEIGHT: 169.19 LBS | BODY MASS INDEX: 27.19 KG/M2 | HEART RATE: 75 BPM | TEMPERATURE: 98 F | SYSTOLIC BLOOD PRESSURE: 128 MMHG | HEIGHT: 66 IN | OXYGEN SATURATION: 97 % | RESPIRATION RATE: 18 BRPM

## 2025-05-23 DIAGNOSIS — E66.3 OVERWEIGHT (BMI 25.0-29.9): ICD-10-CM

## 2025-05-23 DIAGNOSIS — R91.1 INCIDENTAL PULMONARY NODULE: ICD-10-CM

## 2025-05-23 DIAGNOSIS — R03.0 BLOOD PRESSURE ELEVATED WITHOUT HISTORY OF HTN: ICD-10-CM

## 2025-05-23 DIAGNOSIS — Z00.00 ENCOUNTER FOR MEDICARE ANNUAL WELLNESS EXAM: Primary | ICD-10-CM

## 2025-05-23 PROBLEM — M85.859 OSTEOPENIA OF HIP: Status: RESOLVED | Noted: 2020-01-21 | Resolved: 2025-05-23

## 2025-05-23 PROCEDURE — 99999 PR PBB SHADOW E&M-EST. PATIENT-LVL IV: CPT | Mod: PBBFAC,HCNC,,

## 2025-05-23 NOTE — PATIENT INSTRUCTIONS
Counseling and Referral of Other Preventative  (Italic type indicates deductible and co-insurance are waived)    Patient Name: Charu Lopez  Today's Date: 5/23/2025    Health Maintenance       Date Due Completion Date    Shingles Vaccine (1 of 2) Never done ---    RSV Vaccine (Age 60+ and Pregnant patients) (1 - Risk 60-74 years 1-dose series) Never done ---    Influenza Vaccine (Season Ended) 09/01/2025 ---    Mammogram 10/25/2025 10/25/2024    Override on 10/28/2019: Done    Override on 9/14/2016: Done    DEXA Scan 09/14/2027 9/14/2023    Override on 2/23/2017: Done    TETANUS VACCINE 01/05/2028 1/5/2018    Override on 2/23/2017: Declined    Colorectal Cancer Screening 09/05/2028 9/5/2023    Override on 2/23/2017: Done    Lipid Panel 12/20/2029 12/20/2024        No orders of the defined types were placed in this encounter.    The following information is provided to all patients.  This information is to help you find resources for any of the problems found today that may be affecting your health:                  Living healthy guide: www.[facility].[place].gov      Understanding Diabetes: www.diabetes.org      Eating healthy: www.cdc.gov/healthyweight      [facility] home safety checklist: www.cdc.gov/steadi/patient.html      Agency on Aging: www.goea.[place].[facility]      Alcoholics anonymous (AA): www.aa.org      Physical Activity: www.fred.nih.gov/yn3qluq      Tobacco use: www.quitwithusla.org

## 2025-05-23 NOTE — PROGRESS NOTES
"  Charu Lopez presented for a follow-up Medicare AWV today. The following components were reviewed and updated:    Medical history  Family History  Social history  Allergies and Current Medications  Health Risk Assessment  Health Maintenance  Care Team    **See Completed Assessments for Annual Wellness visit with in the encounter summary    The following assessments were completed:  Depression Screening  Cognitive function Screening    Timed Get Up Test  Whisper Test      Opioid documentation:      Patient does not have a current opioid prescription.          Vitals:    05/23/25 0945 05/23/25 1034   BP: 132/82 128/78   BP Location: Left arm Left arm   Patient Position: Sitting Sitting   Pulse: 75    Resp: 18    Temp: 98 °F (36.7 °C)    TempSrc: Oral    SpO2: 97%    Weight: 76.8 kg (169 lb 3.3 oz)    Height: 5' 6" (1.676 m)      Body mass index is 27.31 kg/m².       Physical Exam  Vitals reviewed.   Constitutional:       Appearance: Normal appearance.   HENT:      Head: Normocephalic and atraumatic.   Cardiovascular:      Rate and Rhythm: Normal rate and regular rhythm.      Pulses: Normal pulses.           Radial pulses are 2+ on the right side and 2+ on the left side.        Dorsalis pedis pulses are 2+ on the right side and 2+ on the left side.        Posterior tibial pulses are 2+ on the right side and 2+ on the left side.      Heart sounds: Normal heart sounds.   Pulmonary:      Effort: Pulmonary effort is normal.      Breath sounds: Normal breath sounds.   Musculoskeletal:      Right lower leg: No edema.      Left lower leg: No edema.   Skin:     General: Skin is warm and dry.      Capillary Refill: Capillary refill takes less than 2 seconds.   Neurological:      General: No focal deficit present.      Mental Status: She is alert and oriented to person, place, and time.   Psychiatric:         Mood and Affect: Mood normal.         Behavior: Behavior normal.         Diagnoses and health risks " identified today and associated recommendations/orders:  1. Encounter for Medicare annual wellness exam  Assessment and evaluation performed as stated above.    2. Blood pressure elevated without history of HTN  Stable on diet and exercise.  Patient to follow up with PCP    3. Overweight (BMI 25.0-29.9)  Stable.  Encouraged patient to increase exercise and reduce caloric intake.  Follow up with PCP    4. Incidental pulmonary nodule  Stable/possible resolution.  Patient low risk for lung cancer.  CT chest performed 09/27/2024 shows:  Right upper lobe peripheral tree-in-bud opacities with dominant nodule now measuring 5 mm (series 4, image 224), previously 10 mm.   Patient to follow-up with pulmonology      Provided Charu Stein with a 5-10 year written screening schedule and personal prevention plan. Recommendations were developed using the USPSTF age appropriate recommendations. Education, counseling, and referrals were provided as needed.  After Visit Summary printed and given to patient which includes a list of additional screenings\tests needed.    Follow up in about 1 year (around 5/23/2026), or if symptoms worsen or fail to improve.      Calista Kenney, ISIDRA    I offered to discuss advanced care planning, including how to pick a person who would make decisions for you if you were unable to make them for yourself, called a health care power of , and what kind of decisions you might make such as use of life sustaining treatments such as ventilators and tube feeding when faced with a life limiting illness recorded on a living will that they will need to know. (How you want to be cared for as you near the end of your natural life)     X  Patient has advanced directives on file, which we reviewed, and they do not wish to make changes.

## 2025-07-01 ENCOUNTER — PATIENT MESSAGE (OUTPATIENT)
Dept: INTERNAL MEDICINE | Facility: CLINIC | Age: 75
End: 2025-07-01
Payer: MEDICARE

## 2025-07-01 DIAGNOSIS — E78.5 DYSLIPIDEMIA: Primary | ICD-10-CM

## 2025-07-16 ENCOUNTER — LAB VISIT (OUTPATIENT)
Dept: LAB | Facility: HOSPITAL | Age: 75
End: 2025-07-16
Attending: INTERNAL MEDICINE
Payer: MEDICARE

## 2025-07-16 DIAGNOSIS — E78.5 DYSLIPIDEMIA: ICD-10-CM

## 2025-07-16 LAB
ABSOLUTE EOSINOPHIL (OHS): 0.22 K/UL
ABSOLUTE MONOCYTE (OHS): 0.6 K/UL (ref 0.3–1)
ABSOLUTE NEUTROPHIL COUNT (OHS): 1.82 K/UL (ref 1.8–7.7)
ALBUMIN SERPL BCP-MCNC: 3.8 G/DL (ref 3.5–5.2)
ALP SERPL-CCNC: 88 UNIT/L (ref 40–150)
ALT SERPL W/O P-5'-P-CCNC: 15 UNIT/L (ref 10–44)
ANION GAP (OHS): 8 MMOL/L (ref 8–16)
AST SERPL-CCNC: 20 UNIT/L (ref 11–45)
BASOPHILS # BLD AUTO: 0.04 K/UL
BASOPHILS NFR BLD AUTO: 0.9 %
BILIRUB SERPL-MCNC: 0.5 MG/DL (ref 0.1–1)
BUN SERPL-MCNC: 13 MG/DL (ref 8–23)
CALCIUM SERPL-MCNC: 9.5 MG/DL (ref 8.7–10.5)
CHLORIDE SERPL-SCNC: 108 MMOL/L (ref 95–110)
CHOLEST SERPL-MCNC: 106 MG/DL (ref 120–199)
CHOLEST/HDLC SERPL: 1.6 {RATIO} (ref 2–5)
CO2 SERPL-SCNC: 25 MMOL/L (ref 23–29)
CREAT SERPL-MCNC: 0.8 MG/DL (ref 0.5–1.4)
ERYTHROCYTE [DISTWIDTH] IN BLOOD BY AUTOMATED COUNT: 13.1 % (ref 11.5–14.5)
GFR SERPLBLD CREATININE-BSD FMLA CKD-EPI: >60 ML/MIN/1.73/M2
GLUCOSE SERPL-MCNC: 84 MG/DL (ref 70–110)
HCT VFR BLD AUTO: 43.5 % (ref 37–48.5)
HDLC SERPL-MCNC: 67 MG/DL (ref 40–75)
HDLC SERPL: 63.2 % (ref 20–50)
HGB BLD-MCNC: 14.3 GM/DL (ref 12–16)
IMM GRANULOCYTES # BLD AUTO: 0.01 K/UL (ref 0–0.04)
IMM GRANULOCYTES NFR BLD AUTO: 0.2 % (ref 0–0.5)
LDLC SERPL CALC-MCNC: 36.6 MG/DL (ref 63–159)
LYMPHOCYTES # BLD AUTO: 1.65 K/UL (ref 1–4.8)
MCH RBC QN AUTO: 31.5 PG (ref 27–31)
MCHC RBC AUTO-ENTMCNC: 32.9 G/DL (ref 32–36)
MCV RBC AUTO: 96 FL (ref 82–98)
NONHDLC SERPL-MCNC: 39 MG/DL
NUCLEATED RBC (/100WBC) (OHS): 0 /100 WBC
PLATELET # BLD AUTO: 234 K/UL (ref 150–450)
PMV BLD AUTO: 11.7 FL (ref 9.2–12.9)
POTASSIUM SERPL-SCNC: 4.6 MMOL/L (ref 3.5–5.1)
PROT SERPL-MCNC: 6.8 GM/DL (ref 6–8.4)
RBC # BLD AUTO: 4.54 M/UL (ref 4–5.4)
RELATIVE EOSINOPHIL (OHS): 5.1 %
RELATIVE LYMPHOCYTE (OHS): 38 % (ref 18–48)
RELATIVE MONOCYTE (OHS): 13.8 % (ref 4–15)
RELATIVE NEUTROPHIL (OHS): 42 % (ref 38–73)
SODIUM SERPL-SCNC: 141 MMOL/L (ref 136–145)
TRIGL SERPL-MCNC: 12 MG/DL (ref 30–150)
WBC # BLD AUTO: 4.34 K/UL (ref 3.9–12.7)

## 2025-07-16 PROCEDURE — 83718 ASSAY OF LIPOPROTEIN: CPT | Mod: HCNC

## 2025-07-16 PROCEDURE — 84520 ASSAY OF UREA NITROGEN: CPT | Mod: HCNC

## 2025-07-16 PROCEDURE — 85025 COMPLETE CBC W/AUTO DIFF WBC: CPT | Mod: HCNC

## 2025-07-16 PROCEDURE — 36415 COLL VENOUS BLD VENIPUNCTURE: CPT | Mod: HCNC,PO

## 2025-07-23 ENCOUNTER — OFFICE VISIT (OUTPATIENT)
Dept: INTERNAL MEDICINE | Facility: CLINIC | Age: 75
End: 2025-07-23
Payer: MEDICARE

## 2025-07-23 VITALS
BODY MASS INDEX: 27.07 KG/M2 | SYSTOLIC BLOOD PRESSURE: 122 MMHG | HEART RATE: 88 BPM | DIASTOLIC BLOOD PRESSURE: 80 MMHG | OXYGEN SATURATION: 97 % | WEIGHT: 168.44 LBS | HEIGHT: 66 IN | RESPIRATION RATE: 17 BRPM | TEMPERATURE: 98 F

## 2025-07-23 DIAGNOSIS — E78.5 DYSLIPIDEMIA: ICD-10-CM

## 2025-07-23 DIAGNOSIS — R53.83 FATIGUE, UNSPECIFIED TYPE: ICD-10-CM

## 2025-07-23 DIAGNOSIS — M79.671 RIGHT FOOT PAIN: Primary | ICD-10-CM

## 2025-07-23 PROBLEM — Z00.00 ANNUAL PHYSICAL EXAM: Status: RESOLVED | Noted: 2023-06-22 | Resolved: 2025-07-23

## 2025-07-23 PROCEDURE — 3008F BODY MASS INDEX DOCD: CPT | Mod: CPTII,HCNC,S$GLB, | Performed by: INTERNAL MEDICINE

## 2025-07-23 PROCEDURE — 1157F ADVNC CARE PLAN IN RCRD: CPT | Mod: CPTII,HCNC,S$GLB, | Performed by: INTERNAL MEDICINE

## 2025-07-23 PROCEDURE — 3079F DIAST BP 80-89 MM HG: CPT | Mod: CPTII,HCNC,S$GLB, | Performed by: INTERNAL MEDICINE

## 2025-07-23 PROCEDURE — 3074F SYST BP LT 130 MM HG: CPT | Mod: CPTII,HCNC,S$GLB, | Performed by: INTERNAL MEDICINE

## 2025-07-23 PROCEDURE — 3288F FALL RISK ASSESSMENT DOCD: CPT | Mod: CPTII,HCNC,S$GLB, | Performed by: INTERNAL MEDICINE

## 2025-07-23 PROCEDURE — 1160F RVW MEDS BY RX/DR IN RCRD: CPT | Mod: CPTII,HCNC,S$GLB, | Performed by: INTERNAL MEDICINE

## 2025-07-23 PROCEDURE — 1159F MED LIST DOCD IN RCRD: CPT | Mod: CPTII,HCNC,S$GLB, | Performed by: INTERNAL MEDICINE

## 2025-07-23 PROCEDURE — 1126F AMNT PAIN NOTED NONE PRSNT: CPT | Mod: CPTII,HCNC,S$GLB, | Performed by: INTERNAL MEDICINE

## 2025-07-23 PROCEDURE — 99999 PR PBB SHADOW E&M-EST. PATIENT-LVL IV: CPT | Mod: PBBFAC,HCNC,, | Performed by: INTERNAL MEDICINE

## 2025-07-23 PROCEDURE — 1101F PT FALLS ASSESS-DOCD LE1/YR: CPT | Mod: CPTII,HCNC,S$GLB, | Performed by: INTERNAL MEDICINE

## 2025-07-23 PROCEDURE — 99214 OFFICE O/P EST MOD 30 MIN: CPT | Mod: HCNC,S$GLB,, | Performed by: INTERNAL MEDICINE

## 2025-07-23 NOTE — PROGRESS NOTES
Subjective     Patient ID: Charu Lopez is a 75 y.o. female.    Chief Complaint: Annual Exam and Follow-up    HPI  Charu presents today for follow up.    FOOT PAIN:  She reports ongoing foot pain attributed to a possible bone spur, rating it 3/10 and varying in intensity depending on foot position. The pain significantly impacts her walking and worsens with certain foot movements. X-ray in December 2023 confirmed no fracture.    URINARY TRACT INFECTION:  She experienced UTI symptoms for approximately two months starting in October before seeking care at an alternative facility in December. Initial urinalysis was reported as normal despite symptoms. Subsequent urine culture at the alternative facility confirmed infection and treatment was initiated.    MUSCULOSKELETAL:  She reports experiencing leg cramps at night without associated symptoms or precipitating factors.    LABORATORY RESULTS:  CBC shows marginally elevated Mean Corpuscular Hemoglobin (MCH) at 0.5. A1C is stable at 5.5, remaining normal for past six years.   Review of Systems   Constitutional:  Positive for fatigue. Negative for activity change, appetite change, chills, diaphoresis, fever and unexpected weight change.   HENT:  Negative for postnasal drip, rhinorrhea, sinus pressure/congestion, sneezing, sore throat, trouble swallowing and voice change.    Respiratory:  Negative for cough, shortness of breath and wheezing.    Cardiovascular:  Negative for chest pain, palpitations and leg swelling.   Gastrointestinal:  Negative for abdominal pain, blood in stool, constipation, diarrhea, nausea and vomiting.   Genitourinary:  Negative for dysuria.   Musculoskeletal:  Negative for arthralgias and myalgias.   Integumentary:  Negative for rash and wound.   Allergic/Immunologic: Negative for environmental allergies and food allergies.   Hematological:  Negative for adenopathy. Does not bruise/bleed easily.          Objective     Physical  Exam  Constitutional:       General: She is not in acute distress.     Appearance: Normal appearance. She is well-developed. She is not diaphoretic.   HENT:      Head: Normocephalic and atraumatic.      Right Ear: External ear normal.      Left Ear: External ear normal.      Nose: Nose normal.      Mouth/Throat:      Pharynx: No oropharyngeal exudate.   Eyes:      General: No scleral icterus.        Right eye: No discharge.         Left eye: No discharge.      Conjunctiva/sclera: Conjunctivae normal.      Pupils: Pupils are equal, round, and reactive to light.   Neck:      Vascular: No JVD.   Cardiovascular:      Rate and Rhythm: Normal rate and regular rhythm.      Pulses: Normal pulses.      Heart sounds: Normal heart sounds.   Pulmonary:      Effort: Pulmonary effort is normal. No respiratory distress.      Breath sounds: Normal breath sounds. No wheezing, rhonchi or rales.   Abdominal:      General: Bowel sounds are normal. There is no distension.      Palpations: Abdomen is soft.      Tenderness: There is no abdominal tenderness. There is no guarding or rebound.   Musculoskeletal:      Cervical back: Neck supple.      Right lower leg: No edema.      Left lower leg: No edema.        Feet:    Lymphadenopathy:      Cervical: No cervical adenopathy.   Skin:     General: Skin is warm and dry.      Coloration: Skin is not pale.      Findings: No rash.   Neurological:      General: No focal deficit present.      Mental Status: She is alert and oriented to person, place, and time.      Gait: Gait normal.   Psychiatric:         Behavior: Behavior normal.         Thought Content: Thought content normal.            Assessment and Plan     1. Right foot pain  -     Ambulatory referral/consult to Orthopedics; Future; Expected date: 07/30/2025    2. Dyslipidemia  -     Lipid panel; Future; Expected date: 01/23/2026    3. Fatigue, unspecified type  -     CBC auto differential; Future; Expected date: 01/23/2026  -      Comprehensive metabolic panel; Future; Expected date: 01/23/2026  -     TSH; Future; Expected date: 01/23/2026  -     Urinalysis; Future; Expected date: 01/23/2026  -     C-Reactive Protein; Future; Expected date: 01/23/2026